# Patient Record
Sex: MALE | Race: WHITE | Employment: OTHER | URBAN - NONMETROPOLITAN AREA
[De-identification: names, ages, dates, MRNs, and addresses within clinical notes are randomized per-mention and may not be internally consistent; named-entity substitution may affect disease eponyms.]

---

## 2017-01-05 ENCOUNTER — MED REC SCAN ONLY (OUTPATIENT)
Dept: FAMILY MEDICINE CLINIC | Facility: CLINIC | Age: 82
End: 2017-01-05

## 2017-01-09 ENCOUNTER — PATIENT OUTREACH (OUTPATIENT)
Dept: FAMILY MEDICINE CLINIC | Facility: CLINIC | Age: 82
End: 2017-01-09

## 2017-01-10 ENCOUNTER — ANESTHESIA EVENT (OUTPATIENT)
Dept: ENDOSCOPY | Facility: HOSPITAL | Age: 82
End: 2017-01-10
Payer: MEDICARE

## 2017-01-10 ENCOUNTER — SURGERY (OUTPATIENT)
Age: 82
End: 2017-01-10

## 2017-01-10 ENCOUNTER — ANESTHESIA (OUTPATIENT)
Dept: ENDOSCOPY | Facility: HOSPITAL | Age: 82
End: 2017-01-10
Payer: MEDICARE

## 2017-01-10 NOTE — ANESTHESIA PREPROCEDURE EVALUATION
PRE-OP EVALUATION    Patient Name: Smooth Locke    Pre-op Diagnosis: ANEMIA    Procedure(s):  ESOPHAGOGASTRODUODENOSCOPY AND COLONOSCOPY      Surgeon(s) and Role:     Adarsh Pulido MD - Primary    Pre-op vitals reviewed.           Current medica Cardiovascular    Negative cardiovascular ROS.     Exercise tolerance: good     MET: >4      (+) hypertension   (+) hyperlipidemia  (+) CAD    (+) CABG/stent                            Endo/Other           (+) hypothyroidism management plan discussed with surgeon and patient. Plan/risks discussed with: patient          Options, risks and benefits of anesthesia as outlined in the anesthesia consent were reviewed with the patient.  The consent was signed without further ques

## 2017-01-10 NOTE — ANESTHESIA POSTPROCEDURE EVALUATION
[id number] [facility] Patient Status:  Hospital Outpatient Surgery   Age/Gender 80year old male MRN [id number]   [facility] Attending Mariana Heredia MD   Hosp Day # 0 PCP Raphael Horton.  Gordon Jose DO       Anesthesia Po

## 2017-02-06 ENCOUNTER — OFFICE VISIT (OUTPATIENT)
Dept: FAMILY MEDICINE CLINIC | Facility: CLINIC | Age: 82
End: 2017-02-06

## 2017-02-06 ENCOUNTER — HOSPITAL ENCOUNTER (OUTPATIENT)
Dept: GENERAL RADIOLOGY | Age: 82
Discharge: HOME | End: 2017-02-06
Attending: FAMILY MEDICINE
Payer: MEDICARE

## 2017-02-06 VITALS
RESPIRATION RATE: 14 BRPM | DIASTOLIC BLOOD PRESSURE: 60 MMHG | HEART RATE: 78 BPM | TEMPERATURE: 98 F | OXYGEN SATURATION: 94 % | BODY MASS INDEX: 27 KG/M2 | WEIGHT: 196 LBS | SYSTOLIC BLOOD PRESSURE: 160 MMHG

## 2017-02-06 DIAGNOSIS — R05.9 COUGH: ICD-10-CM

## 2017-02-06 DIAGNOSIS — R06.02 SHORTNESS OF BREATH: ICD-10-CM

## 2017-02-06 DIAGNOSIS — I50.31 ACUTE DIASTOLIC CONGESTIVE HEART FAILURE (HCC): ICD-10-CM

## 2017-02-06 DIAGNOSIS — J44.1 CHRONIC OBSTRUCTIVE PULMONARY DISEASE WITH ACUTE EXACERBATION (HCC): Primary | ICD-10-CM

## 2017-02-06 PROCEDURE — 99214 OFFICE O/P EST MOD 30 MIN: CPT | Performed by: FAMILY MEDICINE

## 2017-02-06 PROCEDURE — 71020 XR CHEST PA + LAT CHEST (CPT=71020): CPT

## 2017-02-06 RX ORDER — HYDROCHLOROTHIAZIDE 25 MG/1
25 TABLET ORAL DAILY
COMMUNITY
End: 2019-12-18

## 2017-02-06 RX ORDER — LEVOFLOXACIN 500 MG/1
500 TABLET, FILM COATED ORAL DAILY
Qty: 10 TABLET | Refills: 0 | Status: SHIPPED | OUTPATIENT
Start: 2017-02-06 | End: 2017-02-16

## 2017-02-06 RX ORDER — PREDNISONE 20 MG/1
20 TABLET ORAL 2 TIMES DAILY
Qty: 10 TABLET | Refills: 0 | Status: SHIPPED | OUTPATIENT
Start: 2017-02-06 | End: 2017-02-11

## 2017-02-06 NOTE — PROGRESS NOTES
HPI:    Patient ID: Elder Dunne is a 80year old male. Patient presents with:  Cough: CONGESTION, SOB, SINUS DRAIANGE, WHEEZING--X 6 DAYS  here with his wife  HPI c/o cough with tan sputum, runny nose, no fever, SOB intermittently, onset   7-10 days a times daily. Disp:  Rfl:    brimonidine Tartrate (ALPHAGAN) 0.2 % Ophthalmic Solution Place 1 drop into both eyes 3 (three) times daily. Disp:  Rfl:    carvedilol (COREG) 25 MG Oral Tab Take 25 mg by mouth 2 (two) times daily with meals.  Disp:  Rfl: dry.       PROCEDURE:  XR CHEST PA + LAT CHEST (IFH=95316)     INDICATIONS:  R06.02 Shortness of breath R05 Cough     COMPARISON:  [place], XR CHEST PA + LAT CHEST (CPT=71020), 1/12/2016, 14:44.     TECHNIQUE:  PA and lateral chest radiographs were obtain

## 2017-02-06 NOTE — PATIENT INSTRUCTIONS
I have asked the patient to watch his sodium intake and monitor his weight daily  Increase hydrochlorothiazide 25 mg twice daily for the next 5 days  Prednisone 20 mg twice daily ×5 days  Levaquin 500 mg daily ×10 days  May use rescue inhaler up to every 4

## 2017-02-10 ENCOUNTER — TELEPHONE (OUTPATIENT)
Dept: FAMILY MEDICINE CLINIC | Facility: CLINIC | Age: 82
End: 2017-02-10

## 2017-02-10 DIAGNOSIS — T50.2X5A DIURETICS CAUSING ADVERSE EFFECT IN THERAPEUTIC USE, INITIAL ENCOUNTER: ICD-10-CM

## 2017-02-10 DIAGNOSIS — Z79.899 ENCOUNTER FOR LONG-TERM (CURRENT) DRUG USE: Primary | ICD-10-CM

## 2017-02-10 DIAGNOSIS — J44.1 CHRONIC OBSTRUCTIVE PULMONARY DISEASE WITH ACUTE EXACERBATION (HCC): ICD-10-CM

## 2017-02-10 DIAGNOSIS — I50.31 ACUTE DIASTOLIC (CONGESTIVE) HEART FAILURE (HCC): ICD-10-CM

## 2017-02-10 NOTE — TELEPHONE ENCOUNTER
Advised patient it sounds like were on the right direction, I would recommend continued twice daily dosing of hydrochlorothiazide and daily weight monitoring. I would shoot for a weight of around 192 before we go back to once daily dosing.   I would asked

## 2017-02-10 NOTE — TELEPHONE ENCOUNTER
Pt was seen on 2/6 for COPD, cough, SOB and acute diastolic CHF. Pt calls today with an update. States he is feeling ~70% better. Reports his wheezing and SOB have improved significantly, but he does still cough on and off. Gets some phlegm up at times.

## 2017-02-14 ENCOUNTER — APPOINTMENT (OUTPATIENT)
Dept: LAB | Age: 82
End: 2017-02-14
Attending: FAMILY MEDICINE
Payer: MEDICARE

## 2017-02-14 DIAGNOSIS — Z79.899 ENCOUNTER FOR LONG-TERM (CURRENT) DRUG USE: ICD-10-CM

## 2017-02-14 DIAGNOSIS — J44.1 CHRONIC OBSTRUCTIVE PULMONARY DISEASE WITH ACUTE EXACERBATION (HCC): ICD-10-CM

## 2017-02-14 DIAGNOSIS — I50.31 ACUTE DIASTOLIC (CONGESTIVE) HEART FAILURE (HCC): ICD-10-CM

## 2017-02-14 DIAGNOSIS — T50.2X5A DIURETICS CAUSING ADVERSE EFFECT IN THERAPEUTIC USE, INITIAL ENCOUNTER: ICD-10-CM

## 2017-02-14 LAB — POTASSIUM SERPL-SCNC: 4.7 MMOL/L (ref 3.6–5.1)

## 2017-02-14 PROCEDURE — 84132 ASSAY OF SERUM POTASSIUM: CPT

## 2017-02-14 PROCEDURE — 36415 COLL VENOUS BLD VENIPUNCTURE: CPT

## 2017-02-15 ENCOUNTER — TELEPHONE (OUTPATIENT)
Dept: FAMILY MEDICINE CLINIC | Facility: CLINIC | Age: 82
End: 2017-02-15

## 2017-02-15 DIAGNOSIS — J44.1 CHRONIC OBSTRUCTIVE PULMONARY DISEASE WITH ACUTE EXACERBATION (HCC): Primary | ICD-10-CM

## 2017-02-15 DIAGNOSIS — Z79.899 ENCOUNTER FOR LONG-TERM (CURRENT) DRUG USE: ICD-10-CM

## 2017-02-15 NOTE — TELEPHONE ENCOUNTER
----- Message from Nena Buenrostro.  Kym Dukes DO sent at 2/15/2017  7:53 AM CST -----  Advised patient his potassium level is normal, continue same diuretic therapy, recheck lites, BUN, and creatinine in 1 month

## 2017-02-23 ENCOUNTER — APPOINTMENT (OUTPATIENT)
Dept: LAB | Age: 82
End: 2017-02-23
Attending: FAMILY MEDICINE
Payer: MEDICARE

## 2017-02-23 DIAGNOSIS — R93.89 ABNORMAL ULTRASOUND: ICD-10-CM

## 2017-02-23 LAB
ALBUMIN SERPL-MCNC: 3.2 G/DL (ref 3.5–4.8)
ALP LIVER SERPL-CCNC: 112 U/L (ref 45–117)
ALT SERPL-CCNC: 30 U/L (ref 17–63)
AST SERPL-CCNC: 17 U/L (ref 15–41)
BILIRUB SERPL-MCNC: 0.6 MG/DL (ref 0.1–2)
BUN BLD-MCNC: 14 MG/DL (ref 8–20)
CALCIUM BLD-MCNC: 8.4 MG/DL (ref 8.3–10.3)
CHLORIDE: 109 MMOL/L (ref 101–111)
CO2: 28 MMOL/L (ref 22–32)
CREAT BLD-MCNC: 1.03 MG/DL (ref 0.7–1.3)
GLUCOSE BLD-MCNC: 119 MG/DL (ref 70–99)
M PROTEIN MFR SERPL ELPH: 6.2 G/DL (ref 6.1–8.3)
POTASSIUM SERPL-SCNC: 4.8 MMOL/L (ref 3.6–5.1)
SODIUM SERPL-SCNC: 142 MMOL/L (ref 136–144)

## 2017-02-23 PROCEDURE — 80053 COMPREHEN METABOLIC PANEL: CPT

## 2017-02-23 PROCEDURE — 36415 COLL VENOUS BLD VENIPUNCTURE: CPT

## 2017-03-14 ENCOUNTER — MED REC SCAN ONLY (OUTPATIENT)
Dept: FAMILY MEDICINE CLINIC | Facility: CLINIC | Age: 82
End: 2017-03-14

## 2017-04-04 ENCOUNTER — CHARTING TRANS (OUTPATIENT)
Dept: OTHER | Age: 82
End: 2017-04-04

## 2017-05-10 ENCOUNTER — TELEPHONE (OUTPATIENT)
Dept: FAMILY MEDICINE CLINIC | Facility: CLINIC | Age: 82
End: 2017-05-10

## 2017-05-10 ENCOUNTER — OFFICE VISIT (OUTPATIENT)
Dept: FAMILY MEDICINE CLINIC | Facility: CLINIC | Age: 82
End: 2017-05-10

## 2017-05-10 VITALS
TEMPERATURE: 98 F | WEIGHT: 187 LBS | HEIGHT: 71 IN | SYSTOLIC BLOOD PRESSURE: 138 MMHG | BODY MASS INDEX: 26.18 KG/M2 | DIASTOLIC BLOOD PRESSURE: 62 MMHG | HEART RATE: 86 BPM | OXYGEN SATURATION: 99 %

## 2017-05-10 DIAGNOSIS — D64.9 ANEMIA, UNSPECIFIED TYPE: ICD-10-CM

## 2017-05-10 DIAGNOSIS — D50.9 IRON DEFICIENCY ANEMIA, UNSPECIFIED IRON DEFICIENCY ANEMIA TYPE: ICD-10-CM

## 2017-05-10 DIAGNOSIS — I25.10 CORONARY ARTERY DISEASE INVOLVING NATIVE CORONARY ARTERY OF NATIVE HEART WITHOUT ANGINA PECTORIS: ICD-10-CM

## 2017-05-10 DIAGNOSIS — E78.00 HYPERCHOLESTEREMIA: ICD-10-CM

## 2017-05-10 DIAGNOSIS — I10 ESSENTIAL HYPERTENSION: ICD-10-CM

## 2017-05-10 DIAGNOSIS — J44.9 CHRONIC OBSTRUCTIVE PULMONARY DISEASE, UNSPECIFIED COPD TYPE (HCC): ICD-10-CM

## 2017-05-10 DIAGNOSIS — I73.9 PVD (PERIPHERAL VASCULAR DISEASE) (HCC): Primary | ICD-10-CM

## 2017-05-10 DIAGNOSIS — E03.9 HYPOTHYROIDISM (ACQUIRED): ICD-10-CM

## 2017-05-10 DIAGNOSIS — Z00.00 ROUTINE ADULT HEALTH MAINTENANCE: ICD-10-CM

## 2017-05-10 DIAGNOSIS — E03.9 HYPOTHYROIDISM (ACQUIRED): Primary | ICD-10-CM

## 2017-05-10 PROCEDURE — 99214 OFFICE O/P EST MOD 30 MIN: CPT | Performed by: FAMILY MEDICINE

## 2017-05-10 NOTE — PROGRESS NOTES
HPI:    Patient ID: Joel Perla is a 80year old male. Patient presents with:  Medication Follow-Up  HTN: f/u  CAD: f/u  COPD: f/u  Hypothyroidism: f/u  Pre-diabetes: f/u    HPI pt reports labs from Dr Elijah Henao 3 weeks ago , no changes, denies shortness o Disp:  Rfl:    lisinopril (PRINIVIL,ZESTRIL) 20 MG Oral Tab Take 20 mg by mouth 2 (two) times daily. Disp:  Rfl:    finasteride (PROSCAR) 5 MG Oral Tab Take 5 mg by mouth daily.  Disp:  Rfl:    ASPIRIN 81 MG OR TABS 1 TABLET DAILY-pt to check with Dr. Reyes Desouza evaluation. Discussed management options for peripheral vascular disease including continued antiplatelet therapy and daily walking regimen  Reviewed the use of inhaled medications.   Medication list reviewed, no changes pending review of labs        No or

## 2017-05-10 NOTE — TELEPHONE ENCOUNTER
Please advise patient that I just received the labs from Dr. Demetrio Oneil. The only thing that was checked was his cholesterol  I like patient to follow-up for BMP, CBC, ferritin, TSH and free T4.

## 2017-05-10 NOTE — PATIENT INSTRUCTIONS
I reviewed goals for blood pressure, lipids, blood sugar. We will obtain records of most recent labs and cardiology evaluation.   Discussed management options for peripheral vascular disease including continued antiplatelet therapy and daily walking regime

## 2017-05-11 ENCOUNTER — MED REC SCAN ONLY (OUTPATIENT)
Dept: FAMILY MEDICINE CLINIC | Facility: CLINIC | Age: 82
End: 2017-05-11

## 2017-05-11 PROBLEM — Z00.00 ROUTINE ADULT HEALTH MAINTENANCE: Status: ACTIVE | Noted: 2017-05-11

## 2017-05-11 NOTE — TELEPHONE ENCOUNTER
Patient notified and verbalized understanding of the information provided  No additional questions at this time  Labs ordered  Future Appointments  Date Time Provider Wade Lawrence   5/12/2017 2:00 PM REF EMG SW FAM PRAC REF [facility] Ref Lab [facility]

## 2017-05-12 ENCOUNTER — LAB ENCOUNTER (OUTPATIENT)
Dept: LAB | Age: 82
End: 2017-05-12
Attending: FAMILY MEDICINE
Payer: MEDICARE

## 2017-05-12 DIAGNOSIS — D64.9 ANEMIA, UNSPECIFIED TYPE: ICD-10-CM

## 2017-05-12 DIAGNOSIS — E03.9 HYPOTHYROIDISM (ACQUIRED): ICD-10-CM

## 2017-05-12 DIAGNOSIS — I25.10 CORONARY ARTERY DISEASE INVOLVING NATIVE CORONARY ARTERY OF NATIVE HEART WITHOUT ANGINA PECTORIS: ICD-10-CM

## 2017-05-12 PROCEDURE — 82728 ASSAY OF FERRITIN: CPT

## 2017-05-12 PROCEDURE — 85025 COMPLETE CBC W/AUTO DIFF WBC: CPT

## 2017-05-12 PROCEDURE — 84439 ASSAY OF FREE THYROXINE: CPT

## 2017-05-12 PROCEDURE — 36415 COLL VENOUS BLD VENIPUNCTURE: CPT

## 2017-05-12 PROCEDURE — 84443 ASSAY THYROID STIM HORMONE: CPT

## 2017-05-12 PROCEDURE — 80048 BASIC METABOLIC PNL TOTAL CA: CPT

## 2017-05-15 ENCOUNTER — MED REC SCAN ONLY (OUTPATIENT)
Dept: FAMILY MEDICINE CLINIC | Facility: CLINIC | Age: 82
End: 2017-05-15

## 2017-06-02 ENCOUNTER — MED REC SCAN ONLY (OUTPATIENT)
Dept: FAMILY MEDICINE CLINIC | Facility: CLINIC | Age: 82
End: 2017-06-02

## 2017-08-15 ENCOUNTER — TELEPHONE (OUTPATIENT)
Dept: FAMILY MEDICINE CLINIC | Facility: CLINIC | Age: 82
End: 2017-08-15

## 2017-08-15 NOTE — TELEPHONE ENCOUNTER
Sheryl Sam, RN  [facility] Nurse             Patient is due for repeat CBC.  He would like a call to schedule

## 2017-08-17 NOTE — TELEPHONE ENCOUNTER
APPT SCHEDULED    Future Appointments  Date Time Provider Wade Melinda   8/18/2017 9:30 AM REF EM[facility] SW FAM PRAC REF [facility] Ref Lab [place]   8/23/2017 1:00 PM [facility]   8/23/2017 2:30 PM Rodney Funk MD [facility] 9644

## 2017-08-18 ENCOUNTER — LABORATORY ENCOUNTER (OUTPATIENT)
Dept: LAB | Age: 82
End: 2017-08-18
Attending: FAMILY MEDICINE
Payer: MEDICARE

## 2017-08-18 DIAGNOSIS — D50.8 OTHER IRON DEFICIENCY ANEMIA: ICD-10-CM

## 2017-08-18 LAB
BASOPHILS # BLD AUTO: 0.02 X10(3) UL (ref 0–0.1)
BASOPHILS NFR BLD AUTO: 0.2 %
EOSINOPHIL # BLD AUTO: 0 X10(3) UL (ref 0–0.3)
EOSINOPHIL NFR BLD AUTO: 0 %
ERYTHROCYTE [DISTWIDTH] IN BLOOD BY AUTOMATED COUNT: 14.3 % (ref 11.5–16)
HCT VFR BLD AUTO: 61.4 % (ref 37–53)
HGB BLD-MCNC: 20.3 G/DL (ref 13–17)
IMMATURE GRANULOCYTE COUNT: 0.05 X10(3) UL (ref 0–1)
IMMATURE GRANULOCYTE RATIO %: 0.6 %
LYMPHOCYTES # BLD AUTO: 0.82 X10(3) UL (ref 0.9–4)
LYMPHOCYTES NFR BLD AUTO: 10 %
MCH RBC QN AUTO: 32.4 PG (ref 27–33.2)
MCHC RBC AUTO-ENTMCNC: 33.1 G/DL (ref 31–37)
MCV RBC AUTO: 98.1 FL (ref 80–99)
MONOCYTES # BLD AUTO: 0.4 X10(3) UL (ref 0.1–0.6)
MONOCYTES NFR BLD AUTO: 4.9 %
NEUTROPHIL ABS PRELIM: 6.93 X10 (3) UL (ref 1.3–6.7)
NEUTROPHILS # BLD AUTO: 6.93 X10(3) UL (ref 1.3–6.7)
NEUTROPHILS NFR BLD AUTO: 84.3 %
PLATELET # BLD AUTO: 77 10(3)UL (ref 150–450)
RBC # BLD AUTO: 6.26 X10(6)UL (ref 3.8–5.8)
RED CELL DISTRIBUTION WIDTH-SD: 49.9 FL (ref 35.1–46.3)
WBC # BLD AUTO: 8.2 X10(3) UL (ref 4–13)

## 2017-08-18 PROCEDURE — 85025 COMPLETE CBC W/AUTO DIFF WBC: CPT

## 2017-08-18 PROCEDURE — 36415 COLL VENOUS BLD VENIPUNCTURE: CPT

## 2017-08-19 ENCOUNTER — TELEPHONE (OUTPATIENT)
Dept: FAMILY MEDICINE CLINIC | Facility: CLINIC | Age: 82
End: 2017-08-19

## 2017-08-19 NOTE — TELEPHONE ENCOUNTER
Patient calling back states he forgot to mention he is taking a medrol dose annette for lower back pain, and sciatic nerve pain. Wondering if this could have messed with blood tests.    Patient would like for Dr. Carson Ca to know this, to see if this changes

## 2017-08-25 ENCOUNTER — TELEPHONE (OUTPATIENT)
Dept: FAMILY MEDICINE CLINIC | Facility: CLINIC | Age: 82
End: 2017-08-25

## 2017-08-25 NOTE — TELEPHONE ENCOUNTER
Please advise patient that his hemoglobin is back into a more normal range. I think the previous result was a lab error.

## 2017-09-28 ENCOUNTER — CHARTING TRANS (OUTPATIENT)
Dept: OTHER | Age: 82
End: 2017-09-28

## 2017-11-13 ENCOUNTER — CHARTING TRANS (OUTPATIENT)
Dept: OTHER | Age: 82
End: 2017-11-13

## 2017-11-16 ENCOUNTER — CHARTING TRANS (OUTPATIENT)
Dept: OTHER | Age: 82
End: 2017-11-16

## 2017-11-27 ENCOUNTER — CHARTING TRANS (OUTPATIENT)
Dept: OTHER | Age: 82
End: 2017-11-27

## 2017-11-30 ENCOUNTER — CHARTING TRANS (OUTPATIENT)
Dept: OTHER | Age: 82
End: 2017-11-30

## 2017-12-12 ENCOUNTER — CHARTING TRANS (OUTPATIENT)
Dept: OTHER | Age: 82
End: 2017-12-12

## 2018-01-16 ENCOUNTER — OFFICE VISIT (OUTPATIENT)
Dept: FAMILY MEDICINE CLINIC | Facility: CLINIC | Age: 83
End: 2018-01-16

## 2018-01-16 ENCOUNTER — LABORATORY ENCOUNTER (OUTPATIENT)
Dept: LAB | Age: 83
End: 2018-01-16
Attending: FAMILY MEDICINE
Payer: MEDICARE

## 2018-01-16 VITALS
HEIGHT: 71.5 IN | HEART RATE: 56 BPM | WEIGHT: 195.5 LBS | DIASTOLIC BLOOD PRESSURE: 70 MMHG | OXYGEN SATURATION: 99 % | TEMPERATURE: 98 F | SYSTOLIC BLOOD PRESSURE: 126 MMHG | BODY MASS INDEX: 26.77 KG/M2

## 2018-01-16 DIAGNOSIS — D64.9 ANEMIA, UNSPECIFIED TYPE: ICD-10-CM

## 2018-01-16 DIAGNOSIS — E78.00 HYPERCHOLESTEREMIA: ICD-10-CM

## 2018-01-16 DIAGNOSIS — Z13.31 DEPRESSION SCREENING: ICD-10-CM

## 2018-01-16 DIAGNOSIS — I10 ESSENTIAL HYPERTENSION: ICD-10-CM

## 2018-01-16 DIAGNOSIS — I25.10 CORONARY ARTERY DISEASE INVOLVING NATIVE CORONARY ARTERY OF NATIVE HEART WITHOUT ANGINA PECTORIS: ICD-10-CM

## 2018-01-16 DIAGNOSIS — Z00.00 ENCOUNTER FOR ANNUAL HEALTH EXAMINATION: Primary | ICD-10-CM

## 2018-01-16 DIAGNOSIS — R73.03 PRE-DIABETES: ICD-10-CM

## 2018-01-16 DIAGNOSIS — J44.9 CHRONIC OBSTRUCTIVE PULMONARY DISEASE, UNSPECIFIED COPD TYPE (HCC): ICD-10-CM

## 2018-01-16 DIAGNOSIS — I73.9 PVD (PERIPHERAL VASCULAR DISEASE) (HCC): ICD-10-CM

## 2018-01-16 DIAGNOSIS — E03.9 HYPOTHYROIDISM (ACQUIRED): ICD-10-CM

## 2018-01-16 DIAGNOSIS — N40.0 BENIGN NON-NODULAR PROSTATIC HYPERPLASIA WITHOUT LOWER URINARY TRACT SYMPTOMS: ICD-10-CM

## 2018-01-16 DIAGNOSIS — D50.8 OTHER IRON DEFICIENCY ANEMIA: ICD-10-CM

## 2018-01-16 DIAGNOSIS — H40.9 GLAUCOMA OF BOTH EYES, UNSPECIFIED GLAUCOMA TYPE: ICD-10-CM

## 2018-01-16 LAB
ALBUMIN SERPL-MCNC: 3.7 G/DL (ref 3.5–4.8)
ALP LIVER SERPL-CCNC: 58 U/L (ref 45–117)
ALT SERPL-CCNC: 32 U/L (ref 17–63)
AST SERPL-CCNC: 23 U/L (ref 15–41)
BASOPHILS # BLD AUTO: 0.03 X10(3) UL (ref 0–0.1)
BASOPHILS NFR BLD AUTO: 0.5 %
BILIRUB SERPL-MCNC: 0.8 MG/DL (ref 0.1–2)
BUN BLD-MCNC: 26 MG/DL (ref 8–20)
CALCIUM BLD-MCNC: 8.5 MG/DL (ref 8.3–10.3)
CHLORIDE: 109 MMOL/L (ref 101–111)
CHOLEST SMN-MCNC: 141 MG/DL (ref ?–200)
CO2: 27 MMOL/L (ref 22–32)
CREAT BLD-MCNC: 0.99 MG/DL (ref 0.7–1.3)
DEPRECATED HBV CORE AB SER IA-ACNC: 361.9 NG/ML (ref 22–322)
EOSINOPHIL # BLD AUTO: 0.14 X10(3) UL (ref 0–0.3)
EOSINOPHIL NFR BLD AUTO: 2.4 %
ERYTHROCYTE [DISTWIDTH] IN BLOOD BY AUTOMATED COUNT: 12.2 % (ref 11.5–16)
EST. AVERAGE GLUCOSE BLD GHB EST-MCNC: 108 MG/DL (ref 68–126)
FREE T4: 1.5 NG/DL (ref 0.9–1.8)
GLUCOSE BLD-MCNC: 91 MG/DL (ref 70–99)
HBA1C MFR BLD HPLC: 5.4 % (ref ?–5.7)
HCT VFR BLD AUTO: 35.1 % (ref 37–53)
HDLC SERPL-MCNC: 55 MG/DL (ref 45–?)
HDLC SERPL: 2.56 {RATIO} (ref ?–4.97)
HEMOGLOBIN: 12.7 G/DL (ref 10.3–19.8)
HGB BLD-MCNC: 11.9 G/DL (ref 13–17)
IMMATURE GRANULOCYTE COUNT: 0.02 X10(3) UL (ref 0–1)
IMMATURE GRANULOCYTE RATIO %: 0.3 %
LDLC SERPL CALC-MCNC: 70 MG/DL (ref ?–130)
LYMPHOCYTES # BLD AUTO: 1.07 X10(3) UL (ref 0.9–4)
LYMPHOCYTES NFR BLD AUTO: 18.2 %
M PROTEIN MFR SERPL ELPH: 6.4 G/DL (ref 6.1–8.3)
MCH RBC QN AUTO: 32.8 PG (ref 27–33.2)
MCHC RBC AUTO-ENTMCNC: 33.9 G/DL (ref 31–37)
MCV RBC AUTO: 96.7 FL (ref 80–99)
MONOCYTES # BLD AUTO: 0.51 X10(3) UL (ref 0.1–0.6)
MONOCYTES NFR BLD AUTO: 8.7 %
NEUTROPHIL ABS PRELIM: 4.11 X10 (3) UL (ref 1.3–6.7)
NEUTROPHILS # BLD AUTO: 4.11 X10(3) UL (ref 1.3–6.7)
NEUTROPHILS NFR BLD AUTO: 69.9 %
NONHDLC SERPL-MCNC: 86 MG/DL (ref ?–130)
PLATELET # BLD AUTO: 168 10(3)UL (ref 150–450)
PLT IC: 197 10^3/UL (ref 150–450)
POTASSIUM SERPL-SCNC: 4.4 MMOL/L (ref 3.6–5.1)
RBC # BLD AUTO: 3.63 X10(6)UL (ref 3.8–5.8)
RED CELL DISTRIBUTION WIDTH-SD: 43.3 FL (ref 35.1–46.3)
SODIUM SERPL-SCNC: 142 MMOL/L (ref 136–144)
TRIGL SERPL-MCNC: 78 MG/DL (ref ?–150)
TSI SER-ACNC: 0.43 MIU/ML (ref 0.35–5.5)
VLDLC SERPL CALC-MCNC: 16 MG/DL (ref 5–40)
WBC # BLD AUTO: 5.9 X10(3) UL (ref 4–13)

## 2018-01-16 PROCEDURE — 84443 ASSAY THYROID STIM HORMONE: CPT

## 2018-01-16 PROCEDURE — 84439 ASSAY OF FREE THYROXINE: CPT

## 2018-01-16 PROCEDURE — 83036 HEMOGLOBIN GLYCOSYLATED A1C: CPT

## 2018-01-16 PROCEDURE — 36415 COLL VENOUS BLD VENIPUNCTURE: CPT

## 2018-01-16 PROCEDURE — G0444 DEPRESSION SCREEN ANNUAL: HCPCS | Performed by: FAMILY MEDICINE

## 2018-01-16 PROCEDURE — G0439 PPPS, SUBSEQ VISIT: HCPCS | Performed by: FAMILY MEDICINE

## 2018-01-16 PROCEDURE — 80061 LIPID PANEL: CPT

## 2018-01-16 PROCEDURE — 36415 COLL VENOUS BLD VENIPUNCTURE: CPT | Performed by: FAMILY MEDICINE

## 2018-01-16 PROCEDURE — 85025 COMPLETE CBC W/AUTO DIFF WBC: CPT

## 2018-01-16 PROCEDURE — 80053 COMPREHEN METABOLIC PANEL: CPT

## 2018-01-16 PROCEDURE — 82728 ASSAY OF FERRITIN: CPT

## 2018-01-16 RX ORDER — VITAMIN E 268 MG
1 CAPSULE ORAL DAILY
COMMUNITY

## 2018-01-16 NOTE — PATIENT INSTRUCTIONS
Harrison Gonzalez's SCREENING SCHEDULE   Tests on this list are recommended by your physician but may not be covered, or covered at this frequency, by your insurer. Please check with your insurance carrier before scheduling to verify coverage.     PREVENT in their lifetime   • Anyone with a family history    Colorectal Cancer Screening Covered up to Age 76     Colonoscopy Screen   Covered every 10 years- more often if abnormal There are no preventive care reminders to display for this patient.  Update Health Tetanus Toxoid- Only covered with a cut with metal- TD and TDaP Not covered by Medicare Part B) No orders found for this or any previous visit.  This may be covered with your prescription benefits, but Medicare does not cover unless Medically needed    Zost weight reduction. - VENIPUNCTURE  - COMP METABOLIC PANEL (14); Future  - HEMOGLOBIN A1C; Future    6. Hypothyroidism (acquired)  Continue daily thyroid replacement pending results of today's labs  - VENIPUNCTURE  - TSH+FREE T4; Future    7.  Anemia, unspec

## 2018-01-16 NOTE — H&P
Nancy Lama is a 80year old male Patient presents with:  Physical: Medicare AWV    HPI:   Pt voices no concerns   Wt Readings from Last 6 Encounters:  01/16/18 : 195 lb 8 oz  08/23/17 : 191 lb  05/10/17 : 187 lb  02/06/17 : 196 lb  01/10/17 : 193 lb Place 1 drop into both eyes 3 (three) times daily. Disp:  Rfl:    carvedilol (COREG) 25 MG Oral Tab Take 25 mg by mouth 2 (two) times daily with meals.  Disp:  Rfl:    Dorzolamide HCl (TRUSOPT) 2 % Ophthalmic Solution Place 1 drop into both eyes 3 (three) Comment: right shoulder surgery   Family History   Problem Relation Age of Onset   • Heart Disorder Brother       Specialists:Dr Watters- GI, Dr Zuñiga-cardiology, Dr Pete Cooks surgery, Dr Enos Meigs- urology, Dr Patricio Jenkins- dermatology ( [facility])  Social History sleep difficulty,                   Have you often been bothered by feeling down, depressed of hopeless?  no                  Have you often been bothered by little interest or pleasure in doing things? no  HEMATOLOGIC: denies unexplained bruising or bleedi Encounter for annual health examination  (primary encounter diagnosis)  Depression screening  Coronary artery disease involving native coronary artery of native heart without angina pectoris  Hypercholesteremia  Pre-diabetes  Hypothyroidism (acquired)  A every 3 years,           Glucose (mg/dL)   Date Value   05/12/2017 136 (H)   07/03/2014 108 (H)   ---------- Medicare covers annually or at 6-month intervals for prediabetic patients        Cardiovascular Disease Screening     Cholesterol, covered every 5 annually to 75 There are no preventive care reminders to display for this patient. No results found for this or any previous visit.    Annually (age 48 or over), QASIM not paid separately when covered E/M service is provided on same date    Immunizations printer. (the forms are also available in [facility])  [email or web address]  This link also has information from the [facility] regarding Advance Directives.   1. Encounter for annual health examination  I reviewed age-appropriate preventi visit  - VENIPUNCTURE    10. Chronic obstructive pulmonary disease, unspecified COPD type ([facility]  Continue inhaled bronchodilators as needed  - VENIPUNCTURE    11.  Benign non-nodular prostatic hyperplasia without lower urinary tract symptoms  Follow-up with

## 2018-01-17 DIAGNOSIS — R73.03 PRE-DIABETES: ICD-10-CM

## 2018-01-17 DIAGNOSIS — E78.00 ELEVATED CHOLESTEROL: ICD-10-CM

## 2018-01-17 DIAGNOSIS — D64.9 ANEMIA, UNSPECIFIED TYPE: ICD-10-CM

## 2018-01-17 DIAGNOSIS — Z79.899 ENCOUNTER FOR LONG-TERM (CURRENT) DRUG USE: ICD-10-CM

## 2018-01-17 DIAGNOSIS — E03.9 HYPOTHYROIDISM (ACQUIRED): Primary | ICD-10-CM

## 2018-01-22 ENCOUNTER — APPOINTMENT (OUTPATIENT)
Dept: LAB | Facility: HOSPITAL | Age: 83
End: 2018-01-22
Attending: FAMILY MEDICINE
Payer: MEDICARE

## 2018-01-22 DIAGNOSIS — D64.9 ANEMIA, UNSPECIFIED TYPE: ICD-10-CM

## 2018-01-22 PROCEDURE — 82272 OCCULT BLD FECES 1-3 TESTS: CPT

## 2018-05-30 ENCOUNTER — CHARTING TRANS (OUTPATIENT)
Dept: OTHER | Age: 83
End: 2018-05-30

## 2018-06-13 ENCOUNTER — CHARTING TRANS (OUTPATIENT)
Dept: OTHER | Age: 83
End: 2018-06-13

## 2018-07-11 ENCOUNTER — MED REC SCAN ONLY (OUTPATIENT)
Dept: FAMILY MEDICINE CLINIC | Facility: CLINIC | Age: 83
End: 2018-07-11

## 2018-07-23 ENCOUNTER — OFFICE VISIT (OUTPATIENT)
Dept: FAMILY MEDICINE CLINIC | Facility: CLINIC | Age: 83
End: 2018-07-23
Payer: MEDICARE

## 2018-07-23 ENCOUNTER — LABORATORY ENCOUNTER (OUTPATIENT)
Dept: LAB | Age: 83
End: 2018-07-23
Attending: FAMILY MEDICINE
Payer: MEDICARE

## 2018-07-23 VITALS
SYSTOLIC BLOOD PRESSURE: 138 MMHG | WEIGHT: 196 LBS | TEMPERATURE: 98 F | OXYGEN SATURATION: 99 % | HEART RATE: 52 BPM | DIASTOLIC BLOOD PRESSURE: 70 MMHG | BODY MASS INDEX: 27 KG/M2

## 2018-07-23 DIAGNOSIS — E03.9 HYPOTHYROIDISM (ACQUIRED): ICD-10-CM

## 2018-07-23 DIAGNOSIS — E78.00 HYPERCHOLESTEREMIA: ICD-10-CM

## 2018-07-23 DIAGNOSIS — I10 ESSENTIAL HYPERTENSION: ICD-10-CM

## 2018-07-23 DIAGNOSIS — D50.9 IRON DEFICIENCY ANEMIA, UNSPECIFIED IRON DEFICIENCY ANEMIA TYPE: ICD-10-CM

## 2018-07-23 DIAGNOSIS — J44.9 CHRONIC OBSTRUCTIVE PULMONARY DISEASE, UNSPECIFIED COPD TYPE (HCC): ICD-10-CM

## 2018-07-23 DIAGNOSIS — E78.00 ELEVATED CHOLESTEROL: ICD-10-CM

## 2018-07-23 DIAGNOSIS — I73.9 PVD (PERIPHERAL VASCULAR DISEASE) (HCC): ICD-10-CM

## 2018-07-23 DIAGNOSIS — R10.11 RIGHT UPPER QUADRANT ABDOMINAL PAIN: Primary | ICD-10-CM

## 2018-07-23 DIAGNOSIS — I25.10 CORONARY ARTERY DISEASE INVOLVING NATIVE CORONARY ARTERY OF NATIVE HEART WITHOUT ANGINA PECTORIS: ICD-10-CM

## 2018-07-23 LAB
ALBUMIN SERPL-MCNC: 3.8 G/DL (ref 3.5–4.8)
ALBUMIN/GLOB SERPL: 1.3 {RATIO} (ref 1–2)
ALP LIVER SERPL-CCNC: 70 U/L (ref 45–117)
ALT SERPL-CCNC: 29 U/L (ref 17–63)
ANION GAP SERPL CALC-SCNC: 4 MMOL/L (ref 0–18)
AST SERPL-CCNC: 21 U/L (ref 15–41)
BASOPHILS # BLD AUTO: 0.03 X10(3) UL (ref 0–0.1)
BASOPHILS NFR BLD AUTO: 0.5 %
BILIRUB SERPL-MCNC: 0.6 MG/DL (ref 0.1–2)
BUN BLD-MCNC: 18 MG/DL (ref 8–20)
BUN/CREAT SERPL: 17.5 (ref 10–20)
CALCIUM BLD-MCNC: 8.4 MG/DL (ref 8.3–10.3)
CHLORIDE SERPL-SCNC: 113 MMOL/L (ref 101–111)
CHOLEST SMN-MCNC: 131 MG/DL (ref ?–200)
CO2 SERPL-SCNC: 26 MMOL/L (ref 22–32)
CREAT BLD-MCNC: 1.03 MG/DL (ref 0.7–1.3)
EOSINOPHIL # BLD AUTO: 0.41 X10(3) UL (ref 0–0.3)
EOSINOPHIL NFR BLD AUTO: 6.5 %
ERYTHROCYTE [DISTWIDTH] IN BLOOD BY AUTOMATED COUNT: 13 % (ref 11.5–16)
GLOBULIN PLAS-MCNC: 2.9 G/DL (ref 2.5–3.7)
GLUCOSE BLD-MCNC: 110 MG/DL (ref 70–99)
HCT VFR BLD AUTO: 35.5 % (ref 37–53)
HDLC SERPL-MCNC: 45 MG/DL (ref 40–59)
HGB BLD-MCNC: 12 G/DL (ref 13–17)
IMMATURE GRANULOCYTE COUNT: 0.03 X10(3) UL (ref 0–1)
IMMATURE GRANULOCYTE RATIO %: 0.5 %
LDLC SERPL CALC-MCNC: 65 MG/DL (ref ?–100)
LYMPHOCYTES # BLD AUTO: 1.13 X10(3) UL (ref 0.9–4)
LYMPHOCYTES NFR BLD AUTO: 17.8 %
M PROTEIN MFR SERPL ELPH: 6.7 G/DL (ref 6.1–8.3)
MCH RBC QN AUTO: 32.6 PG (ref 27–33.2)
MCHC RBC AUTO-ENTMCNC: 33.8 G/DL (ref 31–37)
MCV RBC AUTO: 96.5 FL (ref 80–99)
MONOCYTES # BLD AUTO: 0.59 X10(3) UL (ref 0.1–1)
MONOCYTES NFR BLD AUTO: 9.3 %
NEUTROPHIL ABS PRELIM: 4.15 X10 (3) UL (ref 1.3–6.7)
NEUTROPHILS # BLD AUTO: 4.15 X10(3) UL (ref 1.3–6.7)
NEUTROPHILS NFR BLD AUTO: 65.4 %
NONHDLC SERPL-MCNC: 86 MG/DL (ref ?–130)
OSMOLALITY SERPL CALC.SUM OF ELEC: 299 MOSM/KG (ref 275–295)
PLATELET # BLD AUTO: 170 10(3)UL (ref 150–450)
POTASSIUM SERPL-SCNC: 5.2 MMOL/L (ref 3.6–5.1)
RBC # BLD AUTO: 3.68 X10(6)UL (ref 3.8–5.8)
RED CELL DISTRIBUTION WIDTH-SD: 45.1 FL (ref 35.1–46.3)
SODIUM SERPL-SCNC: 143 MMOL/L (ref 136–144)
T4 FREE SERPL-MCNC: 1.3 NG/DL (ref 0.9–1.8)
TRIGL SERPL-MCNC: 106 MG/DL (ref 30–149)
TSI SER-ACNC: 0.31 MIU/ML (ref 0.35–5.5)
VLDLC SERPL CALC-MCNC: 21 MG/DL (ref 0–30)
WBC # BLD AUTO: 6.3 X10(3) UL (ref 4–13)

## 2018-07-23 PROCEDURE — 84439 ASSAY OF FREE THYROXINE: CPT

## 2018-07-23 PROCEDURE — 36415 COLL VENOUS BLD VENIPUNCTURE: CPT

## 2018-07-23 PROCEDURE — 36415 COLL VENOUS BLD VENIPUNCTURE: CPT | Performed by: FAMILY MEDICINE

## 2018-07-23 PROCEDURE — 99214 OFFICE O/P EST MOD 30 MIN: CPT | Performed by: FAMILY MEDICINE

## 2018-07-23 PROCEDURE — 80053 COMPREHEN METABOLIC PANEL: CPT

## 2018-07-23 PROCEDURE — 80061 LIPID PANEL: CPT

## 2018-07-23 PROCEDURE — 84443 ASSAY THYROID STIM HORMONE: CPT

## 2018-07-23 PROCEDURE — 85025 COMPLETE CBC W/AUTO DIFF WBC: CPT

## 2018-07-23 RX ORDER — OMEPRAZOLE 20 MG/1
20 CAPSULE, DELAYED RELEASE ORAL EVERY MORNING
Qty: 30 CAPSULE | Refills: 0 | Status: SHIPPED | OUTPATIENT
Start: 2018-07-23 | End: 2018-10-19

## 2018-07-23 NOTE — PROGRESS NOTES
HPI:    Patient ID: Lluvia Haas is a 80year old male.   Patient presents with:  HTN: f/u  Lipids: f/u  Hypothyroidism: f/u  CAD: f/u  COPD: f/u  pt saw Dr Dennis Gage 7/9/18, Eliquis stopped , s/p ablation for a-flutter  HPI c/o RUQ pain off and on x 1 m 125 mcg by mouth before breakfast. Disp:  Rfl:    Cranberry 125 MG Oral Tab Take 1 tablet by mouth daily. Disp:  Rfl:    Ipratropium-Albuterol  MCG/ACT Inhalation Aero Soln Inhale into the lungs 4 (four) times daily.    Disp:  Rfl:    brimonidine Tart person, place, and time. Skin: Skin is warm and dry. Psychiatric: His behavior is normal. Judgment and thought content normal. His affect is blunt. His speech is delayed.     Blood pressure 138/70, pulse 52, temperature 98.2 °F (36.8 °C), temperature so

## 2018-07-23 NOTE — PATIENT INSTRUCTIONS
I reviewed possible sources for patient's abdominal pain. Based on response to antacids and location, would recommend treatment for gastritis. Patient advised to avoid NSAIDs such as ibuprofen or Aleve, may use Tylenol.   Would avoid taking medication wit

## 2018-08-10 ENCOUNTER — APPOINTMENT (OUTPATIENT)
Dept: LAB | Age: 83
End: 2018-08-10
Attending: FAMILY MEDICINE
Payer: MEDICARE

## 2018-08-10 DIAGNOSIS — E87.5 HYPERKALEMIA: ICD-10-CM

## 2018-08-10 LAB — POTASSIUM SERPL-SCNC: 4.5 MMOL/L (ref 3.6–5.1)

## 2018-08-10 PROCEDURE — 36415 COLL VENOUS BLD VENIPUNCTURE: CPT

## 2018-08-10 PROCEDURE — 84132 ASSAY OF SERUM POTASSIUM: CPT

## 2018-08-11 DIAGNOSIS — E87.5 SERUM POTASSIUM ELEVATED: Primary | ICD-10-CM

## 2018-10-19 RX ORDER — LORATADINE 10 MG/1
10 TABLET ORAL DAILY
COMMUNITY

## 2018-10-19 RX ORDER — MELATONIN
325
COMMUNITY
End: 2019-01-21

## 2018-10-23 ENCOUNTER — HOSPITAL ENCOUNTER (OUTPATIENT)
Dept: INTERVENTIONAL RADIOLOGY/VASCULAR | Facility: HOSPITAL | Age: 83
Discharge: HOME | End: 2018-10-23
Attending: SURGERY | Admitting: SURGERY
Payer: MEDICARE

## 2018-10-23 VITALS
RESPIRATION RATE: 21 BRPM | DIASTOLIC BLOOD PRESSURE: 59 MMHG | SYSTOLIC BLOOD PRESSURE: 144 MMHG | HEIGHT: 71 IN | OXYGEN SATURATION: 100 % | HEART RATE: 64 BPM | WEIGHT: 190 LBS | BODY MASS INDEX: 26.6 KG/M2 | TEMPERATURE: 97 F

## 2018-10-23 DIAGNOSIS — I73.9 PVD (PERIPHERAL VASCULAR DISEASE) (HCC): ICD-10-CM

## 2018-10-23 PROCEDURE — 36246 INS CATH ABD/L-EXT ART 2ND: CPT

## 2018-10-23 PROCEDURE — 99152 MOD SED SAME PHYS/QHP 5/>YRS: CPT

## 2018-10-23 PROCEDURE — B410YZZ FLUOROSCOPY OF ABDOMINAL AORTA USING OTHER CONTRAST: ICD-10-PCS | Performed by: SURGERY

## 2018-10-23 PROCEDURE — 76937 US GUIDE VASCULAR ACCESS: CPT

## 2018-10-23 PROCEDURE — 75630 X-RAY AORTA LEG ARTERIES: CPT

## 2018-10-23 PROCEDURE — B41GYZZ FLUOROSCOPY OF LEFT LOWER EXTREMITY ARTERIES USING OTHER CONTRAST: ICD-10-PCS | Performed by: SURGERY

## 2018-10-23 RX ORDER — MIDAZOLAM HYDROCHLORIDE 1 MG/ML
INJECTION INTRAMUSCULAR; INTRAVENOUS
Status: COMPLETED
Start: 2018-10-23 | End: 2018-10-23

## 2018-10-23 RX ORDER — HYDRALAZINE HYDROCHLORIDE 20 MG/ML
INJECTION INTRAMUSCULAR; INTRAVENOUS
Status: COMPLETED
Start: 2018-10-23 | End: 2018-10-23

## 2018-10-23 RX ORDER — HEPARIN SODIUM 5000 [USP'U]/ML
INJECTION, SOLUTION INTRAVENOUS; SUBCUTANEOUS
Status: COMPLETED
Start: 2018-10-23 | End: 2018-10-23

## 2018-10-23 RX ORDER — SODIUM CHLORIDE 9 MG/ML
INJECTION, SOLUTION INTRAVENOUS CONTINUOUS
Status: DISCONTINUED | OUTPATIENT
Start: 2018-10-23 | End: 2018-10-23

## 2018-10-23 RX ORDER — LIDOCAINE HYDROCHLORIDE 10 MG/ML
INJECTION, SOLUTION EPIDURAL; INFILTRATION; INTRACAUDAL; PERINEURAL
Status: COMPLETED
Start: 2018-10-23 | End: 2018-10-23

## 2018-10-23 RX ADMIN — HYDRALAZINE HYDROCHLORIDE 10 MG: 20 INJECTION INTRAMUSCULAR; INTRAVENOUS at 12:20:00

## 2018-10-23 RX ADMIN — SODIUM CHLORIDE: 9 INJECTION, SOLUTION INTRAVENOUS at 09:15:00

## 2018-10-23 NOTE — PROGRESS NOTES
Discharge instruction explained to the patient and family,questions were answered, vitals are stable, iv removed, walked in the hallway right femoral access area is clean and dry, patient was taken in a  Wheelchair to the car and went home in stable condit

## 2018-10-23 NOTE — H&P
[facility]  Vascular Surgery Consultation    Jillian Perez Patient Status:  Outpatient in a Bed    1935 MRN [id number]   Location [address] [facility] Attending Lucinda Hayward MD   Hosp Day # 0 PCP Eunice Smith.  Kt Pang atrial flutter   • SHOULDER ARTHROSCOPY ROTATOR CUFF REPAIR Left 4/26/2016    Performed by Danay Mcclain MD at [facility]   • SHOULDER ARTHROSCOPY ROTATOR CUFF REPAIR Right 11/10/2015    Performed by Danay Mcclain MD at [facility] MAIN OR     Family History   Prob normal mood and affect, no sensory or motor deficit    ARTERIAL VASCULAR EXAM    LOWER EXTREMITY     FEMORAL POPLITEAL POST TIB ANT TIB PERONEAL   RIGHT 3 doppler doppler doppler         LEFT 3 doppler doppler doppler             Laboratory Data:       Imp

## 2018-10-23 NOTE — BRIEF OP NOTE
Pre-Operative Diagnosis: Atherosclerosis with claudication left leg     Post-Operative Diagnosis: same     Procedure Performed:   1. US perc access right common femoral artery  2. Aortogram with bilateral runoff  3.  Selection of left common femoral artery

## 2018-11-01 NOTE — PROCEDURES
[facility]    PATIENT'S NAME: Jarek Jimenez   ATTENDING PHYSICIAN: gY Alexander M.D. OPERATING PHYSICIAN: Yg Alexander M.D.    PATIENT ACCOUNT#:   [de-identified]    LOCATION:  [place] [address] ED  MEDICAL RECORD #:   [id number] then advanced an Omni Flush catheter to the level of the renals and performed an aortogram.  Aortogram demonstrated patent aorta, renals, iliacs without significant stenosis.   I then used a Crossover catheter and a Glidewire and advanced the Crossover cath

## 2019-01-18 ENCOUNTER — OFFICE VISIT (OUTPATIENT)
Dept: FAMILY MEDICINE CLINIC | Facility: CLINIC | Age: 84
End: 2019-01-18
Payer: MEDICARE

## 2019-01-18 ENCOUNTER — APPOINTMENT (OUTPATIENT)
Dept: LAB | Age: 84
End: 2019-01-18
Attending: FAMILY MEDICINE
Payer: MEDICARE

## 2019-01-18 VITALS
DIASTOLIC BLOOD PRESSURE: 60 MMHG | SYSTOLIC BLOOD PRESSURE: 120 MMHG | HEART RATE: 70 BPM | HEIGHT: 71 IN | TEMPERATURE: 98 F | BODY MASS INDEX: 28 KG/M2 | OXYGEN SATURATION: 98 % | WEIGHT: 200 LBS

## 2019-01-18 DIAGNOSIS — M79.675 PAIN IN TOES OF BOTH FEET: ICD-10-CM

## 2019-01-18 DIAGNOSIS — I73.9 PVD (PERIPHERAL VASCULAR DISEASE) (HCC): ICD-10-CM

## 2019-01-18 DIAGNOSIS — Z00.00 ENCOUNTER FOR ANNUAL HEALTH EXAMINATION: Primary | ICD-10-CM

## 2019-01-18 DIAGNOSIS — J43.2 CENTRILOBULAR EMPHYSEMA (HCC): ICD-10-CM

## 2019-01-18 DIAGNOSIS — E78.00 HYPERCHOLESTEREMIA: ICD-10-CM

## 2019-01-18 DIAGNOSIS — R73.03 PRE-DIABETES: ICD-10-CM

## 2019-01-18 DIAGNOSIS — N40.0 BENIGN NON-NODULAR PROSTATIC HYPERPLASIA WITHOUT LOWER URINARY TRACT SYMPTOMS: ICD-10-CM

## 2019-01-18 DIAGNOSIS — M79.674 PAIN IN TOES OF BOTH FEET: ICD-10-CM

## 2019-01-18 DIAGNOSIS — I10 ESSENTIAL HYPERTENSION: ICD-10-CM

## 2019-01-18 DIAGNOSIS — I25.10 CORONARY ARTERY DISEASE INVOLVING NATIVE CORONARY ARTERY OF NATIVE HEART WITHOUT ANGINA PECTORIS: ICD-10-CM

## 2019-01-18 DIAGNOSIS — E03.9 HYPOTHYROIDISM (ACQUIRED): ICD-10-CM

## 2019-01-18 DIAGNOSIS — D64.9 ANEMIA, UNSPECIFIED TYPE: ICD-10-CM

## 2019-01-18 DIAGNOSIS — H40.9 GLAUCOMA OF BOTH EYES, UNSPECIFIED GLAUCOMA TYPE: ICD-10-CM

## 2019-01-18 DIAGNOSIS — N18.32 CHRONIC KIDNEY DISEASE (CKD) STAGE G3B/A1, MODERATELY DECREASED GLOMERULAR FILTRATION RATE (GFR) BETWEEN 30-44 ML/MIN/1.73 SQUARE METER AND ALBUMINURIA CREATININE RATIO LESS THAN 30 MG/G (HCC): ICD-10-CM

## 2019-01-18 LAB
ALBUMIN SERPL-MCNC: 3.8 G/DL (ref 3.1–4.5)
ALBUMIN/GLOB SERPL: 1.4 {RATIO} (ref 1–2)
ALP LIVER SERPL-CCNC: 84 U/L (ref 45–117)
ALT SERPL-CCNC: 23 U/L (ref 17–63)
ANION GAP SERPL CALC-SCNC: 4 MMOL/L (ref 0–18)
AST SERPL-CCNC: 21 U/L (ref 15–41)
BASOPHILS # BLD AUTO: 0.05 X10(3) UL (ref 0–0.1)
BASOPHILS NFR BLD AUTO: 0.7 %
BILIRUB SERPL-MCNC: 0.6 MG/DL (ref 0.1–2)
BUN BLD-MCNC: 23 MG/DL (ref 8–20)
BUN/CREAT SERPL: 19.8 (ref 10–20)
CALCIUM BLD-MCNC: 8.3 MG/DL (ref 8.3–10.3)
CHLORIDE SERPL-SCNC: 110 MMOL/L (ref 101–111)
CHOLEST SMN-MCNC: 141 MG/DL (ref ?–200)
CO2 SERPL-SCNC: 29 MMOL/L (ref 22–32)
CREAT BLD-MCNC: 1.16 MG/DL (ref 0.7–1.3)
EOSINOPHIL # BLD AUTO: 0.33 X10(3) UL (ref 0–0.3)
EOSINOPHIL NFR BLD AUTO: 4.5 %
ERYTHROCYTE [DISTWIDTH] IN BLOOD BY AUTOMATED COUNT: 12.6 % (ref 11.5–16)
EST. AVERAGE GLUCOSE BLD GHB EST-MCNC: 105 MG/DL (ref 68–126)
GLOBULIN PLAS-MCNC: 2.7 G/DL (ref 2.8–4.4)
GLUCOSE BLD-MCNC: 113 MG/DL (ref 70–99)
HBA1C MFR BLD HPLC: 5.3 % (ref ?–5.7)
HCT VFR BLD AUTO: 34.9 % (ref 37–53)
HDLC SERPL-MCNC: 48 MG/DL (ref 40–59)
HGB BLD-MCNC: 12.1 G/DL (ref 13–17)
IMMATURE GRANULOCYTE COUNT: 0.02 X10(3) UL (ref 0–1)
IMMATURE GRANULOCYTE RATIO %: 0.3 %
LDLC SERPL CALC-MCNC: 75 MG/DL (ref ?–100)
LYMPHOCYTES # BLD AUTO: 1.34 X10(3) UL (ref 0.9–4)
LYMPHOCYTES NFR BLD AUTO: 18.2 %
M PROTEIN MFR SERPL ELPH: 6.5 G/DL (ref 6.4–8.2)
MCH RBC QN AUTO: 32.3 PG (ref 27–33.2)
MCHC RBC AUTO-ENTMCNC: 34.7 G/DL (ref 31–37)
MCV RBC AUTO: 93.1 FL (ref 80–99)
MONOCYTES # BLD AUTO: 0.57 X10(3) UL (ref 0.1–1)
MONOCYTES NFR BLD AUTO: 7.7 %
NEUTROPHIL ABS PRELIM: 5.05 X10 (3) UL (ref 1.3–6.7)
NEUTROPHILS # BLD AUTO: 5.05 X10(3) UL (ref 1.3–6.7)
NEUTROPHILS NFR BLD AUTO: 68.6 %
NONHDLC SERPL-MCNC: 93 MG/DL (ref ?–130)
OSMOLALITY SERPL CALC.SUM OF ELEC: 300 MOSM/KG (ref 275–295)
PLATELET # BLD AUTO: 191 10(3)UL (ref 150–450)
POTASSIUM SERPL-SCNC: 4.9 MMOL/L (ref 3.6–5.1)
RBC # BLD AUTO: 3.75 X10(6)UL (ref 3.8–5.8)
RED CELL DISTRIBUTION WIDTH-SD: 42.7 FL (ref 35.1–46.3)
SODIUM SERPL-SCNC: 143 MMOL/L (ref 136–144)
T4 FREE SERPL-MCNC: 1.3 NG/DL (ref 0.9–1.8)
TRIGL SERPL-MCNC: 91 MG/DL (ref 30–149)
TSI SER-ACNC: 1.16 MIU/ML (ref 0.35–5.5)
URATE SERPL-MCNC: 7.6 MG/DL (ref 2.4–8.7)
VLDLC SERPL CALC-MCNC: 18 MG/DL (ref 0–30)
WBC # BLD AUTO: 7.4 X10(3) UL (ref 4–13)

## 2019-01-18 PROCEDURE — 36415 COLL VENOUS BLD VENIPUNCTURE: CPT | Performed by: FAMILY MEDICINE

## 2019-01-18 PROCEDURE — 84550 ASSAY OF BLOOD/URIC ACID: CPT

## 2019-01-18 PROCEDURE — G0439 PPPS, SUBSEQ VISIT: HCPCS | Performed by: FAMILY MEDICINE

## 2019-01-18 PROCEDURE — 83036 HEMOGLOBIN GLYCOSYLATED A1C: CPT

## 2019-01-18 PROCEDURE — G0444 DEPRESSION SCREEN ANNUAL: HCPCS | Performed by: FAMILY MEDICINE

## 2019-01-18 PROCEDURE — 36415 COLL VENOUS BLD VENIPUNCTURE: CPT

## 2019-01-18 NOTE — H&P
Dalia Spangler is a 80year old male Patient presents with:  Physical: Medicare AWV    HPI:   Pt voices concerns of pain in right big toe and left 4th toe, ? \"gout\".     Wt Readings from Last 6 Encounters:  01/18/19 : 200 lb  11/28/18 : 192 lb  10/19/1 Calcium (LIPITOR) 20 MG Oral Tab Take 20 mg by mouth nightly. Disp:  Rfl:    Doxazosin Mesylate (CARDURA) 8 MG Oral Tab Take 8 mg by mouth nightly.  Disp:  Rfl:    Levothyroxine Sodium (SYNTHROID, LEVOTHROID) 125 MCG Oral Tab Take 125 mcg by mouth before br • ECHO 2D, CARDIO ([facility])  05/31/2017   • ESOPHAGOGASTRODUODENOSCOPY (EGD) N/A 1/10/2017    Performed by Jesus Estevez MD at [facility] ENDOSCOPY   • OTHER      cyst removed from posterior neck   • OTHER SURGICAL HISTORY      left leg angio   • OTHER SURGICAL once daily in am, rarely uses rescue inhaler  CARDIOVASCULAR: denies chest pain on exertion, palpations, anginal equivalent symptoms, no peripheral edema, pt saw Dr Genna Duvall 11/27/18, Dr Alicia Tubbs 7/9/18 , Dr Misty Dudley 12/10/18, pt has implanted loop recorder check disk,conjunctiva are clear  NECK: supple,no adenopathy,no bruits, no thyromegaly  LUNGS: clear to auscultation, no w/r/r  CARDIO: RRR without murmur, no S3, S4, + decrease left femoral pulse, faint pedal pulses bilaterally,no pitting lower extremity edema Please check with your insurance carrier before scheduling to verify coverage.     PREVENTATIVE SERVICES  INDICATIONS AND SCHEDULE Internal Lab or Procedure External Lab or Procedure   Diabetes Screening      HbgA1C     At Least  Annually for Diabetics A1C lifetime   • Anyone with a family history    Colorectal Cancer Screening Covered up to Age 76     Colonoscopy Screen   Covered every 10 years- more often if abnormal There are no preventive care reminders to display for this patient.  Update [facility] Persons who live in the same house as a HepB virus carrier   Homosexual men   Illicit injectable drug abusers     Tetanus Toxoid- Only covered with a cut with metal- TD and TDaP Not covered by Medicare Part B) No orders found for this or any previous vis ([address]  Continue antiplatelet therapy as well as anticoagulation. Discussed importance of daily walking regimen, follow-up with specialist as scheduled  - VENIPUNCTURE    5.  Coronary artery disease involving native coronary artery of native heart without a

## 2019-01-18 NOTE — PATIENT INSTRUCTIONS
Harrison Gonzalez's SCREENING SCHEDULE   Tests on this list are recommended by your physician but may not be covered, or covered at this frequency, by your insurer. Please check with your insurance carrier before scheduling to verify coverage.     PREVENT this or any previous visit.  Limited to patients who meet one of the following criteria:   • Men who are 73-68 years old and have smoked more than 100 cigarettes in their lifetime   • Anyone with a family history    Colorectal Cancer Screening Covered up to visit. Medium/high risk factors:   End-stage renal disease   Hemophiliacs who received Factor VIII or IX concentrates   Clients of institutions for the mentally retarded   Persons who live in the same house as a HepB virus carrier   Homosexual men   [place]ici VENIPUNCTURE    3. Hypercholesteremia  Reviewed goals for lipids,, continue statin therapy  - LIPID PANEL  - VENIPUNCTURE    4. PVD (peripheral vascular disease) ([address]  Continue antiplatelet therapy as well as anticoagulation.   Discussed importance of prashant

## 2019-01-21 DIAGNOSIS — E78.00 ELEVATED CHOLESTEROL: ICD-10-CM

## 2019-01-21 DIAGNOSIS — Z79.899 ENCOUNTER FOR LONG-TERM (CURRENT) DRUG USE: ICD-10-CM

## 2019-01-21 DIAGNOSIS — E03.9 HYPOTHYROIDISM (ACQUIRED): Primary | ICD-10-CM

## 2019-01-21 DIAGNOSIS — E79.0 ELEVATED URIC ACID IN BLOOD: ICD-10-CM

## 2019-01-21 RX ORDER — ALLOPURINOL 100 MG/1
100 TABLET ORAL DAILY
Qty: 90 TABLET | Refills: 0 | Status: SHIPPED | OUTPATIENT
Start: 2019-01-21 | End: 2019-05-20 | Stop reason: DRUGHIGH

## 2019-01-21 RX ORDER — ASPIRIN 325 MG
1 TABLET ORAL DAILY
Qty: 1 TABLET | Refills: 0 | COMMUNITY
Start: 2019-01-21 | End: 2019-05-20 | Stop reason: ALTCHOICE

## 2019-01-29 RX ORDER — DOXAZOSIN 8 MG/1
TABLET ORAL
COMMUNITY

## 2019-01-29 RX ORDER — LATANOPROST 50 UG/ML
SOLUTION/ DROPS OPHTHALMIC
COMMUNITY

## 2019-01-29 RX ORDER — DORZOLAMIDE HCL 20 MG/ML
SOLUTION/ DROPS OPHTHALMIC
COMMUNITY

## 2019-01-29 RX ORDER — ASPIRIN 81 MG/1
TABLET ORAL
COMMUNITY

## 2019-01-29 RX ORDER — MULTIVIT-MIN/FOLIC/VIT K/LYCOP 400-300MCG
TABLET ORAL
COMMUNITY

## 2019-01-29 RX ORDER — ATORVASTATIN CALCIUM 20 MG/1
TABLET, FILM COATED ORAL
COMMUNITY

## 2019-01-30 ENCOUNTER — OFFICE VISIT (OUTPATIENT)
Dept: DERMATOLOGY | Age: 84
End: 2019-01-30

## 2019-01-30 DIAGNOSIS — L30.9 DERMATITIS: Primary | ICD-10-CM

## 2019-01-30 PROCEDURE — 96372 THER/PROPH/DIAG INJ SC/IM: CPT | Performed by: DERMATOLOGY

## 2019-01-30 PROCEDURE — 99214 OFFICE O/P EST MOD 30 MIN: CPT | Performed by: DERMATOLOGY

## 2019-01-30 RX ORDER — TRIAMCINOLONE ACETONIDE 40 MG/ML
60 INJECTION, SUSPENSION INTRA-ARTICULAR; INTRAMUSCULAR ONCE
Status: COMPLETED | OUTPATIENT
Start: 2019-01-30 | End: 2019-01-30

## 2019-01-30 RX ADMIN — TRIAMCINOLONE ACETONIDE 60 MG: 40 INJECTION, SUSPENSION INTRA-ARTICULAR; INTRAMUSCULAR at 13:54

## 2019-03-04 ENCOUNTER — EXTERNAL RECORD (OUTPATIENT)
Dept: OTHER | Age: 84
End: 2019-03-04

## 2019-03-07 ENCOUNTER — OFFICE VISIT (OUTPATIENT)
Dept: FAMILY MEDICINE CLINIC | Facility: CLINIC | Age: 84
End: 2019-03-07
Payer: MEDICARE

## 2019-03-07 VITALS
OXYGEN SATURATION: 98 % | SYSTOLIC BLOOD PRESSURE: 130 MMHG | BODY MASS INDEX: 28 KG/M2 | HEART RATE: 62 BPM | WEIGHT: 198 LBS | DIASTOLIC BLOOD PRESSURE: 40 MMHG | TEMPERATURE: 98 F

## 2019-03-07 DIAGNOSIS — I10 ESSENTIAL HYPERTENSION: Primary | ICD-10-CM

## 2019-03-07 DIAGNOSIS — R00.2 PALPITATIONS: ICD-10-CM

## 2019-03-07 DIAGNOSIS — I25.10 CORONARY ARTERY DISEASE INVOLVING NATIVE CORONARY ARTERY OF NATIVE HEART WITHOUT ANGINA PECTORIS: ICD-10-CM

## 2019-03-07 PROCEDURE — 1111F DSCHRG MED/CURRENT MED MERGE: CPT | Performed by: FAMILY MEDICINE

## 2019-03-07 PROCEDURE — 99213 OFFICE O/P EST LOW 20 MIN: CPT | Performed by: FAMILY MEDICINE

## 2019-03-07 NOTE — PATIENT INSTRUCTIONS
I reviewed available hospital records including labs, imaging, stress test and echo.   I have asked the patient to begin monitoring his blood pressure on a daily basis at various times of the day  Continue current medication and daily aerobic activity as to

## 2019-03-07 NOTE — PROGRESS NOTES
HPI:    Patient ID: Elder Dunne is a 80year old male. Patient presents with:  Hospital F/U: palpitations and elevated bp      Patient presented to [facility] emergency room on 3/4/19 with complaints of elevated blood pressure and palpitations.   Felt Doxazosin Mesylate (CARDURA) 8 MG Oral Tab Take 8 mg by mouth nightly.  Disp:  Rfl:    Levothyroxine Sodium (SYNTHROID, LEVOTHROID) 125 MCG Oral Tab Take 125 mcg by mouth before breakfast. Disp:  Rfl:    Cranberry 125 MG Oral Tab Take 1 tablet by mouth da and dry. Psychiatric: His speech is normal. Judgment and thought content normal. His affect is blunt. He is slowed.  Cognition and memory are normal.    Blood pressure 130/40, pulse 62, temperature 98 °F (36.7 °C), temperature source Temporal, weight 198

## 2019-03-15 ENCOUNTER — TELEPHONE (OUTPATIENT)
Dept: FAMILY MEDICINE CLINIC | Facility: CLINIC | Age: 84
End: 2019-03-15

## 2019-03-15 RX ORDER — AMLODIPINE BESYLATE 5 MG/1
10 TABLET ORAL DAILY
COMMUNITY
End: 2019-03-18 | Stop reason: DRUGHIGH

## 2019-03-15 NOTE — TELEPHONE ENCOUNTER
Contacted patient, notified of Dr. Fely Kapadia response. Verbalizes understanding. Will continue with office visit on Monday.  Instructed to go the Emergency room if he develops chest pain, shortness of breath or symptoms of elevated blood pressure, verbali

## 2019-03-15 NOTE — TELEPHONE ENCOUNTER
PT SAYS HE IS HAVING A PROBLEM WITH HIS BLOOD PRESSURE. HE WOULD LIKE TO SPEAK WITH A NURSE. HE SAYS IT IS \"URGENT. \" Vicky Cousin

## 2019-03-15 NOTE — TELEPHONE ENCOUNTER
Contacted patient. States he was seen by Dr. Hawa Joyce as a follow up from his ER visit (3/4) for elevated BP. In the office his blood pressure was 130/40, HR 62.  He was started on amlodipine 5mg every morning in the ER and took all blood pressure medicatio

## 2019-03-18 ENCOUNTER — OFFICE VISIT (OUTPATIENT)
Dept: FAMILY MEDICINE CLINIC | Facility: CLINIC | Age: 84
End: 2019-03-18
Payer: MEDICARE

## 2019-03-18 VITALS
TEMPERATURE: 98 F | OXYGEN SATURATION: 98 % | HEART RATE: 51 BPM | DIASTOLIC BLOOD PRESSURE: 58 MMHG | SYSTOLIC BLOOD PRESSURE: 134 MMHG | BODY MASS INDEX: 28 KG/M2 | WEIGHT: 198 LBS

## 2019-03-18 DIAGNOSIS — I10 ESSENTIAL HYPERTENSION: Primary | ICD-10-CM

## 2019-03-18 PROCEDURE — 99213 OFFICE O/P EST LOW 20 MIN: CPT | Performed by: FAMILY MEDICINE

## 2019-03-18 RX ORDER — CARVEDILOL 25 MG/1
25 TABLET ORAL 2 TIMES DAILY WITH MEALS
Refills: 0 | COMMUNITY
Start: 2019-03-18 | End: 2020-01-27

## 2019-03-18 RX ORDER — AMLODIPINE BESYLATE 5 MG/1
5 TABLET ORAL 2 TIMES DAILY
Qty: 180 TABLET | Refills: 0 | Status: SHIPPED | OUTPATIENT
Start: 2019-03-18 | End: 2019-05-07

## 2019-03-18 NOTE — PATIENT INSTRUCTIONS
I reviewed goals for blood pressure as well as conservative management of hypertension including sodium restriction, daily aerobic activity, alcohol moderation, smoking cessation, and maintaining ideal body weight.   Patient medication reviewed and medicati

## 2019-03-18 NOTE — PROGRESS NOTES
HPI:    Patient ID: Zayda Gonzalez is a 80year old male. Patient presents with:  Blood Pressure: no symptoms. getting higher in evening.    Here with his wife  Pt reports bp higher later in the day last week, up to 185/70  Pt was told to increase aml Rfl:    brimonidine Tartrate (ALPHAGAN) 0.2 % Ophthalmic Solution Place 1 drop into both eyes 3 (three) times daily. Disp:  Rfl:    Dorzolamide HCl (TRUSOPT) 2 % Ophthalmic Solution Place 1 drop into both eyes 3 (three) times daily.    Disp:  Rfl:    jasmine management of hypertension including sodium restriction, daily aerobic activity, alcohol moderation, smoking cessation, and maintaining ideal body weight.   Patient medication reviewed and medication updated  Would recommend splitting amlodipine dose to 5 m

## 2019-05-07 ENCOUNTER — OFFICE VISIT (OUTPATIENT)
Dept: FAMILY MEDICINE CLINIC | Facility: CLINIC | Age: 84
End: 2019-05-07
Payer: MEDICARE

## 2019-05-07 ENCOUNTER — HOSPITAL ENCOUNTER (OUTPATIENT)
Dept: GENERAL RADIOLOGY | Age: 84
Discharge: HOME | End: 2019-05-07
Attending: FAMILY MEDICINE
Payer: MEDICARE

## 2019-05-07 VITALS
HEART RATE: 66 BPM | DIASTOLIC BLOOD PRESSURE: 62 MMHG | SYSTOLIC BLOOD PRESSURE: 162 MMHG | WEIGHT: 202 LBS | TEMPERATURE: 98 F | OXYGEN SATURATION: 95 % | BODY MASS INDEX: 28 KG/M2

## 2019-05-07 DIAGNOSIS — I10 ESSENTIAL HYPERTENSION: ICD-10-CM

## 2019-05-07 DIAGNOSIS — R60.9 DEPENDENT EDEMA: ICD-10-CM

## 2019-05-07 DIAGNOSIS — J43.2 CENTRILOBULAR EMPHYSEMA (HCC): ICD-10-CM

## 2019-05-07 DIAGNOSIS — J81.0 ACUTE PULMONARY EDEMA (HCC): ICD-10-CM

## 2019-05-07 DIAGNOSIS — R06.02 SOB (SHORTNESS OF BREATH): Primary | ICD-10-CM

## 2019-05-07 DIAGNOSIS — R06.02 SOB (SHORTNESS OF BREATH): ICD-10-CM

## 2019-05-07 PROCEDURE — 71046 X-RAY EXAM CHEST 2 VIEWS: CPT | Performed by: FAMILY MEDICINE

## 2019-05-07 PROCEDURE — 99214 OFFICE O/P EST MOD 30 MIN: CPT | Performed by: FAMILY MEDICINE

## 2019-05-07 RX ORDER — AMLODIPINE BESYLATE 5 MG/1
5 TABLET ORAL DAILY
Qty: 180 TABLET | Refills: 0 | COMMUNITY
Start: 2019-05-07 | End: 2020-02-10

## 2019-05-07 RX ORDER — FUROSEMIDE 40 MG/1
40 TABLET ORAL EVERY MORNING
Qty: 10 TABLET | Refills: 0 | Status: SHIPPED | OUTPATIENT
Start: 2019-05-07 | End: 2019-05-13

## 2019-05-07 NOTE — PATIENT INSTRUCTIONS
I reviewed x-ray findings as well as potential medications that may be contributing to dependent edema. We will decrease amlodipine by 50% to 5 mg daily  Continue all other same medications.   We will add furosemide 40 mg every morning for the next 5 days

## 2019-05-07 NOTE — PROGRESS NOTES
HPI:    Patient ID: Tha Malloy is a 80year old male.     Patient presents with:  Shortness Of Breath: for a couple days    Pt with known COPD  Slight cough x 7-10 days, non-productive, SOB x a few days  Using spiriva daily used  Used proair once, no ENDOSCOPY   • LEXISCAN NUC STRESS TST, CARDIO ([facility])  03/04/2019    neg perfusion scan, normal   • OTHER      cyst removed from posterior neck   • OTHER SURGICAL HISTORY      left leg angio   • OTHER SURGICAL HISTORY      partial thyroidectomy   • OTHER FRANCISCO mouth nightly. Disp:  Rfl:    Levothyroxine Sodium (SYNTHROID, LEVOTHROID) 125 MCG Oral Tab Take 125 mcg by mouth before breakfast. Disp:  Rfl:    Cranberry 125 MG Oral Tab Take 1 tablet by mouth daily.  Disp:  Rfl:    Ipratropium-Albuterol  MCG/ACT I Psychiatric: His speech is normal. His affect is blunt. Blood pressure (!) 162/62, pulse 66, temperature 97.9 °F (36.6 °C), temperature source Temporal, weight 202 lb, SpO2 95 %.   CXR: copd findings, increased venous pulmonary markings, no infiltrate

## 2019-05-13 ENCOUNTER — TELEPHONE (OUTPATIENT)
Dept: FAMILY MEDICINE CLINIC | Facility: CLINIC | Age: 84
End: 2019-05-13

## 2019-05-13 RX ORDER — FUROSEMIDE 40 MG/1
40 TABLET ORAL EVERY MORNING
Qty: 10 TABLET | Refills: 0 | Status: SHIPPED | OUTPATIENT
Start: 2019-05-13 | End: 2019-05-20

## 2019-05-13 NOTE — TELEPHONE ENCOUNTER
Pt calls with an update after his 5/7/OV. States his SOB and breathing have improved, but still gets SOB w/ exertion. Pt decreased his amlodipine to 5mg qd and added furosemide 40mg qd x5days.   Reports RLE edema is better in the morning, but by the end

## 2019-05-13 NOTE — TELEPHONE ENCOUNTER
Continue furosemide 40 mg every morning as well as amlodipine 5 mg  Follow-up 1 week for reevaluation and labs to check potassium.

## 2019-05-20 ENCOUNTER — OFFICE VISIT (OUTPATIENT)
Dept: FAMILY MEDICINE CLINIC | Facility: CLINIC | Age: 84
End: 2019-05-20
Payer: MEDICARE

## 2019-05-20 ENCOUNTER — APPOINTMENT (OUTPATIENT)
Dept: LAB | Age: 84
End: 2019-05-20
Attending: FAMILY MEDICINE
Payer: MEDICARE

## 2019-05-20 VITALS
WEIGHT: 195 LBS | HEIGHT: 71.75 IN | SYSTOLIC BLOOD PRESSURE: 130 MMHG | TEMPERATURE: 98 F | HEART RATE: 61 BPM | OXYGEN SATURATION: 94 % | BODY MASS INDEX: 26.7 KG/M2 | DIASTOLIC BLOOD PRESSURE: 50 MMHG | RESPIRATION RATE: 12 BRPM

## 2019-05-20 DIAGNOSIS — R60.9 DEPENDENT EDEMA: ICD-10-CM

## 2019-05-20 DIAGNOSIS — Z51.81 MEDICATION MONITORING ENCOUNTER: Primary | ICD-10-CM

## 2019-05-20 DIAGNOSIS — R06.02 SOB (SHORTNESS OF BREATH): ICD-10-CM

## 2019-05-20 DIAGNOSIS — J43.2 CENTRILOBULAR EMPHYSEMA (HCC): ICD-10-CM

## 2019-05-20 DIAGNOSIS — E79.0 ELEVATED URIC ACID IN BLOOD: ICD-10-CM

## 2019-05-20 DIAGNOSIS — Z51.81 MEDICATION MONITORING ENCOUNTER: ICD-10-CM

## 2019-05-20 DIAGNOSIS — I10 ESSENTIAL HYPERTENSION: ICD-10-CM

## 2019-05-20 PROCEDURE — 82565 ASSAY OF CREATININE: CPT

## 2019-05-20 PROCEDURE — 83735 ASSAY OF MAGNESIUM: CPT

## 2019-05-20 PROCEDURE — 36415 COLL VENOUS BLD VENIPUNCTURE: CPT

## 2019-05-20 PROCEDURE — 99213 OFFICE O/P EST LOW 20 MIN: CPT | Performed by: FAMILY MEDICINE

## 2019-05-20 PROCEDURE — 84550 ASSAY OF BLOOD/URIC ACID: CPT

## 2019-05-20 PROCEDURE — 80051 ELECTROLYTE PANEL: CPT

## 2019-05-20 PROCEDURE — 84520 ASSAY OF UREA NITROGEN: CPT

## 2019-05-20 RX ORDER — FUROSEMIDE 40 MG/1
40 TABLET ORAL EVERY MORNING
Qty: 5 TABLET | Refills: 0 | COMMUNITY
Start: 2019-05-20 | End: 2020-01-27

## 2019-05-20 RX ORDER — RIBOFLAVIN (VITAMIN B2) 100 MG
100 TABLET ORAL DAILY
COMMUNITY

## 2019-05-20 NOTE — PROGRESS NOTES
HPI:    Patient ID: Ele Penaloza is a 80year old male. Patient was seen by his cardiologist, Dr. Jonah Almendarez, on 5/2/2019 without any documented complaints.   Patient states that he did note edema and some shortness of breath however this was not felt t loratadine 10 MG Oral Tab Take 10 mg by mouth daily. Disp:  Rfl:    Omega-3 Fatty Acids (FISH OIL OR) Take by mouth. Disp:  Rfl:    vitamin E 400 UNITS Oral Cap Take 1 capsule by mouth daily.    Disp:  Rfl:    hydrochlorothiazide 25 MG Oral Tab Take 25 m distress. He has decreased breath sounds ( Diffuse distant breath sounds). He has no wheezes. He has no rhonchi. He has no rales.    Musculoskeletal:        Right lower leg: Normal.        Right foot: Normal.   Neurological: He is alert and oriented to pers

## 2019-06-17 ENCOUNTER — TELEPHONE (OUTPATIENT)
Dept: FAMILY MEDICINE CLINIC | Facility: CLINIC | Age: 84
End: 2019-06-17

## 2019-06-17 ENCOUNTER — OFFICE VISIT (OUTPATIENT)
Dept: FAMILY MEDICINE CLINIC | Facility: CLINIC | Age: 84
End: 2019-06-17
Payer: MEDICARE

## 2019-06-17 VITALS
RESPIRATION RATE: 16 BRPM | WEIGHT: 197 LBS | HEART RATE: 56 BPM | BODY MASS INDEX: 26.98 KG/M2 | TEMPERATURE: 98 F | DIASTOLIC BLOOD PRESSURE: 68 MMHG | HEIGHT: 71.75 IN | SYSTOLIC BLOOD PRESSURE: 128 MMHG

## 2019-06-17 DIAGNOSIS — J43.2 CENTRILOBULAR EMPHYSEMA (HCC): ICD-10-CM

## 2019-06-17 DIAGNOSIS — J44.1 COPD EXACERBATION (HCC): Primary | ICD-10-CM

## 2019-06-17 PROCEDURE — 99213 OFFICE O/P EST LOW 20 MIN: CPT | Performed by: FAMILY MEDICINE

## 2019-06-17 RX ORDER — PREDNISONE 20 MG/1
TABLET ORAL
Qty: 11 TABLET | Refills: 0 | Status: SHIPPED | OUTPATIENT
Start: 2019-06-17 | End: 2019-06-24

## 2019-06-17 NOTE — PROGRESS NOTES
HPI:    Patient ID: Alejandra Lloyd is a 80year old male.     Patient presents with:  Cough    Hx of COPD  X 1 week,  Felt \"horrible\", + stuffy nose, slight cough, no fever, seems to be getting better but cough persists, no cough at night  Using proair mouth nightly. Disp:  Rfl:    Levothyroxine Sodium (SYNTHROID, LEVOTHROID) 125 MCG Oral Tab Take 125 mcg by mouth before breakfast. Disp:  Rfl:    Cranberry 125 MG Oral Tab Take 1 tablet by mouth daily.  Disp:  Rfl:    Ipratropium-Albuterol  MCG/ACT I Pulmonary/Chest: Effort normal. No accessory muscle usage. No respiratory distress. He has no decreased breath sounds. He has wheezes ( coarse diffuse exp wheezes with cough). He has no rhonchi. He has no rales.    Neurological: He is alert and oriented t

## 2019-06-17 NOTE — TELEPHONE ENCOUNTER
TERESSA HAS A BAD COLD, HE IS ASKING TO COME IN TODAY, HIS APPT TIME IS SCHED FOR A HALF HOUR,  DOESN'T HAVE ENOUGH TIME TODAY.

## 2019-06-17 NOTE — PATIENT INSTRUCTIONS
Push fluids, nasal saline ad gloria., Mucinex or equivalent expectorant twice daily  May use albuterol 2 puffs up to every 4 hours as needed  Prednisone 20 mg twice daily x4 days followed by 20 mg daily x3 days  Reviewed signs and symptoms of bacterial infect

## 2019-07-02 ENCOUNTER — OFFICE VISIT (OUTPATIENT)
Dept: FAMILY MEDICINE CLINIC | Facility: CLINIC | Age: 84
End: 2019-07-02
Payer: MEDICARE

## 2019-07-02 VITALS
OXYGEN SATURATION: 99 % | TEMPERATURE: 99 F | HEART RATE: 73 BPM | SYSTOLIC BLOOD PRESSURE: 130 MMHG | BODY MASS INDEX: 26 KG/M2 | WEIGHT: 188 LBS | DIASTOLIC BLOOD PRESSURE: 60 MMHG

## 2019-07-02 DIAGNOSIS — R39.11 BENIGN PROSTATIC HYPERPLASIA WITH URINARY HESITANCY: Primary | ICD-10-CM

## 2019-07-02 DIAGNOSIS — N40.1 BENIGN PROSTATIC HYPERPLASIA WITH URINARY HESITANCY: Primary | ICD-10-CM

## 2019-07-02 LAB
APPEARANCE: CLEAR
BILIRUBIN: NEGATIVE
GLUCOSE (URINE DIPSTICK): NEGATIVE MG/DL
KETONES (URINE DIPSTICK): NEGATIVE MG/DL
LEUKOCYTES: NEGATIVE
MULTISTIX LOT#: NORMAL NUMERIC
NITRITE, URINE: NEGATIVE
OCCULT BLOOD: NEGATIVE
PH, URINE: 5.5 (ref 4.5–8)
PROTEIN (URINE DIPSTICK): NEGATIVE MG/DL
SPECIFIC GRAVITY: 1.01 (ref 1–1.03)
UROBILINOGEN,SEMI-QN: 1 MG/DL (ref 0–1.9)

## 2019-07-02 PROCEDURE — 87086 URINE CULTURE/COLONY COUNT: CPT | Performed by: FAMILY MEDICINE

## 2019-07-02 PROCEDURE — 81003 URINALYSIS AUTO W/O SCOPE: CPT | Performed by: FAMILY MEDICINE

## 2019-07-02 PROCEDURE — 99213 OFFICE O/P EST LOW 20 MIN: CPT | Performed by: FAMILY MEDICINE

## 2019-07-02 RX ORDER — DOXAZOSIN 8 MG/1
8 TABLET ORAL NIGHTLY
Qty: 7 TABLET | Refills: 0 | Status: SHIPPED | OUTPATIENT
Start: 2019-07-02

## 2019-07-02 NOTE — PATIENT INSTRUCTIONS
We will send urine for culture  Short-term prescription for doxazosin 8 mg nightly sent to local pharmacy while patient is waiting for his mail order supply  Patient advised that if he gets increasingly uncomfortable and is unable to empty his bladder, the

## 2019-07-02 NOTE — PROGRESS NOTES
HPI:    Patient ID: Lelo Snell is a 80year old male.     Patient presents with:  Urinary Urgency: x 2 days    X 2 days urinary hesitancy, dribbling, voiding small amounts  No pain or burning with urination, patient saw his urologist in April  Lois Levothyroxine Sodium (SYNTHROID, LEVOTHROID) 125 MCG Oral Tab Take 125 mcg by mouth before breakfast. Disp:  Rfl:    Cranberry 125 MG Oral Tab Take 1 tablet by mouth daily.  Disp:  Rfl:    Ipratropium-Albuterol  MCG/ACT Inhalation Aero Soln Inhale i Negative mg/dL    Spec Gravity 1.015 1.005 - 1.030    Blood Urine Negative Negative    PH Urine 5.5 4.5 - 8.0    Protein Urine Negative Negative/Trace mg/dL    Urobilinogen Urine 1.0 0.0 - 1.9 mg/dL    Nitrite Urine Negative Negative    Leukocyte Esterase

## 2019-08-13 RX ORDER — AMLODIPINE BESYLATE 5 MG/1
TABLET ORAL
Qty: 180 TABLET | Refills: 0 | Status: SHIPPED | OUTPATIENT
Start: 2019-08-13 | End: 2019-12-18

## 2019-08-13 RX ORDER — ALLOPURINOL 300 MG/1
TABLET ORAL
Qty: 90 TABLET | Refills: 0 | Status: SHIPPED | OUTPATIENT
Start: 2019-08-13 | End: 2019-11-16

## 2019-08-13 NOTE — TELEPHONE ENCOUNTER
Last OV: 7/2/2019  Last labs: 5/20/2019  Amlodipine: 5/7/2019 #180 no RF  Allopurinol: 5/20/2019 #90 no RF

## 2019-09-30 ENCOUNTER — APPOINTMENT (OUTPATIENT)
Dept: LAB | Age: 84
End: 2019-09-30
Attending: FAMILY MEDICINE
Payer: MEDICARE

## 2019-09-30 DIAGNOSIS — E78.00 ELEVATED CHOLESTEROL: ICD-10-CM

## 2019-09-30 DIAGNOSIS — E79.0 ELEVATED URIC ACID IN BLOOD: ICD-10-CM

## 2019-09-30 DIAGNOSIS — Z79.899 ENCOUNTER FOR LONG-TERM (CURRENT) DRUG USE: ICD-10-CM

## 2019-09-30 DIAGNOSIS — E03.9 HYPOTHYROIDISM (ACQUIRED): ICD-10-CM

## 2019-09-30 PROCEDURE — 80061 LIPID PANEL: CPT

## 2019-09-30 PROCEDURE — 80053 COMPREHEN METABOLIC PANEL: CPT

## 2019-09-30 PROCEDURE — 36415 COLL VENOUS BLD VENIPUNCTURE: CPT

## 2019-09-30 PROCEDURE — 84443 ASSAY THYROID STIM HORMONE: CPT

## 2019-09-30 PROCEDURE — 84439 ASSAY OF FREE THYROXINE: CPT

## 2019-09-30 PROCEDURE — 84550 ASSAY OF BLOOD/URIC ACID: CPT

## 2019-10-03 DIAGNOSIS — Z79.899 ENCOUNTER FOR LONG-TERM (CURRENT) DRUG USE: ICD-10-CM

## 2019-10-03 DIAGNOSIS — E79.0 ELEVATED URIC ACID IN BLOOD: ICD-10-CM

## 2019-10-03 DIAGNOSIS — E78.00 HYPERCHOLESTEREMIA: Primary | ICD-10-CM

## 2019-10-03 DIAGNOSIS — E03.9 HYPOTHYROIDISM (ACQUIRED): ICD-10-CM

## 2019-11-18 RX ORDER — ALLOPURINOL 300 MG/1
TABLET ORAL
Qty: 90 TABLET | Refills: 0 | Status: SHIPPED | OUTPATIENT
Start: 2019-11-18 | End: 2020-02-12

## 2019-11-18 NOTE — TELEPHONE ENCOUNTER
Last office visit: 7/2/19  Last refill: 8/13/19  Labs Due: 4/3/2020  Future Appointments   Date Time Provider Wade Lawrnece   1/27/2020  9:30 AM Kaden Daly, DO EM[facility]

## 2019-11-21 ENCOUNTER — OFFICE VISIT (OUTPATIENT)
Dept: DERMATOLOGY | Age: 84
End: 2019-11-21

## 2019-11-21 DIAGNOSIS — L30.9 DERMATITIS: Primary | ICD-10-CM

## 2019-11-21 PROCEDURE — 96372 THER/PROPH/DIAG INJ SC/IM: CPT | Performed by: DERMATOLOGY

## 2019-11-21 PROCEDURE — 99213 OFFICE O/P EST LOW 20 MIN: CPT | Performed by: DERMATOLOGY

## 2019-11-21 RX ORDER — TRIAMCINOLONE ACETONIDE 40 MG/ML
60 INJECTION, SUSPENSION INTRA-ARTICULAR; INTRAMUSCULAR ONCE
Status: COMPLETED | OUTPATIENT
Start: 2019-11-21 | End: 2019-11-21

## 2019-11-21 RX ADMIN — TRIAMCINOLONE ACETONIDE 60 MG: 40 INJECTION, SUSPENSION INTRA-ARTICULAR; INTRAMUSCULAR at 09:31

## 2019-11-22 ENCOUNTER — OFFICE VISIT (OUTPATIENT)
Dept: FAMILY MEDICINE CLINIC | Facility: CLINIC | Age: 84
End: 2019-11-22
Payer: MEDICARE

## 2019-11-22 VITALS
HEART RATE: 60 BPM | DIASTOLIC BLOOD PRESSURE: 56 MMHG | TEMPERATURE: 98 F | SYSTOLIC BLOOD PRESSURE: 134 MMHG | OXYGEN SATURATION: 99 % | WEIGHT: 191 LBS | BODY MASS INDEX: 26 KG/M2

## 2019-11-22 DIAGNOSIS — K42.9 UMBILICAL HERNIA WITHOUT OBSTRUCTION AND WITHOUT GANGRENE: Primary | ICD-10-CM

## 2019-11-22 PROCEDURE — 99213 OFFICE O/P EST LOW 20 MIN: CPT | Performed by: FAMILY MEDICINE

## 2019-11-22 NOTE — PROGRESS NOTES
HPI:    Patient ID: Lluvia Haas is a 80year old male. Patient presents with:  Umbilical Hernia: happening some time now, in then out, some pain now.     Here w/ his wife  Patient reports that he has had a lump at his bellybutton off-and-on for price Performed by Fanta Price MD at [facility] MAIN OR   • SHOULDER ARTHROSCOPY ROTATOR CUFF REPAIR Right 11/10/2015    Performed by Fanta Price MD at [address] [place] [address] (CPT=[id number])  07/10/2018    Less than 50% narrowing in bilateral inte • Ipratropium-Albuterol  MCG/ACT Inhalation Aero Soln Inhale into the lungs 4 (four) times daily. • brimonidine Tartrate (ALPHAGAN) 0.2 % Ophthalmic Solution Place 1 drop into both eyes 3 (three) times daily.        • Dorzolamide HCl (TRUSOPT) 2

## 2019-11-22 NOTE — PATIENT INSTRUCTIONS
I discussed the presence of the hernia as well as contributing factors. I reviewed signs and symptoms of incarceration and need for urgent repair  Patient referred to Dr. Chucho Campos for surgical opinion and eventual repair.   I discussed importance of avoiding

## 2019-12-03 ENCOUNTER — OFFICE VISIT (OUTPATIENT)
Dept: SURGERY | Facility: CLINIC | Age: 84
End: 2019-12-03
Payer: MEDICARE

## 2019-12-03 VITALS — HEIGHT: 72 IN | TEMPERATURE: 98 F | BODY MASS INDEX: 25.47 KG/M2 | HEART RATE: 60 BPM | WEIGHT: 188 LBS

## 2019-12-03 DIAGNOSIS — K42.9 UMBILICAL HERNIA WITHOUT OBSTRUCTION AND WITHOUT GANGRENE: Primary | ICD-10-CM

## 2019-12-03 PROCEDURE — 99203 OFFICE O/P NEW LOW 30 MIN: CPT | Performed by: SURGERY

## 2019-12-03 NOTE — H&P
Smooth Locke is a 80year old male  Patient presents with:  Hernia: New patient referred Dr. Roger Ortega for umbilical hernia consult. c/o tenderness       REFERRED BY    Patient presents with umbilical hernia.   Patient states he has noticed it for the l cardiac ablation w/ Dr Sofia Chatman for atrial flutter   • SHOULDER ARTHROSCOPY ROTATOR CUFF REPAIR Left 4/26/2016    Performed by Lisha Vallejo MD at [address] [place] [address]   • SHOULDER ARTHROSCOPY ROTATOR CUFF REPAIR Right 11/10/2015    Performed by Lisha Vallejo MD at  , Rfl:   brimonidine Tartrate (ALPHAGAN) 0.2 % Ophthalmic Solution, Place 1 drop into both eyes 3 (three) times daily. , Disp: , Rfl:   Dorzolamide HCl (TRUSOPT) 2 % Ophthalmic Solution, Place 1 drop into both eyes 3 (three) times daily.   , Disp: , Rfl: (85.3 kg). GENERAL: well developed, well nourished male, in no apparent distress.     MENTAL STATUS :Alert, oriented x 3  PSYCH: normal mood and affect  SKIN: anicteric, no rashes, no bruising  EYES: PERRLA, EOMI, sclera anicteric,  conjunctiva without pal risk factor. An HDL cholesterol >60 mg/dL is a negative risk factor for coronary heart disease.        • Triglycerides 09/30/2019 65  30 - 149 mg/dL Final      Reference interval for fasting triglycerides  Desirable: <150 mg/dL  Borderline: 150-199 mg/dL  H of the hernia. We will have Dr. Nelida Philippe manages his Xarelto in the perioperative.       Meds & Refills for this Visit:  Requested Prescriptions      No prescriptions requested or ordered in this encounter       Imaging & Consults:  None

## 2019-12-13 ENCOUNTER — TELEPHONE (OUTPATIENT)
Dept: FAMILY MEDICINE CLINIC | Facility: CLINIC | Age: 84
End: 2019-12-13

## 2019-12-13 NOTE — TELEPHONE ENCOUNTER
Surgery is Jan10, 2020    Per letter from Dr Ohara Rear office    Pre-op is needed. No recent EKG      Thierry Whitman D.O.     Surgical Clearance Needed    Date: 12/3/2019                                                                       From: Meseret Pedro     At

## 2019-12-13 NOTE — TELEPHONE ENCOUNTER
James Garcias was referred to Dr Dulce Maria Rose and he is going to have to have sx he was told that he needs to be cleared by Dr Charlotte Brown was wondering if he needs to set up an appt or would Dr Олег Perea be able to clear him with out coming in

## 2019-12-17 NOTE — PROGRESS NOTES
Please contact [facility] cardiology, Dr. Clement Linares, for recommendations on discontinuing anticoagulant prior to elective umbilical herniorrhaphy

## 2019-12-20 ENCOUNTER — OFFICE VISIT (OUTPATIENT)
Dept: FAMILY MEDICINE CLINIC | Facility: CLINIC | Age: 84
End: 2019-12-20
Payer: MEDICARE

## 2019-12-20 ENCOUNTER — APPOINTMENT (OUTPATIENT)
Dept: LAB | Age: 84
End: 2019-12-20
Attending: FAMILY MEDICINE
Payer: MEDICARE

## 2019-12-20 VITALS
DIASTOLIC BLOOD PRESSURE: 50 MMHG | OXYGEN SATURATION: 99 % | BODY MASS INDEX: 26.48 KG/M2 | HEART RATE: 53 BPM | SYSTOLIC BLOOD PRESSURE: 136 MMHG | WEIGHT: 185 LBS | TEMPERATURE: 98 F | HEIGHT: 70.25 IN

## 2019-12-20 DIAGNOSIS — E03.9 HYPOTHYROIDISM (ACQUIRED): ICD-10-CM

## 2019-12-20 DIAGNOSIS — I35.0 AORTIC STENOSIS, MILD: ICD-10-CM

## 2019-12-20 DIAGNOSIS — I25.10 CORONARY ARTERY DISEASE INVOLVING NATIVE CORONARY ARTERY OF NATIVE HEART WITHOUT ANGINA PECTORIS: ICD-10-CM

## 2019-12-20 DIAGNOSIS — E78.00 HYPERCHOLESTEREMIA: ICD-10-CM

## 2019-12-20 DIAGNOSIS — R73.03 PRE-DIABETES: ICD-10-CM

## 2019-12-20 DIAGNOSIS — N40.1 BENIGN PROSTATIC HYPERPLASIA WITH URINARY HESITANCY: ICD-10-CM

## 2019-12-20 DIAGNOSIS — I10 ESSENTIAL HYPERTENSION: ICD-10-CM

## 2019-12-20 DIAGNOSIS — K42.9 UMBILICAL HERNIA WITHOUT OBSTRUCTION AND WITHOUT GANGRENE: ICD-10-CM

## 2019-12-20 DIAGNOSIS — Z01.818 PREOP EXAMINATION: Primary | ICD-10-CM

## 2019-12-20 DIAGNOSIS — N18.32 CHRONIC KIDNEY DISEASE (CKD) STAGE G3B/A1, MODERATELY DECREASED GLOMERULAR FILTRATION RATE (GFR) BETWEEN 30-44 ML/MIN/1.73 SQUARE METER AND ALBUMINURIA CREATININE RATIO LESS THAN 30 MG/G (HCC): ICD-10-CM

## 2019-12-20 DIAGNOSIS — I73.9 PVD (PERIPHERAL VASCULAR DISEASE) (HCC): ICD-10-CM

## 2019-12-20 DIAGNOSIS — Z01.818 PREOP EXAMINATION: ICD-10-CM

## 2019-12-20 DIAGNOSIS — J43.2 CENTRILOBULAR EMPHYSEMA (HCC): ICD-10-CM

## 2019-12-20 DIAGNOSIS — R39.11 BENIGN PROSTATIC HYPERPLASIA WITH URINARY HESITANCY: ICD-10-CM

## 2019-12-20 PROCEDURE — 99214 OFFICE O/P EST MOD 30 MIN: CPT | Performed by: FAMILY MEDICINE

## 2019-12-20 PROCEDURE — 93000 ELECTROCARDIOGRAM COMPLETE: CPT | Performed by: FAMILY MEDICINE

## 2019-12-20 PROCEDURE — 36415 COLL VENOUS BLD VENIPUNCTURE: CPT

## 2019-12-20 PROCEDURE — 80048 BASIC METABOLIC PNL TOTAL CA: CPT

## 2020-01-08 ENCOUNTER — TELEPHONE (OUTPATIENT)
Dept: SURGERY | Facility: CLINIC | Age: 85
End: 2020-01-08

## 2020-01-08 DIAGNOSIS — K42.9 UMBILICAL HERNIA WITHOUT OBSTRUCTION OR GANGRENE: Primary | ICD-10-CM

## 2020-01-15 ENCOUNTER — TELEPHONE (OUTPATIENT)
Dept: FAMILY MEDICINE CLINIC | Facility: CLINIC | Age: 85
End: 2020-01-15

## 2020-01-15 NOTE — TELEPHONE ENCOUNTER
Given his COPD and other medical conditions I would like him to come in for a 15-minute update just to make sure nothing else has changed in the interim.   He should not need any additional blood work or other testing, just a brief exam

## 2020-01-15 NOTE — TELEPHONE ENCOUNTER
NEEDS A NEW MEDICAL CLEARANCE HIS SURGERY DATE WITH DR Rupal Doe ON 1/10/2020 WAS CHANGED TO 1- AND HIS MEDICAL CLEARANCE IS ONLY GOOD TILL THE 20TH. HE WANTS TO KNOW IF HE  NEEDS TO COME IN AGAIN OR IF WE CAN JUST SEND ANOTHER CLEARANCE.

## 2020-01-15 NOTE — TELEPHONE ENCOUNTER
Pt advised.     FYI----pt is already scheduled for a Medicare Wellness Exam on 1/27, so I assume we can just use that

## 2020-01-27 ENCOUNTER — LAB ENCOUNTER (OUTPATIENT)
Dept: LAB | Age: 85
End: 2020-01-27
Attending: FAMILY MEDICINE
Payer: MEDICARE

## 2020-01-27 ENCOUNTER — OFFICE VISIT (OUTPATIENT)
Dept: FAMILY MEDICINE CLINIC | Facility: CLINIC | Age: 85
End: 2020-01-27
Payer: MEDICARE

## 2020-01-27 VITALS
RESPIRATION RATE: 12 BRPM | HEART RATE: 56 BPM | OXYGEN SATURATION: 97 % | SYSTOLIC BLOOD PRESSURE: 100 MMHG | BODY MASS INDEX: 28 KG/M2 | TEMPERATURE: 97 F | DIASTOLIC BLOOD PRESSURE: 60 MMHG | WEIGHT: 195 LBS

## 2020-01-27 DIAGNOSIS — E78.00 HYPERCHOLESTEREMIA: ICD-10-CM

## 2020-01-27 DIAGNOSIS — R60.9 DEPENDENT EDEMA: ICD-10-CM

## 2020-01-27 DIAGNOSIS — N18.31 CHRONIC KIDNEY DISEASE (CKD) STAGE G3A/A1, MODERATELY DECREASED GLOMERULAR FILTRATION RATE (GFR) BETWEEN 45-59 ML/MIN/1.73 SQUARE METER AND ALBUMINURIA CREATININE RATIO LESS THAN 30 MG/G (HCC): ICD-10-CM

## 2020-01-27 DIAGNOSIS — I25.10 CORONARY ARTERY DISEASE INVOLVING NATIVE CORONARY ARTERY OF NATIVE HEART WITHOUT ANGINA PECTORIS: ICD-10-CM

## 2020-01-27 DIAGNOSIS — I10 ESSENTIAL HYPERTENSION: ICD-10-CM

## 2020-01-27 DIAGNOSIS — Z00.00 ENCOUNTER FOR ANNUAL HEALTH EXAMINATION: Primary | ICD-10-CM

## 2020-01-27 DIAGNOSIS — N40.0 BENIGN NON-NODULAR PROSTATIC HYPERPLASIA WITHOUT LOWER URINARY TRACT SYMPTOMS: ICD-10-CM

## 2020-01-27 DIAGNOSIS — K42.9 UMBILICAL HERNIA WITHOUT OBSTRUCTION AND WITHOUT GANGRENE: ICD-10-CM

## 2020-01-27 DIAGNOSIS — D50.9 IRON DEFICIENCY ANEMIA, UNSPECIFIED IRON DEFICIENCY ANEMIA TYPE: ICD-10-CM

## 2020-01-27 DIAGNOSIS — J43.2 CENTRILOBULAR EMPHYSEMA (HCC): ICD-10-CM

## 2020-01-27 DIAGNOSIS — E03.9 HYPOTHYROIDISM (ACQUIRED): ICD-10-CM

## 2020-01-27 DIAGNOSIS — I35.0 AORTIC STENOSIS, MILD: ICD-10-CM

## 2020-01-27 DIAGNOSIS — E79.0 ELEVATED URIC ACID IN BLOOD: ICD-10-CM

## 2020-01-27 DIAGNOSIS — Z01.818 PREOP EXAMINATION: ICD-10-CM

## 2020-01-27 DIAGNOSIS — I73.9 PVD (PERIPHERAL VASCULAR DISEASE) (HCC): ICD-10-CM

## 2020-01-27 PROBLEM — N18.32 CHRONIC KIDNEY DISEASE (CKD) STAGE G3B/A1, MODERATELY DECREASED GLOMERULAR FILTRATION RATE (GFR) BETWEEN 30-44 ML/MIN/1.73 SQUARE METER AND ALBUMINURIA CREATININE RATIO LESS THAN 30 MG/G (HCC): Status: ACTIVE | Noted: 2020-01-27

## 2020-01-27 LAB
ANION GAP SERPL CALC-SCNC: 2 MMOL/L (ref 0–18)
BASOPHILS # BLD AUTO: 0.04 X10(3) UL (ref 0–0.2)
BASOPHILS NFR BLD AUTO: 0.7 %
BUN BLD-MCNC: 29 MG/DL (ref 7–18)
BUN/CREAT SERPL: 26.6 (ref 10–20)
CALCIUM BLD-MCNC: 8.3 MG/DL (ref 8.5–10.1)
CHLORIDE SERPL-SCNC: 113 MMOL/L (ref 98–112)
CO2 SERPL-SCNC: 27 MMOL/L (ref 21–32)
CREAT BLD-MCNC: 1.09 MG/DL (ref 0.7–1.3)
DEPRECATED HBV CORE AB SER IA-ACNC: 317.1 NG/ML (ref 30–530)
DEPRECATED RDW RBC AUTO: 51 FL (ref 35.1–46.3)
EOSINOPHIL # BLD AUTO: 0.21 X10(3) UL (ref 0–0.7)
EOSINOPHIL NFR BLD AUTO: 3.5 %
ERYTHROCYTE [DISTWIDTH] IN BLOOD BY AUTOMATED COUNT: 14 % (ref 11–15)
GLUCOSE BLD-MCNC: 90 MG/DL (ref 70–99)
HCT VFR BLD AUTO: 28.3 % (ref 39–53)
HGB BLD-MCNC: 9.4 G/DL (ref 13–17.5)
IMM GRANULOCYTES # BLD AUTO: 0.02 X10(3) UL (ref 0–1)
IMM GRANULOCYTES NFR BLD: 0.3 %
IRON SATURATION: 37 % (ref 20–50)
IRON SERPL-MCNC: 87 UG/DL (ref 65–175)
LYMPHOCYTES # BLD AUTO: 0.97 X10(3) UL (ref 1–4)
LYMPHOCYTES NFR BLD AUTO: 16.1 %
MCH RBC QN AUTO: 33.5 PG (ref 26–34)
MCHC RBC AUTO-ENTMCNC: 33.2 G/DL (ref 31–37)
MCV RBC AUTO: 100.7 FL (ref 80–100)
MONOCYTES # BLD AUTO: 0.6 X10(3) UL (ref 0.1–1)
MONOCYTES NFR BLD AUTO: 10 %
NEUTROPHILS # BLD AUTO: 4.18 X10 (3) UL (ref 1.5–7.7)
NEUTROPHILS # BLD AUTO: 4.18 X10(3) UL (ref 1.5–7.7)
NEUTROPHILS NFR BLD AUTO: 69.4 %
OSMOLALITY SERPL CALC.SUM OF ELEC: 299 MOSM/KG (ref 275–295)
PATIENT FASTING Y/N/NP: NO
PLATELET # BLD AUTO: 179 10(3)UL (ref 150–450)
POTASSIUM SERPL-SCNC: 4.4 MMOL/L (ref 3.5–5.1)
RBC # BLD AUTO: 2.81 X10(6)UL (ref 3.8–5.8)
SODIUM SERPL-SCNC: 142 MMOL/L (ref 136–145)
TOTAL IRON BINDING CAPACITY: 234 UG/DL (ref 240–450)
TRANSFERRIN SERPL-MCNC: 157 MG/DL (ref 200–360)
URATE SERPL-MCNC: 4.3 MG/DL (ref 3.5–7.2)
WBC # BLD AUTO: 6 X10(3) UL (ref 4–11)

## 2020-01-27 PROCEDURE — G0439 PPPS, SUBSEQ VISIT: HCPCS | Performed by: FAMILY MEDICINE

## 2020-01-27 PROCEDURE — 84550 ASSAY OF BLOOD/URIC ACID: CPT

## 2020-01-27 PROCEDURE — 85025 COMPLETE CBC W/AUTO DIFF WBC: CPT

## 2020-01-27 PROCEDURE — 80048 BASIC METABOLIC PNL TOTAL CA: CPT

## 2020-01-27 PROCEDURE — 83540 ASSAY OF IRON: CPT

## 2020-01-27 PROCEDURE — 82728 ASSAY OF FERRITIN: CPT

## 2020-01-27 PROCEDURE — G0444 DEPRESSION SCREEN ANNUAL: HCPCS | Performed by: FAMILY MEDICINE

## 2020-01-27 PROCEDURE — 83550 IRON BINDING TEST: CPT

## 2020-01-27 PROCEDURE — 99213 OFFICE O/P EST LOW 20 MIN: CPT | Performed by: FAMILY MEDICINE

## 2020-01-27 PROCEDURE — 36415 COLL VENOUS BLD VENIPUNCTURE: CPT

## 2020-01-27 RX ORDER — CARVEDILOL 25 MG/1
TABLET ORAL
Refills: 0 | COMMUNITY
Start: 2020-01-27

## 2020-01-27 RX ORDER — FUROSEMIDE 40 MG/1
40 TABLET ORAL
Qty: 5 TABLET | Refills: 0 | COMMUNITY
Start: 2020-01-27 | End: 2020-09-11 | Stop reason: DRUGHIGH

## 2020-01-27 NOTE — PATIENT INSTRUCTIONS
Harrison Gonzalez's SCREENING SCHEDULE   Tests on this list are recommended by your physician but may not be covered, or covered at this frequency, by your insurer. Please check with your insurance carrier before scheduling to verify coverage.     PREVENT following criteria:   • Men who are 73-68 years old and have smoked more than 100 cigarettes in their lifetime   • Anyone with a family history    Colorectal Cancer Screening Covered up to Age 76     Colonoscopy Screen   Covered every 10 years- more often Hemophiliacs who received Factor VIII or IX concentrates   Clients of institutions for the mentally retarded   Persons who live in the same house as a HepB virus carrier   Homosexual men   Illicit injectable drug abusers     Tetanus Toxoid- Only covered 3 days and monitor his edema as well as daily weights. I have asked him to hold his diuretics the morning of the procedure but to continue all other medications    3. Umbilical hernia without obstruction and without gangrene  Surgery as planned    4.  Doriane weight and edema    Johanny Mckeon.  Evaristo Vazquez, FAAFP

## 2020-01-27 NOTE — H&P
Reina Hartman is a 80year old male Patient presents with:  Physical: Medicare AWV  Pre-Op Exam    HPI:   Pt is scheduled for umbilical herniorrhaphy with possible mesh placement on 1/31/2020 at [facility] with Dr. Ebony Bautista.   He is also here 0   • Ascorbic Acid (VITAMIN C) 100 MG Oral Tab Take 100 mg by mouth daily. • Garlic 499 MG Oral Tab Take by mouth. • amLODIPine Besylate 5 MG Oral Tab Take 1 tablet (5 mg total) by mouth daily.  180 tablet 0   • rivaroxaban (XARELTO) 2.5 MG Oral Ta High cholesterol    • Hypothyroidism (acquired)    • Neuropathy     right toe-    • Osteoarthritis    • Postoperative retention of urine    • PVD (peripheral vascular disease) ([facility] [address]       Past Surgical History:   Procedure Laterality Date   • ANGIOPLASTY ( 11/2/1995  Smokeless tobacco: Never Used                      Alcohol use: VS             no advanced directives  Occ: . : +.  Children: 6  Exercise: minimal.  Diet: watches minimally     REVIEW OF SYSTEMS:   GENERAL: generally feels well, well developed, well nourished,in no apparent distress  SKIN: no rashes,no suspicious lesions, scattered seborrheic keratoses throughout the trunk  ENT: EAC:  Clear, TMs: intact, Nose: turbinates normal, septum: midline, discharge: none, Pharynx: +upper de • GFR, Non- 12/20/2019 54* >=60 Final   • GFR, -American 12/20/2019 62  >=60 Final   • FASTING 12/20/2019 Yes   Final     ASSESSMENT AND PLAN:   Zayda Gonzalez is a 80year old male   Encounter for annual health examination  (pr Patient must be diagnosed with one of the following:   • Hypertension   • Dyslipidemia   • Obesity (BMI ³30 kg/m2)   • Previous elevated impaired FBS or GTT   … or any two of the following:   • Overweight (BMI ³25 but <30)   • Family history of diabetes (no units)   Date Value   01/22/2018 Negative for Occult Blood   01/22/2018 Negative for Occult Blood   01/22/2018 Negative for Occult Blood    No flowsheet data found.      Barium Enema-   uncomfortable but covered  Covered but uncomfortable   Glaucoma Scr may be covered with your pharmacy  prescription benefits     Recommended Websites for Advanced Directives    [email or web address] [email or web address] pdf  An information packet, including necessary form from the [facility] 2 current medication and monitor clinically    6. PVD (peripheral vascular disease) ([facility] [address]  Continue current medication, monitor clinically, encourage daily walking program    7.  Chronic kidney disease (CKD) stage G3a/A1, moderately decreased glomerular filtr

## 2020-01-28 DIAGNOSIS — D50.9 IRON DEFICIENCY ANEMIA, UNSPECIFIED IRON DEFICIENCY ANEMIA TYPE: Primary | ICD-10-CM

## 2020-01-30 ENCOUNTER — ANESTHESIA EVENT (OUTPATIENT)
Dept: SURGERY | Facility: HOSPITAL | Age: 85
End: 2020-01-30
Payer: MEDICARE

## 2020-01-31 ENCOUNTER — HOSPITAL ENCOUNTER (OUTPATIENT)
Facility: HOSPITAL | Age: 85
Setting detail: HOSPITAL OUTPATIENT SURGERY
Discharge: HOME | End: 2020-01-31
Attending: SURGERY | Admitting: SURGERY
Payer: MEDICARE

## 2020-01-31 ENCOUNTER — ANESTHESIA (OUTPATIENT)
Dept: SURGERY | Facility: HOSPITAL | Age: 85
End: 2020-01-31
Payer: MEDICARE

## 2020-01-31 VITALS
BODY MASS INDEX: 26.51 KG/M2 | TEMPERATURE: 97 F | RESPIRATION RATE: 18 BRPM | HEART RATE: 59 BPM | SYSTOLIC BLOOD PRESSURE: 161 MMHG | OXYGEN SATURATION: 99 % | HEIGHT: 71 IN | DIASTOLIC BLOOD PRESSURE: 53 MMHG | WEIGHT: 189.38 LBS

## 2020-01-31 DIAGNOSIS — K42.9 UMBILICAL HERNIA WITHOUT OBSTRUCTION AND WITHOUT GANGRENE: ICD-10-CM

## 2020-01-31 PROCEDURE — 0WQF0ZZ REPAIR ABDOMINAL WALL, OPEN APPROACH: ICD-10-PCS | Performed by: SURGERY

## 2020-01-31 RX ORDER — ACETAMINOPHEN 500 MG
1000 TABLET ORAL ONCE
COMMUNITY

## 2020-01-31 RX ORDER — ACETAMINOPHEN 500 MG
1000 TABLET ORAL ONCE
Status: COMPLETED | OUTPATIENT
Start: 2020-01-31 | End: 2020-01-31

## 2020-01-31 RX ORDER — BUPIVACAINE HYDROCHLORIDE AND EPINEPHRINE 5; 5 MG/ML; UG/ML
INJECTION, SOLUTION EPIDURAL; INTRACAUDAL; PERINEURAL AS NEEDED
Status: DISCONTINUED | OUTPATIENT
Start: 2020-01-31 | End: 2020-01-31 | Stop reason: HOSPADM

## 2020-01-31 RX ORDER — METOCLOPRAMIDE HYDROCHLORIDE 5 MG/ML
10 INJECTION INTRAMUSCULAR; INTRAVENOUS AS NEEDED
Status: DISCONTINUED | OUTPATIENT
Start: 2020-01-31 | End: 2020-01-31

## 2020-01-31 RX ORDER — HEPARIN SODIUM 5000 [USP'U]/ML
5000 INJECTION, SOLUTION INTRAVENOUS; SUBCUTANEOUS ONCE
Status: COMPLETED | OUTPATIENT
Start: 2020-01-31 | End: 2020-01-31

## 2020-01-31 RX ORDER — DIPHENHYDRAMINE HYDROCHLORIDE 50 MG/ML
12.5 INJECTION INTRAMUSCULAR; INTRAVENOUS AS NEEDED
Status: DISCONTINUED | OUTPATIENT
Start: 2020-01-31 | End: 2020-01-31

## 2020-01-31 RX ORDER — SODIUM CHLORIDE, SODIUM LACTATE, POTASSIUM CHLORIDE, CALCIUM CHLORIDE 600; 310; 30; 20 MG/100ML; MG/100ML; MG/100ML; MG/100ML
INJECTION, SOLUTION INTRAVENOUS CONTINUOUS
Status: DISCONTINUED | OUTPATIENT
Start: 2020-01-31 | End: 2020-01-31

## 2020-01-31 RX ORDER — HYDROMORPHONE HYDROCHLORIDE 1 MG/ML
0.4 INJECTION, SOLUTION INTRAMUSCULAR; INTRAVENOUS; SUBCUTANEOUS EVERY 5 MIN PRN
Status: DISCONTINUED | OUTPATIENT
Start: 2020-01-31 | End: 2020-01-31

## 2020-01-31 RX ORDER — HYDROCODONE BITARTRATE AND ACETAMINOPHEN 5; 325 MG/1; MG/1
1 TABLET ORAL AS NEEDED
Status: DISCONTINUED | OUTPATIENT
Start: 2020-01-31 | End: 2020-01-31

## 2020-01-31 RX ORDER — HEPARIN SODIUM 5000 [USP'U]/ML
INJECTION, SOLUTION INTRAVENOUS; SUBCUTANEOUS
Status: DISCONTINUED
Start: 2020-01-31 | End: 2020-01-31

## 2020-01-31 RX ORDER — DEXAMETHASONE SODIUM PHOSPHATE 4 MG/ML
VIAL (ML) INJECTION AS NEEDED
Status: DISCONTINUED | OUTPATIENT
Start: 2020-01-31 | End: 2020-01-31 | Stop reason: SURG

## 2020-01-31 RX ORDER — ROCURONIUM BROMIDE 10 MG/ML
INJECTION, SOLUTION INTRAVENOUS AS NEEDED
Status: DISCONTINUED | OUTPATIENT
Start: 2020-01-31 | End: 2020-01-31 | Stop reason: SURG

## 2020-01-31 RX ORDER — CEFAZOLIN SODIUM/WATER 2 G/20 ML
SYRINGE (ML) INTRAVENOUS
Status: DISCONTINUED
Start: 2020-01-31 | End: 2020-01-31

## 2020-01-31 RX ORDER — ONDANSETRON 2 MG/ML
INJECTION INTRAMUSCULAR; INTRAVENOUS AS NEEDED
Status: DISCONTINUED | OUTPATIENT
Start: 2020-01-31 | End: 2020-01-31 | Stop reason: SURG

## 2020-01-31 RX ORDER — ACETAMINOPHEN 500 MG
1000 TABLET ORAL ONCE
Status: DISCONTINUED | OUTPATIENT
Start: 2020-01-31 | End: 2020-01-31

## 2020-01-31 RX ORDER — MEPERIDINE HYDROCHLORIDE 25 MG/ML
12.5 INJECTION INTRAMUSCULAR; INTRAVENOUS; SUBCUTANEOUS AS NEEDED
Status: DISCONTINUED | OUTPATIENT
Start: 2020-01-31 | End: 2020-01-31

## 2020-01-31 RX ORDER — NALOXONE HYDROCHLORIDE 0.4 MG/ML
80 INJECTION, SOLUTION INTRAMUSCULAR; INTRAVENOUS; SUBCUTANEOUS AS NEEDED
Status: DISCONTINUED | OUTPATIENT
Start: 2020-01-31 | End: 2020-01-31

## 2020-01-31 RX ORDER — HYDROCODONE BITARTRATE AND ACETAMINOPHEN 5; 325 MG/1; MG/1
2 TABLET ORAL AS NEEDED
Status: DISCONTINUED | OUTPATIENT
Start: 2020-01-31 | End: 2020-01-31

## 2020-01-31 RX ORDER — LIDOCAINE HYDROCHLORIDE 10 MG/ML
INJECTION, SOLUTION EPIDURAL; INFILTRATION; INTRACAUDAL; PERINEURAL AS NEEDED
Status: DISCONTINUED | OUTPATIENT
Start: 2020-01-31 | End: 2020-01-31 | Stop reason: SURG

## 2020-01-31 RX ORDER — CEFAZOLIN SODIUM/WATER 2 G/20 ML
2 SYRINGE (ML) INTRAVENOUS ONCE
Status: COMPLETED | OUTPATIENT
Start: 2020-01-31 | End: 2020-01-31

## 2020-01-31 RX ORDER — ONDANSETRON 2 MG/ML
4 INJECTION INTRAMUSCULAR; INTRAVENOUS AS NEEDED
Status: DISCONTINUED | OUTPATIENT
Start: 2020-01-31 | End: 2020-01-31

## 2020-01-31 RX ADMIN — ROCURONIUM BROMIDE 50 MG: 10 INJECTION, SOLUTION INTRAVENOUS at 07:39:00

## 2020-01-31 RX ADMIN — CEFAZOLIN SODIUM/WATER 2 G: 2 G/20 ML SYRINGE (ML) INTRAVENOUS at 07:44:00

## 2020-01-31 RX ADMIN — LIDOCAINE HYDROCHLORIDE 50 MG: 10 INJECTION, SOLUTION EPIDURAL; INFILTRATION; INTRACAUDAL; PERINEURAL at 07:39:00

## 2020-01-31 RX ADMIN — ONDANSETRON 4 MG: 2 INJECTION INTRAMUSCULAR; INTRAVENOUS at 07:58:00

## 2020-01-31 RX ADMIN — DEXAMETHASONE SODIUM PHOSPHATE 4 MG: 4 MG/ML VIAL (ML) INJECTION at 07:58:00

## 2020-01-31 RX ADMIN — SODIUM CHLORIDE, SODIUM LACTATE, POTASSIUM CHLORIDE, CALCIUM CHLORIDE: 600; 310; 30; 20 INJECTION, SOLUTION INTRAVENOUS at 08:15:00

## 2020-01-31 NOTE — ANESTHESIA POSTPROCEDURE EVALUATION
[id number] [facility] Patient Status:  Hospital Outpatient Surgery   Age/Gender 80year old male MRN [id number]   [facility] SURGERY Attending Latoya Palacios, [address] Day # 0 PCP Coy Arceo.  Lucas Lizarraga DO       Anesthesia Post-op

## 2020-01-31 NOTE — ANESTHESIA PROCEDURE NOTES
Airway  Date/Time: 1/31/2020 7:41 AM  Urgency: elective    Airway not difficult    General Information and Staff    Patient location during procedure: OR  Anesthesiologist: Miguel Ortega MD  Performed: anesthesiologist     Indications and Patient Co

## 2020-01-31 NOTE — ANESTHESIA PREPROCEDURE EVALUATION
PRE-OP EVALUATION    Patient Name: Dalia Spangler    Pre-op Diagnosis: Umbilical hernia without obstruction and without gangrene [K42.9]    Procedure(s):   UMBILICAL HERNIORRHAPHY POSSIBLE MESH    Surgeon(s) and Role:     Meenu Perry, DO - Primary Oral Tab, Take 2.5 mg by mouth 2 (two) times daily with meals. , Disp: , Rfl:   loratadine 10 MG Oral Tab, Take 10 mg by mouth daily. , Disp: , Rfl:   Omega-3 Fatty Acids (FISH OIL OR), Take by mouth., Disp: , Rfl:   vitamin E 400 UNITS Oral Cap, Take 1 caps History:   Procedure Laterality Date   • ANGIOPLASTY (CORONARY)     • CABG  Aug 13, 2014    At [facility]/ x4 vessels   • CATARACT Bilateral     At [place]   • COLONOSCOPY  2007    [facility]   • COLONOSCOPY N/A 1/10/2017    Performed by Kasia Baez MD at [facility] E Component Value Date     01/27/2020    K 4.4 01/27/2020     (H) 01/27/2020    CO2 27.0 01/27/2020    BUN 29 (H) 01/27/2020    CREATSERUM 1.09 01/27/2020    GLU 90 01/27/2020    CA 8.3 (L) 01/27/2020            Airway      Mallampati: III  Donna

## 2020-01-31 NOTE — OPERATIVE REPORT
[facility]    PATIENT'S NAME: Nelda Acosta   ATTENDING PHYSICIAN: Benita Grimaldo D.O.   OPERATING PHYSICIAN: Benita Grimaldo D.O.   PATIENT ACCOUNT#:   [de-identified]    LOCATION:  [address] [place] [address] [address] #:   [id number] interrupted fashion. The posterior umbilical skin was sutured to the fascia using interrupted 3-0 Vicryl suture and skin edges approximated using 4-0 Monocryl in a running subdermal fashion.   Then, 20 mL of 0.5% Marcaine plain was injected into the incisi

## 2020-01-31 NOTE — H&P
Patient presents with umbilical hernia. Patient states he has noticed it for the last couple of months. He states that he pushes the tissue in and out there is some discomfort when he pushes the tissue in. He denies that he does any heavy lifting.   He d Performed by Dulce Maria Wang MD at [facility] MAIN OR   • SHOULDER ARTHROSCOPY ROTATOR CUFF REPAIR Right 11/10/2015     Performed by Dulce Maria Wang MD at [address] [facility] (DMR=[id number])   07/10/2018     Less than 50% narrowing in bilateral in times daily. , Disp: , Rfl:   Dorzolamide HCl (TRUSOPT) 2 % Ophthalmic Solution, Place 1 drop into both eyes 3 (three) times daily. , Disp: , Rfl:   latanoprost (XALATAN) 0.005 % Ophthalmic Solution, Place 1 drop into both eyes 3 (three) times daily.   , STATUS :Alert, oriented x 3  PSYCH: normal mood and affect  SKIN: anicteric, no rashes, no bruising  EYES: PERRLA, EOMI, sclera anicteric,  conjunctiva without pallor  HEENT: normocephalic, atraumatic, TMs clear, nares patent, mouth moist, pharynx w/o eryt coronary heart disease.         • Triglycerides 09/30/2019 65  30 - 149 mg/dL Final        Reference interval for fasting triglycerides  Desirable: <150 mg/dL  Borderline: 150-199 mg/dL  High: 200-499 mg/dL  Very High: >=500 mg/dL            • LDL Choleste Xarelto in the perioperative.

## 2020-02-07 ENCOUNTER — MED REC SCAN ONLY (OUTPATIENT)
Dept: SURGERY | Facility: CLINIC | Age: 85
End: 2020-02-07

## 2020-02-07 ENCOUNTER — OFFICE VISIT (OUTPATIENT)
Dept: SURGERY | Facility: CLINIC | Age: 85
End: 2020-02-07

## 2020-02-07 VITALS
DIASTOLIC BLOOD PRESSURE: 52 MMHG | SYSTOLIC BLOOD PRESSURE: 132 MMHG | WEIGHT: 190 LBS | BODY MASS INDEX: 27 KG/M2 | TEMPERATURE: 97 F | HEART RATE: 63 BPM

## 2020-02-07 DIAGNOSIS — Z98.890 HISTORY OF UMBILICAL HERNIA REPAIR: Primary | ICD-10-CM

## 2020-02-07 DIAGNOSIS — Z87.19 HISTORY OF UMBILICAL HERNIA REPAIR: Primary | ICD-10-CM

## 2020-02-07 PROCEDURE — 99024 POSTOP FOLLOW-UP VISIT: CPT | Performed by: PHYSICIAN ASSISTANT

## 2020-02-07 NOTE — PROGRESS NOTES
Post Operative Visit Note       Active Problems  1.  History of umbilical hernia repair         Chief Complaint   Patient presents with:  Post-Op: PO 6/76 RWM - UMBILICAL HERNIORRHAPHY          History of Present Illness   The patient presents today for pos STRESS TST, CARDIO ([facility])  03/04/2019    neg perfusion scan, normal   • OTHER      cyst removed from posterior neck   • OTHER      x2 surgeries- ight and left rotator cuff repair   • OTHER SURGICAL HISTORY      left leg angio   • OTHER SURGICAL HISTORY Tab, Take 0.5 tablets (12.5 mg total) by mouth every morning for 90 days, THEN 1 tablet (25 mg total) nightly., Disp: , Rfl: 0  •  HYDROCHLOROTHIAZIDE OR, 25 mg daily. , Disp: , Rfl:   •  ALLOPURINOL 300 MG Oral Tab, TAKE 1 TABLET BY MOUTH ONCE DAILY, Disp: Disp: , Rfl:   •  ASPIRIN 81 MG OR TABS, 1 TABLET DAILY-pt to check with Dr. Fitch Servant regarding taking/holding prior to procedure, Disp: , Rfl:       Review of Systems  The Review of Systems has been reviewed by me during today.   Review of Systems   Constituti mood and affect. His behavior is normal.   Nursing note and vitals reviewed. Assessment   History of umbilical hernia repair  (primary encounter diagnosis)      Plan   · The patient is doing well following umbilical hernia repair.   · He is to cont Statement Selected

## 2020-02-10 RX ORDER — AMLODIPINE BESYLATE 5 MG/1
TABLET ORAL
Qty: 180 TABLET | Refills: 0 | Status: SHIPPED | OUTPATIENT
Start: 2020-02-10 | End: 2020-08-07

## 2020-02-10 NOTE — TELEPHONE ENCOUNTER
Last refill #90 on 8/13/19  Last office visit on 1/27/2020  No future appointments. BP Readings from Last 3 Encounters:  02/07/20 : 132/52  01/31/20 : (!) 161/53  01/27/20 : 100/60    Labs current.   Refilled per protocol

## 2020-02-12 RX ORDER — ALLOPURINOL 300 MG/1
TABLET ORAL
Qty: 90 TABLET | Refills: 3 | Status: SHIPPED | OUTPATIENT
Start: 2020-02-12 | End: 2021-02-10

## 2020-02-12 NOTE — TELEPHONE ENCOUNTER
LOV  1/27/2020    LAST LAB  1/27/2020    LAST RX  11/18/19  (90 tabs, 0 refill)    Next OV  No future appointments.     PROTOCOL  none

## 2020-06-11 ENCOUNTER — TELEPHONE (OUTPATIENT)
Dept: FAMILY MEDICINE CLINIC | Facility: CLINIC | Age: 85
End: 2020-06-11

## 2020-06-11 NOTE — TELEPHONE ENCOUNTER
Spoke with pt. C/o SOB x1wk, mostly with exertion---walking 100ft and back. Happens daily---has burning in chest with it. Occasional cough, but feels it's his normal cough. No fever, no cold sx.   Reports he was in [facility] last week for a fun

## 2020-06-11 NOTE — TELEPHONE ENCOUNTER
SOB WHEN HE EXERTS HIMSELF, HAS BEEN GOING ON FOR A FEW DAYS, CONCERNED ABOUT COVID, HAS NOT BEEN AROUND ANYONE THAT HAD IT & NO OTHER SYMPTOMS

## 2020-06-12 ENCOUNTER — TELEMEDICINE (OUTPATIENT)
Dept: FAMILY MEDICINE CLINIC | Facility: CLINIC | Age: 85
End: 2020-06-12
Payer: MEDICARE

## 2020-06-12 ENCOUNTER — TELEPHONE (OUTPATIENT)
Dept: FAMILY MEDICINE CLINIC | Facility: CLINIC | Age: 85
End: 2020-06-12

## 2020-06-12 VITALS
SYSTOLIC BLOOD PRESSURE: 118 MMHG | DIASTOLIC BLOOD PRESSURE: 45 MMHG | WEIGHT: 191 LBS | HEART RATE: 60 BPM | BODY MASS INDEX: 27 KG/M2

## 2020-06-12 DIAGNOSIS — I25.10 CORONARY ARTERY DISEASE INVOLVING NATIVE CORONARY ARTERY OF NATIVE HEART WITHOUT ANGINA PECTORIS: ICD-10-CM

## 2020-06-12 DIAGNOSIS — I95.2 HYPOTENSION DUE TO DRUGS: ICD-10-CM

## 2020-06-12 DIAGNOSIS — I20.8 STABLE ANGINA (HCC): Primary | ICD-10-CM

## 2020-06-12 DIAGNOSIS — J43.2 CENTRILOBULAR EMPHYSEMA (HCC): ICD-10-CM

## 2020-06-12 DIAGNOSIS — I10 ESSENTIAL HYPERTENSION: ICD-10-CM

## 2020-06-12 PROCEDURE — 99214 OFFICE O/P EST MOD 30 MIN: CPT | Performed by: FAMILY MEDICINE

## 2020-06-12 RX ORDER — ALBUTEROL SULFATE 90 UG/1
2 AEROSOL, METERED RESPIRATORY (INHALATION) EVERY 4 HOURS PRN
Qty: 1 INHALER | Refills: 0 | Status: SHIPPED | OUTPATIENT
Start: 2020-06-12

## 2020-06-12 RX ORDER — NITROGLYCERIN 0.4 MG/1
0.4 TABLET SUBLINGUAL EVERY 5 MIN PRN
Qty: 50 TABLET | Refills: 0 | Status: SHIPPED | OUTPATIENT
Start: 2020-06-12

## 2020-06-12 NOTE — TELEPHONE ENCOUNTER
Phone call returned from [facility] cardiology. I talked with Dr. Manoj Hebert triage nurse, Rhonda. They have already been in contact with the patient and Dr. Lennie Lamar is being paged.

## 2020-06-12 NOTE — PROGRESS NOTES
HPI:    Patient ID: Smooth Locke is a 80year old male.     Patient presents with:  Chest Pain: SOB      [facility]  Telehealth Verbal Consent   I conducted a telehealth visit with Smooth Locke today, 06/12/20, which was complet consistent with exertion. He denies any fever, chills, myalgias, change in sense of taste or smell, cough etc.  Patient states he has had 2 dizzy spells while driving that required him to pull over and allow his wife to drive.   He states at that time his • HERNIA SURGERY  25/90/4464     UMBILICAL HERNIORRHAPHY    • HERNIA UMBILICAL REPAIR ADULT N/A 1/31/2020    Performed by Jn OfficerDO at [facility] NUC STRESS TST, CARDIO ([facility])  03/04/2019    neg perfusion scan, normal   • OTHER tablet (40 mg total) by mouth daily as needed (for wt gain). 5 tablet 0   • carvedilol 25 MG Oral Tab Take 0.5 tablets (12.5 mg total) by mouth every morning for 90 days, THEN 1 tablet (25 mg total) nightly. 0   • HYDROCHLOROTHIAZIDE OR 25 mg daily.      • distress. Neurological: He is alert and oriented to person, place, and time. Psychiatric: He has a normal mood and affect. Blood pressure 118/45, pulse 60, weight 191 lb (86.6 kg).          ASSESSMENT/PLAN:   Stable angina (hcc)  (primary encounter d

## 2020-06-12 NOTE — PATIENT INSTRUCTIONS
I advised patient that I am concerned that his symptoms are related to angina. I reviewed signs and symptoms of COVID-19 infection and feel that the symptoms are not related to coronavirus infection rather coronary artery disease.   I asked patient to cont

## 2020-06-12 NOTE — TELEPHONE ENCOUNTER
Called Dr. Phu Ni office, she is not in today.      Spoke with nurse--transferred call to Dr. Wilber Shah

## 2020-06-25 ENCOUNTER — TELEPHONE (OUTPATIENT)
Dept: FAMILY MEDICINE CLINIC | Facility: CLINIC | Age: 85
End: 2020-06-25

## 2020-06-29 ENCOUNTER — TELEPHONE (OUTPATIENT)
Dept: FAMILY MEDICINE CLINIC | Facility: CLINIC | Age: 85
End: 2020-06-29

## 2020-06-29 NOTE — TELEPHONE ENCOUNTER
WAS THE COLONOSCOPY REPORT FAXED TO DR Marija Parker @  [phone]   HE WANTS A CALL TO LET HIM KNOW

## 2020-07-20 ENCOUNTER — TELEPHONE (OUTPATIENT)
Dept: FAMILY MEDICINE CLINIC | Facility: CLINIC | Age: 85
End: 2020-07-20

## 2020-07-20 DIAGNOSIS — E79.0 ELEVATED URIC ACID IN BLOOD: ICD-10-CM

## 2020-07-20 DIAGNOSIS — E03.9 HYPOTHYROIDISM (ACQUIRED): ICD-10-CM

## 2020-07-20 DIAGNOSIS — I10 ESSENTIAL HYPERTENSION: Primary | ICD-10-CM

## 2020-07-20 DIAGNOSIS — E78.00 HYPERCHOLESTEREMIA: ICD-10-CM

## 2020-07-20 DIAGNOSIS — D50.9 IRON DEFICIENCY ANEMIA, UNSPECIFIED IRON DEFICIENCY ANEMIA TYPE: ICD-10-CM

## 2020-07-20 DIAGNOSIS — Z79.899 ENCOUNTER FOR LONG-TERM (CURRENT) DRUG USE: ICD-10-CM

## 2020-07-20 NOTE — TELEPHONE ENCOUNTER
See below. Pt is due for CMP and lipids. 1. He has a CBC, ferritin, Fe and TIBC done on 1/27/20---was told to add a multivitamin w/ iron and recheck Hgb 6-8 wks later. He never had that done. Do you want a full CBC now? Of just the Hgb? 2.  Had a

## 2020-07-20 NOTE — TELEPHONE ENCOUNTER
NEEDS LAB ORDERS FOR DR Rosamaria Lui TO BE SENT TO [facility].        HAS APPT ON Wednesday AT [place] [address] PATIENT LAB       AS HE NEEDS TO HAVE LABS  AND RESULTS IN TO [facility]  BY Friday

## 2020-08-07 RX ORDER — AMLODIPINE BESYLATE 5 MG/1
5 TABLET ORAL 2 TIMES DAILY
Qty: 60 TABLET | Refills: 0 | Status: SHIPPED | OUTPATIENT
Start: 2020-08-07 | End: 2020-09-05

## 2020-08-07 NOTE — TELEPHONE ENCOUNTER
Last refill #180 on 2/10/2020  Last office visit on 6/12/2020 -  Virtual visit  No future appointments. Patient is due for blood work. Reminder letter sent.  #60 REFILLED

## 2020-09-03 ENCOUNTER — TELEPHONE (OUTPATIENT)
Dept: FAMILY MEDICINE CLINIC | Facility: CLINIC | Age: 85
End: 2020-09-03

## 2020-09-04 NOTE — TELEPHONE ENCOUNTER
[facility] [place] did not draw our labs. Will let pt know so he can make an appointment.     Appt made       Future Appointments   Date Time Provider Wade Lawrence   9/9/2020  9:15 AM REF [facility] SW [facility]AC REF EMGSFP Ref Lab [facility]

## 2020-09-04 NOTE — TELEPHONE ENCOUNTER
Pt was wondering why does he have to get his labs done at [facility]. I explained to pt that there were lab orders faxed to [facility] per his request on 7/20. Pt wasn't sure if they had drawn the labs we ordered along with other orders.  Advised I will call [facility]

## 2020-09-05 RX ORDER — AMLODIPINE BESYLATE 5 MG/1
5 TABLET ORAL 2 TIMES DAILY
Qty: 180 TABLET | Refills: 0 | Status: SHIPPED | OUTPATIENT
Start: 2020-09-05 | End: 2020-12-07

## 2020-09-05 NOTE — TELEPHONE ENCOUNTER
Last OV: 6/12/20 (telemed)  Last labs: 1/27/20    Future Appointments   Date Time Provider Wade Lawrence   9/9/2020  9:15 AM REF [facility] SW FAM PRAC REF EMGSFP Ref Lab [place]     Made lab appointment yesterday

## 2020-09-09 ENCOUNTER — LABORATORY ENCOUNTER (OUTPATIENT)
Dept: LAB | Age: 85
End: 2020-09-09
Attending: FAMILY MEDICINE
Payer: MEDICARE

## 2020-09-09 DIAGNOSIS — E78.00 HYPERCHOLESTEREMIA: ICD-10-CM

## 2020-09-09 DIAGNOSIS — E03.9 HYPOTHYROIDISM (ACQUIRED): ICD-10-CM

## 2020-09-09 DIAGNOSIS — D64.9 LOW HEMOGLOBIN: ICD-10-CM

## 2020-09-09 DIAGNOSIS — Z79.899 ENCOUNTER FOR LONG-TERM (CURRENT) DRUG USE: ICD-10-CM

## 2020-09-09 DIAGNOSIS — I10 ESSENTIAL HYPERTENSION: ICD-10-CM

## 2020-09-09 DIAGNOSIS — D50.9 IRON DEFICIENCY ANEMIA, UNSPECIFIED IRON DEFICIENCY ANEMIA TYPE: ICD-10-CM

## 2020-09-09 LAB
ALBUMIN SERPL-MCNC: 3.7 G/DL (ref 3.4–5)
ALBUMIN/GLOB SERPL: 1.4 {RATIO} (ref 1–2)
ALP LIVER SERPL-CCNC: 77 U/L (ref 45–117)
ALT SERPL-CCNC: 25 U/L (ref 16–61)
ANION GAP SERPL CALC-SCNC: 5 MMOL/L (ref 0–18)
AST SERPL-CCNC: 20 U/L (ref 15–37)
BASOPHILS # BLD AUTO: 0.03 X10(3) UL (ref 0–0.2)
BASOPHILS NFR BLD AUTO: 0.6 %
BILIRUB SERPL-MCNC: 0.6 MG/DL (ref 0.1–2)
BUN BLD-MCNC: 38 MG/DL (ref 7–18)
BUN/CREAT SERPL: 23.2 (ref 10–20)
CALCIUM BLD-MCNC: 8.4 MG/DL (ref 8.5–10.1)
CHLORIDE SERPL-SCNC: 113 MMOL/L (ref 98–112)
CHOLEST SMN-MCNC: 132 MG/DL (ref ?–200)
CO2 SERPL-SCNC: 22 MMOL/L (ref 21–32)
CREAT BLD-MCNC: 1.64 MG/DL (ref 0.7–1.3)
DEPRECATED RDW RBC AUTO: 59.8 FL (ref 35.1–46.3)
EOSINOPHIL # BLD AUTO: 0.24 X10(3) UL (ref 0–0.7)
EOSINOPHIL NFR BLD AUTO: 5 %
ERYTHROCYTE [DISTWIDTH] IN BLOOD BY AUTOMATED COUNT: 15.8 % (ref 11–15)
GLOBULIN PLAS-MCNC: 2.6 G/DL (ref 2.8–4.4)
GLUCOSE BLD-MCNC: 96 MG/DL (ref 70–99)
HCT VFR BLD AUTO: 28.2 % (ref 39–53)
HDLC SERPL-MCNC: 52 MG/DL (ref 40–59)
HGB BLD-MCNC: 8.6 G/DL (ref 13–17.5)
IMM GRANULOCYTES # BLD AUTO: 0.01 X10(3) UL (ref 0–1)
IMM GRANULOCYTES NFR BLD: 0.2 %
LDLC SERPL CALC-MCNC: 67 MG/DL (ref ?–100)
LYMPHOCYTES # BLD AUTO: 0.83 X10(3) UL (ref 1–4)
LYMPHOCYTES NFR BLD AUTO: 17.4 %
M PROTEIN MFR SERPL ELPH: 6.3 G/DL (ref 6.4–8.2)
MCH RBC QN AUTO: 31.3 PG (ref 26–34)
MCHC RBC AUTO-ENTMCNC: 30.5 G/DL (ref 31–37)
MCV RBC AUTO: 102.5 FL (ref 80–100)
MONOCYTES # BLD AUTO: 0.34 X10(3) UL (ref 0.1–1)
MONOCYTES NFR BLD AUTO: 7.1 %
NEUTROPHILS # BLD AUTO: 3.33 X10 (3) UL (ref 1.5–7.7)
NEUTROPHILS # BLD AUTO: 3.33 X10(3) UL (ref 1.5–7.7)
NEUTROPHILS NFR BLD AUTO: 69.7 %
NONHDLC SERPL-MCNC: 80 MG/DL (ref ?–130)
OSMOLALITY SERPL CALC.SUM OF ELEC: 299 MOSM/KG (ref 275–295)
PATIENT FASTING Y/N/NP: YES
PATIENT FASTING Y/N/NP: YES
PLATELET # BLD AUTO: 164 10(3)UL (ref 150–450)
POTASSIUM SERPL-SCNC: 5 MMOL/L (ref 3.5–5.1)
RBC # BLD AUTO: 2.75 X10(6)UL (ref 3.8–5.8)
SODIUM SERPL-SCNC: 140 MMOL/L (ref 136–145)
T4 FREE SERPL-MCNC: 1.3 NG/DL (ref 0.8–1.7)
TRIGL SERPL-MCNC: 63 MG/DL (ref 30–149)
TSI SER-ACNC: 3.93 MIU/ML (ref 0.36–3.74)
VLDLC SERPL CALC-MCNC: 13 MG/DL (ref 0–30)
WBC # BLD AUTO: 4.8 X10(3) UL (ref 4–11)

## 2020-09-09 PROCEDURE — 82607 VITAMIN B-12: CPT

## 2020-09-09 PROCEDURE — 84439 ASSAY OF FREE THYROXINE: CPT

## 2020-09-09 PROCEDURE — 84443 ASSAY THYROID STIM HORMONE: CPT

## 2020-09-09 PROCEDURE — 36415 COLL VENOUS BLD VENIPUNCTURE: CPT

## 2020-09-09 PROCEDURE — 82728 ASSAY OF FERRITIN: CPT

## 2020-09-09 PROCEDURE — 80053 COMPREHEN METABOLIC PANEL: CPT

## 2020-09-09 PROCEDURE — 85025 COMPLETE CBC W/AUTO DIFF WBC: CPT

## 2020-09-09 PROCEDURE — 80061 LIPID PANEL: CPT

## 2020-09-10 DIAGNOSIS — D64.9 LOW HEMOGLOBIN: Primary | ICD-10-CM

## 2020-09-10 LAB
DEPRECATED HBV CORE AB SER IA-ACNC: 174.8 NG/ML (ref 30–530)
VIT B12 SERPL-MCNC: 545 PG/ML (ref 193–986)

## 2020-09-11 DIAGNOSIS — E78.00 HYPERCHOLESTEREMIA: ICD-10-CM

## 2020-09-11 DIAGNOSIS — N28.9 FUNCTION KIDNEY DECREASED: Primary | ICD-10-CM

## 2020-09-11 RX ORDER — FUROSEMIDE 20 MG/1
20 TABLET ORAL DAILY
Qty: 30 TABLET | Refills: 1 | Status: SHIPPED | OUTPATIENT
Start: 2020-09-11 | End: 2021-02-16

## 2020-09-15 ENCOUNTER — TELEPHONE (OUTPATIENT)
Dept: FAMILY MEDICINE CLINIC | Facility: CLINIC | Age: 85
End: 2020-09-15

## 2020-09-15 ENCOUNTER — OFFICE VISIT (OUTPATIENT)
Dept: FAMILY MEDICINE CLINIC | Facility: CLINIC | Age: 85
End: 2020-09-15
Payer: MEDICARE

## 2020-09-15 ENCOUNTER — HOSPITAL ENCOUNTER (OUTPATIENT)
Dept: GENERAL RADIOLOGY | Age: 85
Discharge: HOME | End: 2020-09-15
Attending: FAMILY MEDICINE
Payer: MEDICARE

## 2020-09-15 VITALS
TEMPERATURE: 98 F | SYSTOLIC BLOOD PRESSURE: 116 MMHG | OXYGEN SATURATION: 95 % | HEIGHT: 71 IN | WEIGHT: 203 LBS | DIASTOLIC BLOOD PRESSURE: 50 MMHG | BODY MASS INDEX: 28.42 KG/M2 | HEART RATE: 64 BPM

## 2020-09-15 DIAGNOSIS — D64.9 ANEMIA, UNSPECIFIED TYPE: ICD-10-CM

## 2020-09-15 DIAGNOSIS — I35.0 AORTIC STENOSIS, MILD: ICD-10-CM

## 2020-09-15 DIAGNOSIS — R60.9 DEPENDENT EDEMA: ICD-10-CM

## 2020-09-15 DIAGNOSIS — R06.02 SHORT OF BREATH ON EXERTION: Primary | ICD-10-CM

## 2020-09-15 DIAGNOSIS — R06.02 SHORT OF BREATH ON EXERTION: ICD-10-CM

## 2020-09-15 DIAGNOSIS — I25.10 CORONARY ARTERY DISEASE INVOLVING NATIVE CORONARY ARTERY OF NATIVE HEART WITHOUT ANGINA PECTORIS: ICD-10-CM

## 2020-09-15 DIAGNOSIS — J43.2 CENTRILOBULAR EMPHYSEMA (HCC): ICD-10-CM

## 2020-09-15 PROCEDURE — 99214 OFFICE O/P EST MOD 30 MIN: CPT | Performed by: FAMILY MEDICINE

## 2020-09-15 PROCEDURE — 71046 X-RAY EXAM CHEST 2 VIEWS: CPT | Performed by: FAMILY MEDICINE

## 2020-09-15 RX ORDER — BUDESONIDE AND FORMOTEROL FUMARATE DIHYDRATE 160; 4.5 UG/1; UG/1
1 AEROSOL RESPIRATORY (INHALATION) 2 TIMES DAILY
Qty: 1 INHALER | Refills: 0 | COMMUNITY
Start: 2020-09-15 | End: 2021-10-21 | Stop reason: ALTCHOICE

## 2020-09-15 RX ORDER — HYDROCHLOROTHIAZIDE 25 MG/1
25 TABLET ORAL DAILY
Qty: 30 TABLET | Refills: 1 | Status: SHIPPED | OUTPATIENT
Start: 2020-09-15 | End: 2020-11-20

## 2020-09-15 NOTE — PATIENT INSTRUCTIONS
I reviewed the results of most recent labs with patient as well as bleeding scan and labs from [facility]. I reviewed x-ray results  Advise patient to resume hydrochlorothiazide 25 mg daily along with furosemide 20 mg daily.   Patient given a sample of Sy

## 2020-09-15 NOTE — TELEPHONE ENCOUNTER
I Scheduled pt. For OV in the office today at 3 pm for retaining fluid in legs but when asking the TS ? He did say he does get SOB with exertion also mentioned some congestion.  Not sure if DG would like to keep in office or switch to virtual. I did tell pt

## 2020-09-15 NOTE — PROGRESS NOTES
HPI:    Patient ID: Kate Beltran is a 80year old male. Patient presents with:  Edema: both legs.  x 2-3 weeks, right one worse    Is up 13 pounds since last visit, gradually increase  Legs swollen R>L x several weeks, better in am, worse by end of Hafsa Lynn DO at [facility] MAIN OR   • [address] TST, CARDIO ([facility])  03/04/2019    neg perfusion scan, normal   • OTHER      cyst removed from posterior neck   • OTHER      x2 surgeries- ight and left rotator cuff repair   • OTHER SURGICAL HISTORY needed for Wheezing or Shortness of Breath. 1 Inhaler 0   • nitroGLYCERIN 0.4 MG Sublingual SL Tab Place 1 tablet (0.4 mg total) under the tongue every 5 (five) minutes as needed for Chest pain.  50 tablet 0   • ALLOPURINOL 300 MG Oral Tab TAKE 1 TABLET BY procedure       Allergies:No Known Allergies   PHYSICAL EXAM:   Physical Exam   Constitutional: He is oriented to person, place, and time. Eyes: Conjunctivae are normal.   Neck: Normal range of motion. Neck supple. JVD present.    Cardiovascular: Normal r patients who may be on biotin supplementation to stop biotin consumption at least 72 hours prior to collection of a new sample.      • TSH 09/09/2020 3.930* 0.358 - 3.740 mIU/mL Final    This test may exhibit interference when a sample is collected from a p • Chloride 09/09/2020 113* 98 - 112 mmol/L Final   • CO2 09/09/2020 22.0  21.0 - 32.0 mmol/L Final   • Anion Gap 09/09/2020 5  0 - 18 mmol/L Final   • BUN 09/09/2020 38* 7 - 18 mg/dL Final   • Creatinine 09/09/2020 1.64* 0.70 - 1.30 mg/dL Final   • BUN/C Final   • Neutrophil % 09/09/2020 69.7  % Final   • Lymphocyte % 09/09/2020 17.4  % Final   • Monocyte % 09/09/2020 7.1  % Final   • Eosinophil % 09/09/2020 5.0  % Final   • Basophil % 09/09/2020 0.6  % Final   • Immature Granulocyte % 09/09/2020 0.2  % Fi emphysema (hcc)  Coronary artery disease involving native coronary artery of native heart without angina pectoris  Aortic stenosis, mild, w/ calcified aortic valve  Patient Instructions   I reviewed the results of most recent labs with patient as well as b

## 2020-09-22 ENCOUNTER — TELEPHONE (OUTPATIENT)
Dept: FAMILY MEDICINE CLINIC | Facility: CLINIC | Age: 85
End: 2020-09-22

## 2020-09-22 NOTE — TELEPHONE ENCOUNTER
Continue hydrochlorothiazide 25 mg daily and bump the furosemide up to 40 mg daily for the next 5 to 7 days to see if this improves the shortness of breath and leg edema.   Please send me an update at the end of that time

## 2020-09-22 NOTE — TELEPHONE ENCOUNTER
Pt calls with an update on his BLE edema after his 9/15 OV. Has been taking HCTZ 25mg and furosemide 20mg daily---reports only minimal improvement (<20%). States his weight goes down 1-2#, then up 1-2#----overall, it has remained the same.    States he s

## 2020-11-18 NOTE — TELEPHONE ENCOUNTER
Opal Jane is calling to see when Joaquin's last colonoscopy was done, please call with the date thank you
Per Epic 1/10/2017    Advised Pt of dates.   He states that he has an Upper GI done last week through [facility] and they wanted his last colonoscopy report    Pt will call or send My Chart message with Fax # at new GI's office
See next phone note
No adenopathy or splenomegaly. No cervical or inguinal lymphadenopathy.

## 2020-11-20 RX ORDER — HYDROCHLOROTHIAZIDE 25 MG/1
TABLET ORAL
Qty: 90 TABLET | Refills: 0 | Status: SHIPPED | OUTPATIENT
Start: 2020-11-20 | End: 2021-02-16

## 2020-11-20 NOTE — TELEPHONE ENCOUNTER
Last refill: 9/15/20 #30 w/ 1 refill    Last OV: 9/15/20  Last labs: 9/9/20    No future appointments.         Hypertension Medications Protocol Passed

## 2020-12-07 RX ORDER — AMLODIPINE BESYLATE 5 MG/1
TABLET ORAL
Qty: 180 TABLET | Refills: 0 | Status: SHIPPED | OUTPATIENT
Start: 2020-12-07 | End: 2021-06-03

## 2020-12-07 NOTE — TELEPHONE ENCOUNTER
Last refill #180 on 9/5/2020  Last office visit on 9/15/73980  No future appointments.   BP Readings from Last 3 Encounters:  09/15/20 : 116/50  06/12/20 : 118/45  02/07/20 : 132/52    Labs current until 3/2021  Refilled protocol

## 2020-12-10 ENCOUNTER — PATIENT OUTREACH (OUTPATIENT)
Dept: FAMILY MEDICINE CLINIC | Facility: CLINIC | Age: 85
End: 2020-12-10

## 2020-12-22 ENCOUNTER — OFFICE VISIT (OUTPATIENT)
Dept: DERMATOLOGY | Age: 85
End: 2020-12-22

## 2020-12-22 DIAGNOSIS — L30.9 DERMATITIS: Primary | ICD-10-CM

## 2020-12-22 PROCEDURE — 96372 THER/PROPH/DIAG INJ SC/IM: CPT | Performed by: DERMATOLOGY

## 2020-12-22 PROCEDURE — 99213 OFFICE O/P EST LOW 20 MIN: CPT | Performed by: DERMATOLOGY

## 2020-12-22 RX ORDER — TRIAMCINOLONE ACETONIDE 40 MG/ML
60 INJECTION, SUSPENSION INTRA-ARTICULAR; INTRAMUSCULAR
Status: SHIPPED | OUTPATIENT
Start: 2020-12-22 | End: 2022-12-12

## 2020-12-22 RX ADMIN — TRIAMCINOLONE ACETONIDE 60 MG: 40 INJECTION, SUSPENSION INTRA-ARTICULAR; INTRAMUSCULAR at 16:09

## 2020-12-22 NOTE — PATIENT INSTRUCTIONS
Decrease furosemide 20 mg every morning, continue amlodipine 5 mg daily  Elevate legs at rest, monitor sodium in diet, patient may benefit from knee-high compression hose  We will check lites, BUN, creatinine, magnesium today  I have asked patient to conta Skip synthroid on sundays  Start cytomel 5 mg on 6 days of the week, with your synthroid.      · Take the thyroid medication daily on an empty stomach at least one hour before or four hours after a meal.   · Additionally, be sure that vitamins, calcium tablets, antacids such as TUMS, Zantac (ranitidine), Prilosec (omeprazole), iron tablets and stool softeners are at least 4 hours away.  · Do labs in a month  · Recommend 1,000-2,000 units daily of Vitamin D3  · You could consider taking biotin (for hair/skin/nail) but you have to skip it for 3-5 days before you do any blood tests. It makes your thyroid tests look falsely abnormal  · Consider gluten free diet, rich in fruits and vegetables    Mediterranean diet        Mediterranean diet - A Mediterranean diet has been shown to improve glycemic control when compared with control diets (low fat, nonrestricted calorie, low carbohydrate, high carbohydrate, usual diet) [41].  As an example, in a four-year trial of a low-carbohydrate Mediterranean-style diet (?50 percent complex carbohydrates, ?30 percent mono- and polyunsaturated fat) versus a low-fat (<30 percent) diet in 215 overweight patients with newly-diagnosed type 2 diabetes, patients randomly assigned to the Mediterranean diet were less likely to require antihyperglycemic drugs (44 versus 70 percent) [42]. After one year, weight loss was greater in patients assigned to the Mediterranean diet (absolute difference 2 kg). However, there was no difference in weight loss between the two groups at years 3 and 4. Throughout the four years of the trial, the increase in HDL and decrease in triglycerides were significantly greater in the Mediterranean diet group. Following completion of the trial, all participants were invited to enter a previously unplanned monitoring program, which consisted of twice-yearly visits as participants continued to follow the assigned diet. At the end of eight years, the Mediterranean diet  delayed the time to initiation of glucose-lowering medication (median 4.8 versus 2.8 years with the low-fat diet) [43].  The Mediterranean diet has also been shown to reduce the incidence of major cardiovascular events in patients at high risk for CVD, including those with diabetes    According to the world health organization, people living with chronic conditions (hypertension, type 2 diabetes etc)    • should do at least 150-300 minutes of moderate-intensity aerobic physical activity;   • or at least  minutes of vigorous-intensity aerobic physical activity; or an equivalent combination of moderate- and vigorous-intensity activity throughout the week  • should also do muscle-strengthening activities at moderate or greater intensity that involve all major muscle groups on 2 or more days a week, as these provide additional health benefits.  • As part of their weekly physical activity, older adults should do varied multicomponent physical activity that emphasizes functional balance and strength training at moderate or greater intensity, on 3 or more days a week, to enhance functional capacity and to prevent falls.   • may increase moderate-intensity aerobic physical activity to more than 300 minutes; or do more than 150 minutes of vigorous-intensity aerobic physical activity; or an equivalent combination of moderate- and vigorous-intensity activity throughout the week for additional health benefits.  • should limit the amount of time spent being sedentary. Replacing sedentary time with physical activity of any intensity (including light intensity) provides health benefits, and  • to help reduce the detrimental effects of high levels of sedentary behaviour on health, all adults and older adults should aim to do more than the recommended levels of moderate- to vigorous-intensity physical activity.    Key facts  Physical activity has significant health benefits for hearts, bodies and minds  Physical activity  contributes to preventing and managing noncommunicable diseases such as cardiovascular diseases, cancer and diabetes  Physical activity reduces symptoms of depression and anxiety  Physical activity enhances thinking, learning, and judgment skills  Physical activity ensures healthy growth and development in young people  Physical activity improves overall well-being  Globally, 1 in 4 adults do not meet the global recommended levels of physical activity  Up to 5 million deaths a year could be averted if the global population was more active  People who are insufficiently active have a 20% to 30% increased risk of death compared to people who are sufficiently active  More than 80% of the world's adolescent population is insufficiently physically active

## 2020-12-23 ENCOUNTER — APPOINTMENT (OUTPATIENT)
Dept: DERMATOLOGY | Age: 85
End: 2020-12-23

## 2021-01-11 ENCOUNTER — OFFICE VISIT (OUTPATIENT)
Dept: FAMILY MEDICINE CLINIC | Facility: CLINIC | Age: 86
End: 2021-01-11
Payer: MEDICARE

## 2021-01-11 VITALS
SYSTOLIC BLOOD PRESSURE: 126 MMHG | TEMPERATURE: 98 F | BODY MASS INDEX: 26 KG/M2 | OXYGEN SATURATION: 99 % | HEART RATE: 59 BPM | DIASTOLIC BLOOD PRESSURE: 62 MMHG | WEIGHT: 189 LBS

## 2021-01-11 DIAGNOSIS — H40.9 GLAUCOMA OF BOTH EYES, UNSPECIFIED GLAUCOMA TYPE: ICD-10-CM

## 2021-01-11 DIAGNOSIS — I10 ESSENTIAL HYPERTENSION: ICD-10-CM

## 2021-01-11 DIAGNOSIS — D63.1 ANEMIA DUE TO STAGE 3B CHRONIC KIDNEY DISEASE (HCC): ICD-10-CM

## 2021-01-11 DIAGNOSIS — E03.9 HYPOTHYROIDISM (ACQUIRED): ICD-10-CM

## 2021-01-11 DIAGNOSIS — E79.0 HYPERURICEMIA: ICD-10-CM

## 2021-01-11 DIAGNOSIS — I73.9 PVD (PERIPHERAL VASCULAR DISEASE) (HCC): ICD-10-CM

## 2021-01-11 DIAGNOSIS — N40.0 BENIGN NON-NODULAR PROSTATIC HYPERPLASIA WITHOUT LOWER URINARY TRACT SYMPTOMS: ICD-10-CM

## 2021-01-11 DIAGNOSIS — E78.00 HYPERCHOLESTEREMIA: ICD-10-CM

## 2021-01-11 DIAGNOSIS — I25.10 CORONARY ARTERY DISEASE INVOLVING NATIVE CORONARY ARTERY OF NATIVE HEART WITHOUT ANGINA PECTORIS: ICD-10-CM

## 2021-01-11 DIAGNOSIS — Z00.00 ENCOUNTER FOR ANNUAL HEALTH EXAMINATION: Primary | ICD-10-CM

## 2021-01-11 DIAGNOSIS — N18.32 CHRONIC KIDNEY DISEASE (CKD) STAGE G3B/A1, MODERATELY DECREASED GLOMERULAR FILTRATION RATE (GFR) BETWEEN 30-44 ML/MIN/1.73 SQUARE METER AND ALBUMINURIA CREATININE RATIO LESS THAN 30 MG/G (HCC): ICD-10-CM

## 2021-01-11 DIAGNOSIS — N18.32 ANEMIA DUE TO STAGE 3B CHRONIC KIDNEY DISEASE (HCC): ICD-10-CM

## 2021-01-11 DIAGNOSIS — I87.2 VENOUS INSUFFICIENCY OF BOTH LOWER EXTREMITIES: ICD-10-CM

## 2021-01-11 DIAGNOSIS — I35.0 AORTIC STENOSIS, MILD: ICD-10-CM

## 2021-01-11 DIAGNOSIS — J43.2 CENTRILOBULAR EMPHYSEMA (HCC): ICD-10-CM

## 2021-01-11 PROCEDURE — G0439 PPPS, SUBSEQ VISIT: HCPCS | Performed by: FAMILY MEDICINE

## 2021-01-11 PROCEDURE — G0444 DEPRESSION SCREEN ANNUAL: HCPCS | Performed by: FAMILY MEDICINE

## 2021-01-11 NOTE — H&P
Linda Linares is a 80year old male Patient presents with:  Physical: Medicare AWV    HPI:   Pt voices no concerns . Patient reports he had labs drawn at the [place] about 2 weeks ago. He gets labs every 4 weeks to monitor his blood count.   Patient states 2 (two) times daily. 1 Inhaler 0   • furosemide 20 MG Oral Tab Take 1 tablet (20 mg total) by mouth daily.  30 tablet 1   • Albuterol Sulfate  (90 Base) MCG/ACT Inhalation Aero Soln Inhale 2 puffs into the lungs every 4 (four) hours as needed for [facility] (PRINIVIL,ZESTRIL) 20 MG Oral Tab Take 20 mg by mouth 2 (two) times daily. • finasteride (PROSCAR) 5 MG Oral Tab Take 5 mg by mouth daily.      • ASPIRIN 81 MG OR TABS 1 TABLET DAILY-pt to check with Dr. Yeimy Lindquist regarding taking/holding prior to procedur • SHOULDER ARTHROSCOPY ROTATOR CUFF REPAIR Left 4/26/2016    Performed by Queta Leblanc MD at [address]   • SHOULDER ARTHROSCOPY ROTATOR CUFF REPAIR Right 11/10/2015    Performed by Queta Leblanc MD at [address] [facility] (CPT=9 stream, incontinence, erectile dysfunction, decreased libido , pt saw Dr Jerri Mcghee 1/7/21, PVR 91 ml- cont meds  MUSCULOSKELETAL: denies back or joint pain  NEURO: denies headaches, tremors, dizziness, numbness and weakness  PSYCHE: denies depression or anxie and sensory are grossly intact, DTRs +2/4 UE/LE bilaterally  PSYCH: affect: flat, speech: clear and coherent, slightly delayed, Insight: appropriate  ASSESSMENT AND PLAN:   Elder Dunne is a 80year old male   Encounter for annual health examination Previous elevated impaired FBS or GTT   … or any two of the following:   • Overweight (BMI ³25 but <30)   • Family history of diabetes   • Age 72 years or older   • History of gestational diabetes or birth of baby weighing more than 9 pounds     Covered at Negative for Occult Blood    No flowsheet data found.      Barium Enema-   uncomfortable but covered  Covered but uncomfortable   Glaucoma Screening      Ophthalmology Visit   Covered annually for Diabetics, people with Glaucoma family history,   Nancy Directives    [email or web address] org/publications/[email or web address]/personal_[email or web address]. pdf  An information packet, including necessary form from the [employer] website. http://www. [place]ph[place]state. [place]/public/books/advin.htm  A link to the [place] disease) [facility])  Discussed importance of daily walking regimen. Continue current meds, monitor clinically    9.  Benign non-nodular prostatic hyperplasia without lower urinary tract symptoms  Continue current medication, monitor clinically, follow-up with u

## 2021-01-11 NOTE — PATIENT INSTRUCTIONS
Harrison Gonzalez's SCREENING SCHEDULE   Tests on this list are recommended by your physician but may not be covered, or covered at this frequency, by your insurer. Please check with your insurance carrier before scheduling to verify coverage.     PREVENT following criteria:   • Men who are 73-68 years old and have smoked more than 100 cigarettes in their lifetime   • Anyone with a family history    Colorectal Cancer Screening Covered up to Age 76     Colonoscopy Screen   Covered every 10 years- more often Hemophiliacs who received Factor VIII or IX concentrates   Clients of institutions for the mentally retarded   Persons who live in the same house as a HepB virus carrier   Homosexual men   Illicit injectable drug abusers     Tetanus Toxoid- Only covered current inhaled medication regimen, monitor clinically    4. Hypercholesteremia  Reviewed goals for lipids. Continue statin therapy    5.  Essential hypertension  I reviewed goals for blood pressure as well as conservative management of hypertension includ

## 2021-01-26 DIAGNOSIS — Z23 NEED FOR VACCINATION: ICD-10-CM

## 2021-02-10 RX ORDER — ALLOPURINOL 300 MG/1
TABLET ORAL
Qty: 90 TABLET | Refills: 0 | Status: SHIPPED | OUTPATIENT
Start: 2021-02-10 | End: 2021-05-14

## 2021-02-15 ENCOUNTER — HOSPITAL ENCOUNTER (OUTPATIENT)
Dept: GENERAL RADIOLOGY | Age: 86
Discharge: HOME | End: 2021-02-15
Attending: FAMILY MEDICINE
Payer: MEDICARE

## 2021-02-15 ENCOUNTER — OFFICE VISIT (OUTPATIENT)
Dept: FAMILY MEDICINE CLINIC | Facility: CLINIC | Age: 86
End: 2021-02-15
Payer: MEDICARE

## 2021-02-15 VITALS
WEIGHT: 191 LBS | RESPIRATION RATE: 14 BRPM | OXYGEN SATURATION: 98 % | HEIGHT: 71 IN | DIASTOLIC BLOOD PRESSURE: 76 MMHG | TEMPERATURE: 98 F | SYSTOLIC BLOOD PRESSURE: 116 MMHG | HEART RATE: 61 BPM | BODY MASS INDEX: 26.74 KG/M2

## 2021-02-15 DIAGNOSIS — G89.29 CHRONIC RIGHT-SIDED LOW BACK PAIN WITHOUT SCIATICA: ICD-10-CM

## 2021-02-15 DIAGNOSIS — G89.29 CHRONIC RIGHT-SIDED LOW BACK PAIN WITHOUT SCIATICA: Primary | ICD-10-CM

## 2021-02-15 DIAGNOSIS — M54.50 CHRONIC RIGHT-SIDED LOW BACK PAIN WITHOUT SCIATICA: Primary | ICD-10-CM

## 2021-02-15 DIAGNOSIS — M48.061 SPINAL STENOSIS OF LUMBAR REGION WITHOUT NEUROGENIC CLAUDICATION: ICD-10-CM

## 2021-02-15 DIAGNOSIS — M54.50 CHRONIC RIGHT-SIDED LOW BACK PAIN WITHOUT SCIATICA: ICD-10-CM

## 2021-02-15 PROCEDURE — 99214 OFFICE O/P EST MOD 30 MIN: CPT | Performed by: FAMILY MEDICINE

## 2021-02-15 PROCEDURE — 72110 X-RAY EXAM L-2 SPINE 4/>VWS: CPT | Performed by: FAMILY MEDICINE

## 2021-02-15 RX ORDER — METHOCARBAMOL 500 MG/1
500 TABLET, FILM COATED ORAL 3 TIMES DAILY PRN
Qty: 21 TABLET | Refills: 0 | Status: SHIPPED | OUTPATIENT
Start: 2021-02-15 | End: 2021-10-20 | Stop reason: ALTCHOICE

## 2021-02-15 NOTE — PATIENT INSTRUCTIONS
I reviewed results of x-rays.   Would recommend moist heat followed by gentle stretching, patient instructed in spinal, hip, hamstring stretches  Patient referred for physical therapy  May continue acetaminophen up to 1000 mg 3 times daily as needed for yakov

## 2021-02-16 RX ORDER — FUROSEMIDE 20 MG/1
TABLET ORAL
Qty: 30 TABLET | Refills: 0 | Status: SHIPPED | OUTPATIENT
Start: 2021-02-16 | End: 2021-03-18

## 2021-02-16 RX ORDER — HYDROCHLOROTHIAZIDE 25 MG/1
TABLET ORAL
Qty: 90 TABLET | Refills: 0 | Status: SHIPPED | OUTPATIENT
Start: 2021-02-16 | End: 2021-05-17

## 2021-02-16 NOTE — TELEPHONE ENCOUNTER
Last OV was on 2/15/21    Last hydrochlorothiazide RF was #90 on 11/20/20    Last furosemide RF was #30 w/ 1 refill on 9/11/20---IS THIS ONE PRN? done

## 2021-02-19 ENCOUNTER — OFFICE VISIT (OUTPATIENT)
Dept: PHYSICAL THERAPY | Age: 86
End: 2021-02-19
Attending: FAMILY MEDICINE
Payer: MEDICARE

## 2021-02-19 DIAGNOSIS — G89.29 CHRONIC RIGHT-SIDED LOW BACK PAIN WITHOUT SCIATICA: ICD-10-CM

## 2021-02-19 DIAGNOSIS — M48.061 SPINAL STENOSIS OF LUMBAR REGION WITHOUT NEUROGENIC CLAUDICATION: ICD-10-CM

## 2021-02-19 DIAGNOSIS — M54.50 CHRONIC RIGHT-SIDED LOW BACK PAIN WITHOUT SCIATICA: ICD-10-CM

## 2021-02-19 PROCEDURE — 97112 NEUROMUSCULAR REEDUCATION: CPT

## 2021-02-19 PROCEDURE — 97162 PT EVAL MOD COMPLEX 30 MIN: CPT

## 2021-02-19 NOTE — PROGRESS NOTES
SPINE EVALUATION:   Referring Physician: Dr. Kym Dukes  Diagnosis: Chronic right-sided low back pain without sciatica     Date of Service: 2/19/2021     PATIENT SUMMARY   Kate Beltran is a 80year old male who presents to therapy today with complaints necessary to address the above impairments and reach functional goals. Precautions:  None  OBJECTIVE:   Observation/Posture: Min/Mod slumped, a little better posture correction, but pain with lumbar support (discontinued).  But also a little better, per to     Frequency / Duration: Patient will be seen for 2 x/week or a total of 16 visits over a 90 day period.  Treatment will include: Manual Therapy, Mechanical Traction, Neuromuscular Re-education, Self-Care Home Management, Therapeutic Activities, Therape

## 2021-02-23 ENCOUNTER — TELEPHONE (OUTPATIENT)
Dept: PHYSICAL THERAPY | Facility: HOSPITAL | Age: 86
End: 2021-02-23

## 2021-02-24 ENCOUNTER — OFFICE VISIT (OUTPATIENT)
Dept: PHYSICAL THERAPY | Age: 86
End: 2021-02-24
Attending: FAMILY MEDICINE
Payer: MEDICARE

## 2021-02-24 DIAGNOSIS — M54.50 CHRONIC RIGHT-SIDED LOW BACK PAIN WITHOUT SCIATICA: ICD-10-CM

## 2021-02-24 DIAGNOSIS — M48.061 SPINAL STENOSIS OF LUMBAR REGION WITHOUT NEUROGENIC CLAUDICATION: ICD-10-CM

## 2021-02-24 DIAGNOSIS — G89.29 CHRONIC RIGHT-SIDED LOW BACK PAIN WITHOUT SCIATICA: ICD-10-CM

## 2021-02-24 PROCEDURE — 97140 MANUAL THERAPY 1/> REGIONS: CPT

## 2021-02-24 PROCEDURE — 97112 NEUROMUSCULAR REEDUCATION: CPT

## 2021-02-24 NOTE — PROGRESS NOTES
SPINE EVALUATION:   Referring Physician: Dr. Raymon Torres  Diagnosis: Chronic right-sided low back pain without sciatica     Date of Service: 2/19/2021     PATIENT SUMMARY   Pt gets 5 minutes of relief with HEP (flexion/rotaiton knees to the R for 2-3 minutes the R 3-5 minutes, in sidelying, every hour and then prone lying 30 seconds, then repeated extension in lying    Charges: 1 manual therapy, 2 neuro re-education      Total Timed Treatment: 38 min     Total Treatment Time: 38 min       PLAN OF CARE:    Goal

## 2021-02-25 ENCOUNTER — TELEPHONE (OUTPATIENT)
Dept: PHYSICAL THERAPY | Facility: HOSPITAL | Age: 86
End: 2021-02-25

## 2021-02-26 ENCOUNTER — OFFICE VISIT (OUTPATIENT)
Dept: PHYSICAL THERAPY | Age: 86
End: 2021-02-26
Attending: FAMILY MEDICINE
Payer: MEDICARE

## 2021-02-26 PROCEDURE — 97140 MANUAL THERAPY 1/> REGIONS: CPT

## 2021-02-26 PROCEDURE — 97112 NEUROMUSCULAR REEDUCATION: CPT

## 2021-02-26 NOTE — PROGRESS NOTES
SPINE EVALUATION:   Referring Physician: Dr. Josh Denis  Diagnosis: Chronic right-sided low back pain without sciatica     Date of Service: 2/19/2021     PATIENT SUMMARY   Pt doing lumbar extension in prone after flexion/rotaiton knees to the R.  About the s therapy:  Extension mobilization: produce/increase R buttock pain, especially at L3-L4    Lateral forces from therapist hips off center: produce a lot of R sided pain, discontinued.     Repeated extension in lying with pt forces: produce no worse in R butto

## 2021-03-02 ENCOUNTER — TELEPHONE (OUTPATIENT)
Dept: PHYSICAL THERAPY | Facility: HOSPITAL | Age: 86
End: 2021-03-02

## 2021-03-04 ENCOUNTER — OFFICE VISIT (OUTPATIENT)
Dept: PHYSICAL THERAPY | Age: 86
End: 2021-03-04
Attending: FAMILY MEDICINE
Payer: MEDICARE

## 2021-03-04 PROCEDURE — 97112 NEUROMUSCULAR REEDUCATION: CPT

## 2021-03-04 PROCEDURE — 97140 MANUAL THERAPY 1/> REGIONS: CPT

## 2021-03-04 NOTE — PROGRESS NOTES
SPINE EVALUATION:   Referring Physician: Dr. Shivani Camargo  Diagnosis: Chronic right-sided low back pain without sciatica     Date of Service: 2/19/2021     PATIENT SUMMARY   Pt doing lumbar extension in prone after flexion/rotaiton knees to the R about every little relief  Flexion/rotation knees to the R (in R sidelying, therapist forces): During produce a little L sided pain and then center low back pain: after shift abolished    Flexion/rotation knees to the R (in R sidelying, therapist forces): During produ

## 2021-03-05 ENCOUNTER — OFFICE VISIT (OUTPATIENT)
Dept: PHYSICAL THERAPY | Age: 86
End: 2021-03-05
Attending: FAMILY MEDICINE
Payer: MEDICARE

## 2021-03-05 PROCEDURE — 97140 MANUAL THERAPY 1/> REGIONS: CPT

## 2021-03-05 PROCEDURE — 97110 THERAPEUTIC EXERCISES: CPT

## 2021-03-05 NOTE — PROGRESS NOTES
SPINE EVALUATION:   Referring Physician: Dr. Heather Haji  Diagnosis: Chronic right-sided low back pain without sciatica     Date of Service: 2/19/2021     PATIENT SUMMARY   A little better, walking with less pain than typical in the morning and if he keeps u instructed in and issued a HEP for: Flexion/rotation knees to the R 3-5 minutes and plantar flexion in sitting into 2 blue pands 30 reps    Charges: 2 manual therapy, 1 therex      Total Timed Treatment: 38 min     Total Treatment Time: 38 min       PLAN O

## 2021-03-09 ENCOUNTER — OFFICE VISIT (OUTPATIENT)
Dept: PHYSICAL THERAPY | Age: 86
End: 2021-03-09
Attending: FAMILY MEDICINE
Payer: MEDICARE

## 2021-03-09 PROCEDURE — 97110 THERAPEUTIC EXERCISES: CPT

## 2021-03-09 PROCEDURE — 97140 MANUAL THERAPY 1/> REGIONS: CPT

## 2021-03-09 NOTE — PROGRESS NOTES
SPINE EVALUATION:   Referring Physician: Dr. Wan West  Diagnosis: Chronic right-sided low back pain without sciatica     Date of Service: 3/9/2021      Progress Summary  Pt has attended 6 visits in Physical Therapy.      PATIENT SUMMARY   Improving, walked day period.  Treatment will include: manual therapy, therapeutic exercise, therapeutic activites, neuromuscular reeducation and home program.  Next visit: quickly assess extension unloaded stretching vs. Flexion unloaded to determine what is needed in sagit plantar flexion in sitting into 2 blue pands 30 reps    Charges: 2 manual therapy, 1 therex      Total Timed Treatment: 38 min     Total Treatment Time: 38 min

## 2021-03-12 ENCOUNTER — OFFICE VISIT (OUTPATIENT)
Dept: PHYSICAL THERAPY | Age: 86
End: 2021-03-12
Attending: FAMILY MEDICINE
Payer: MEDICARE

## 2021-03-12 PROCEDURE — 97112 NEUROMUSCULAR REEDUCATION: CPT

## 2021-03-12 PROCEDURE — 97140 MANUAL THERAPY 1/> REGIONS: CPT

## 2021-03-12 NOTE — PROGRESS NOTES
Dx: Chronic right-sided low back pain without sciatica              Insurance (Authorized # of Visits):  N/A           Authorizing Physician: Dr. Dontrell Ramos  Next MD visit: none scheduled  Fall Risk: standard         Precautions: n/a             Subjective: his home program    Plan: Re-assess repeated extension in lying, posture education  Date: 3/12/2021  TX#: 7/16 Date:                 TX#: 3/ Date:                 TX#: 4/ Date:                 TX#: 5/ Date:    Tx#: 6/   Neuro re-education: 30 minutes  Anjum

## 2021-03-15 ENCOUNTER — OFFICE VISIT (OUTPATIENT)
Dept: PHYSICAL THERAPY | Age: 86
End: 2021-03-15
Attending: FAMILY MEDICINE
Payer: MEDICARE

## 2021-03-15 PROCEDURE — 97112 NEUROMUSCULAR REEDUCATION: CPT

## 2021-03-15 PROCEDURE — 97140 MANUAL THERAPY 1/> REGIONS: CPT

## 2021-03-15 NOTE — PROGRESS NOTES
Dx: Chronic right-sided low back pain without sciatica              Insurance (Authorized # of Visits):  N/A           Authorizing Physician: Dr. Jacquelyn Felipe  Next MD visit: none scheduled  Fall Risk: standard         Precautions: n/a             Subjective: After a bit better  Repeated flexion in lying x15: produce, no worse, after a bit worse with more pain to extension  Education to sit up with tall in firm chair to watch TV, education to use lumbar support and on pathology and plan of care.   Line backer HE manual therapy       Total Timed Treatment: 45 min  Total Treatment Time: 45 min

## 2021-03-17 ENCOUNTER — APPOINTMENT (OUTPATIENT)
Dept: PHYSICAL THERAPY | Age: 86
End: 2021-03-17
Attending: FAMILY MEDICINE
Payer: MEDICARE

## 2021-03-18 RX ORDER — FUROSEMIDE 20 MG/1
20 TABLET ORAL DAILY
Qty: 90 TABLET | Refills: 0 | Status: SHIPPED | OUTPATIENT
Start: 2021-03-18 | End: 2021-06-17

## 2021-03-18 NOTE — TELEPHONE ENCOUNTER
Last refill: 02/16/21  Qty: 30  W/ 0 refills  Last ov: 02/15/21    Requested Prescriptions     Pending Prescriptions Disp Refills   • FUROSEMIDE 20 MG Oral Tab [Pharmacy Med Name: Furosemide 20 MG Oral Tablet] 30 tablet 0     Sig: Take 1 tablet by mouth on

## 2021-03-19 ENCOUNTER — OFFICE VISIT (OUTPATIENT)
Dept: PHYSICAL THERAPY | Age: 86
End: 2021-03-19
Attending: FAMILY MEDICINE
Payer: MEDICARE

## 2021-03-19 PROCEDURE — 97110 THERAPEUTIC EXERCISES: CPT

## 2021-03-19 PROCEDURE — 97112 NEUROMUSCULAR REEDUCATION: CPT

## 2021-03-19 NOTE — PROGRESS NOTES
Dx: Chronic right-sided low back pain without sciatica              Insurance (Authorized # of Visits):  N/A           Authorizing Physician: Dr. Miguel Green  Next MD visit: none scheduled  Fall Risk: standard         Precautions: n/a             Subjective: improvement, see if he can do less flexion/rotation home program, or maybe manual therapy into extension  Date: 3/12/2021  TX#: 7/16 Date:       3/15/2021          TX#: 8/16 Date:       3/19/2021          TX#: 9/16 Date:                 TX#: 5/ Date:    Tx# times with correct form to produce familiar times.  (6 minutes)  Step up 6\" and 9\" 2-5 reps, 3 sets (4 minutes)  Cues to sit and stand tall, practiced in clinic with head up tall and back straight. (2 minutes)            Manual therapy 14 minutes:  R side

## 2021-03-24 ENCOUNTER — OFFICE VISIT (OUTPATIENT)
Dept: PHYSICAL THERAPY | Age: 86
End: 2021-03-24
Attending: FAMILY MEDICINE
Payer: MEDICARE

## 2021-03-24 PROCEDURE — 97110 THERAPEUTIC EXERCISES: CPT

## 2021-03-24 PROCEDURE — 97140 MANUAL THERAPY 1/> REGIONS: CPT

## 2021-03-24 NOTE — PROGRESS NOTES
Dx: Chronic right-sided low back pain without sciatica              Insurance (Authorized # of Visits):  N/A           Authorizing Physician: Dr. Wilber Shah  Next MD visit: none scheduled  Fall Risk: standard         Precautions: n/a             Subjective: strengthening to home program.  Date: 3/12/2021  TX#: 7/16 Date:       3/15/2021          TX#: 8/16 Date:       3/19/2021          TX#: 9/16 Date:  3/24/2021                 TX#: 10/16 Date:    Tx#: 6/   Neuro re-education: 30 minutes  Repeated extension in minutes)  Step up 6\" and 9\" 2-5 reps, 3 sets (4 minutes)  Cues to sit and stand tall, practiced in clinic with head up tall and back straight. (2 minutes) Manual therapy:  Extension/rotation mobilization on R side of spine and then flexion/rotation knees

## 2021-03-31 ENCOUNTER — OFFICE VISIT (OUTPATIENT)
Dept: PHYSICAL THERAPY | Age: 86
End: 2021-03-31
Attending: FAMILY MEDICINE
Payer: MEDICARE

## 2021-03-31 PROCEDURE — 97112 NEUROMUSCULAR REEDUCATION: CPT

## 2021-03-31 PROCEDURE — 97140 MANUAL THERAPY 1/> REGIONS: CPT

## 2021-03-31 NOTE — PROGRESS NOTES
Progress Summary  Pt has attended 11 visits in Physical Therapy.      Dx: Chronic right-sided low back pain without sciatica              Insurance (Authorized # of Visits):  N/A           Authorizing Physician: Dr. Nelida Philippe  Next MD visit: none scheduled Continue skilled Physical Therapy 1 x/week or a total of 16 visits over a 90 day period.  Treatment will include: manual therapy, therapeutic exercise, therapeutic activites, neuromuscular reeducation and home program  Additional plan: See effect of repeate chair to watch TV, education to use lumbar support and on pathology and plan of care.   Sitting overcorrect, relax 15%, 10 reps with 1 minute hold last rep  Line backer x15 reps  Educated on plan of care, and to increase adherence, and if he has pain due to minutes)  Repeated extension in standing x15 (cues for correct form): After step up a little better and hip abduction MMT equal bilaterally.   Posture education: practiced standing and walking slow posture with verbal, tactile cues for extreme of good postu ceiling. 6. Sit with your ankles crossed and push your right knee towards the floor 15 times    Reminders:  A.  Sit up tall, watch TV in a firm chair with back support    Charges: 1 manual therapy, 2 neuro re-education     Total Timed Treatment: 37 m

## 2021-04-02 ENCOUNTER — APPOINTMENT (OUTPATIENT)
Dept: PHYSICAL THERAPY | Age: 86
End: 2021-04-02
Attending: FAMILY MEDICINE
Payer: MEDICARE

## 2021-04-05 ENCOUNTER — APPOINTMENT (OUTPATIENT)
Dept: PHYSICAL THERAPY | Age: 86
End: 2021-04-05
Attending: FAMILY MEDICINE
Payer: MEDICARE

## 2021-04-07 ENCOUNTER — OFFICE VISIT (OUTPATIENT)
Dept: PHYSICAL THERAPY | Age: 86
End: 2021-04-07
Attending: FAMILY MEDICINE
Payer: MEDICARE

## 2021-04-07 PROCEDURE — 97140 MANUAL THERAPY 1/> REGIONS: CPT

## 2021-04-07 PROCEDURE — 97112 NEUROMUSCULAR REEDUCATION: CPT

## 2021-04-07 NOTE — PROGRESS NOTES
Dx: Chronic right-sided low back pain without sciatica              Insurance (Authorized # of Visits):  N/A           Authorizing Physician: Dr. Jacquelyn Felipe  Next MD visit: none scheduled  Fall Risk: standard         Precautions: n/a             Subjective: they did on 3/24/2021, otherwise add to home program.         Date: 3/12/2021  TX#: 7/16 Date:       3/15/2021          TX#: 8/16 Date:       3/19/2021          TX#: 9/16 Date:  3/24/2021                 TX#: 10/16 Date: 3/31/2021  Tx#: 11/16 4/7/2021  Tx# several times with correct form to produce familiar times.  (6 minutes)  Step up 6\" and 9\" 2-5 reps, 3 sets (4 minutes)  Cues to sit and stand tall, practiced in clinic with head up tall and back straight. (2 minutes) Manual therapy:  Extension/rotation m worse.  Repeated extension in lying x10 reps (after manual therapy)  Discussed plan of care, and plan to get further improvement at this point. Motivational interviewing to continue HEP frequency.             Manual therapy 26 minutes:   Extension/rotation

## 2021-04-12 ENCOUNTER — APPOINTMENT (OUTPATIENT)
Dept: PHYSICAL THERAPY | Age: 86
End: 2021-04-12
Attending: FAMILY MEDICINE
Payer: MEDICARE

## 2021-04-14 ENCOUNTER — APPOINTMENT (OUTPATIENT)
Dept: PHYSICAL THERAPY | Age: 86
End: 2021-04-14
Attending: FAMILY MEDICINE
Payer: MEDICARE

## 2021-04-19 ENCOUNTER — APPOINTMENT (OUTPATIENT)
Dept: PHYSICAL THERAPY | Age: 86
End: 2021-04-19
Attending: FAMILY MEDICINE
Payer: MEDICARE

## 2021-04-21 ENCOUNTER — OFFICE VISIT (OUTPATIENT)
Dept: PHYSICAL THERAPY | Age: 86
End: 2021-04-21
Attending: FAMILY MEDICINE
Payer: MEDICARE

## 2021-04-21 PROCEDURE — 97112 NEUROMUSCULAR REEDUCATION: CPT

## 2021-04-21 PROCEDURE — 97140 MANUAL THERAPY 1/> REGIONS: CPT

## 2021-04-21 NOTE — PROGRESS NOTES
Progress Summary  Pt has attended 13 visits in Physical Therapy.      Dx: Chronic right-sided low back pain without sciatica              Insurance (Authorized # of Visits):  N/A           Authorizing Physician: Dr. Parish Thompson  Next MD visit: none scheduled 16 visits over a 90 day period. Treatment will include: manual therapy, therapeutic exercise, therapeutic activites, neuromuscular reeducation and home program     Additional plan: Re-assess new hip stretching.  See effect of repeated flexion in lying and s support and on pathology and plan of care.   Sitting overcorrect, relax 15%, 10 reps with 1 minute hold last rep  Line backer x15 reps  Educated on plan of care, and to increase adherence, and if he has pain due to poor posture and to do HEP to get rid of i (cues for correct form): After step up a little better and hip abduction MMT equal bilaterally.   Posture education: practiced standing and walking slow posture with verbal, tactile cues for extreme of good posture and relaxed 10%, practiced raking with num flexion/rotation with therapist overpressure.  Extension mobilization       Manual therapy 14 minutes:  R sided extension/rotation mobilization and flexion/rotaiton knees to the R therapist forces Manual therapy 14 minutes:  R sided extension/rotation mobil

## 2021-04-26 ENCOUNTER — APPOINTMENT (OUTPATIENT)
Dept: PHYSICAL THERAPY | Age: 86
End: 2021-04-26
Attending: FAMILY MEDICINE
Payer: MEDICARE

## 2021-04-28 ENCOUNTER — APPOINTMENT (OUTPATIENT)
Dept: PHYSICAL THERAPY | Age: 86
End: 2021-04-28
Attending: FAMILY MEDICINE
Payer: MEDICARE

## 2021-05-03 ENCOUNTER — APPOINTMENT (OUTPATIENT)
Dept: PHYSICAL THERAPY | Age: 86
End: 2021-05-03
Attending: FAMILY MEDICINE
Payer: MEDICARE

## 2021-05-05 ENCOUNTER — OFFICE VISIT (OUTPATIENT)
Dept: PHYSICAL THERAPY | Age: 86
End: 2021-05-05
Attending: FAMILY MEDICINE
Payer: MEDICARE

## 2021-05-05 PROCEDURE — 97140 MANUAL THERAPY 1/> REGIONS: CPT

## 2021-05-05 PROCEDURE — 97110 THERAPEUTIC EXERCISES: CPT

## 2021-05-05 NOTE — PROGRESS NOTES
Dx: Chronic right-sided low back pain without sciatica              Insurance (Authorized # of Visits):  16 via [facility], Medicare.            Authorizing Physician: Dr. Marilee Gutierrez  Next MD visit: none scheduled  Fall Risk: standard         Precautions: n/a x/ two weeks or a total of 16 visits over a 90 day period. Treatment will include: manual therapy, therapeutic exercise, therapeutic activites, neuromuscular reeducation and home program     Additional plan: Re-assess new hip stretching.  See effect of repe flexion/rotation knees to the L after. End of session last 15 minutes, additional neuro re-education:   Repeated extension in lying 15 reps  Repeated extension in standing reviewed form  Sidelying hip abduction, cues for correct form 10 reps, each leg. hours: 1. Black Douse on your left side for 3-5 minutes with your right leg hanging down and your right shoulder behind you.   a. If the right side gets worse, do this exercise on the other side, the way you have been.   2. Then on your stomach, push up with your

## 2021-05-13 NOTE — TELEPHONE ENCOUNTER
Last refill #90 on 2/10/2021  Last office visit pertaining to refill on 2/15/2021  Future Appointments   Date Time Provider Wade Lawrence   5/21/2021 10:15 AM [place] Alberto PT [facility] [place]   6/2/2021 11:15 AM [place] Alberto PT [facility] [place]

## 2021-05-14 RX ORDER — ALLOPURINOL 300 MG/1
TABLET ORAL
Qty: 90 TABLET | Refills: 0 | Status: SHIPPED | OUTPATIENT
Start: 2021-05-14 | End: 2021-08-12

## 2021-05-17 ENCOUNTER — APPOINTMENT (OUTPATIENT)
Dept: PHYSICAL THERAPY | Age: 86
End: 2021-05-17
Attending: FAMILY MEDICINE
Payer: MEDICARE

## 2021-05-17 RX ORDER — HYDROCHLOROTHIAZIDE 25 MG/1
TABLET ORAL
Qty: 90 TABLET | Refills: 0 | Status: SHIPPED | OUTPATIENT
Start: 2021-05-17 | End: 2021-08-15

## 2021-05-17 NOTE — TELEPHONE ENCOUNTER
Last refill: 02/16/21  Qty: 90  W/ 0 refills  Last ov: 01/11/21    Requested Prescriptions     Pending Prescriptions Disp Refills   • HYDROCHLOROTHIAZIDE 25 MG Oral Tab [Pharmacy Med Name: hydroCHLOROthiazide 25 MG Oral Tablet] 90 tablet 0     Sig: Take 1

## 2021-05-21 ENCOUNTER — OFFICE VISIT (OUTPATIENT)
Dept: PHYSICAL THERAPY | Age: 86
End: 2021-05-21
Attending: FAMILY MEDICINE
Payer: MEDICARE

## 2021-05-21 PROCEDURE — 97110 THERAPEUTIC EXERCISES: CPT

## 2021-05-21 PROCEDURE — 97112 NEUROMUSCULAR REEDUCATION: CPT

## 2021-05-21 NOTE — PROGRESS NOTES
Progress Summary  Pt has attended 15 visits in Physical Therapy. Dx: Chronic right-sided low back pain without sciatica              Insurance (Authorized # of Visits):  16 via [facility], Medicare.            Authorizing Physician: Dr. Louise Han lumbar ROM to min/nil loss and free motion to allow improved ability to walk, stand and perform house work.  (95% improvement on 5/21/2021)    New Goal:   Pt will improve FOTO score from 70/100 to 80/100 to display improved ability to walk and perform heavy feels weak, lasts about 30 seconds, but does not display significant weakness. Extension/rotation mobilization on R side of back and then flexion/rotation knees to the L after.     End of session last 15 minutes, additional neuro re-education:   Repeated e ability to walk)  Step up x10 each leg 9\"  Cues for correct form with new home program, see below and visual, verbal and tactile cues to display new home program. Picture given.      Manual therapy 26 minutes:   Extension/rotation mobilization on R side of firm chair with back support  B. Bend backwards a few times when shopping, golfing.   C. Next visit: June 2 at 11:15am    Charges: 1 neuro re-education, 2 there ex     Total Timed Treatment: 39 min  Total Treatment Time: 39 min

## 2021-06-02 ENCOUNTER — TELEPHONE (OUTPATIENT)
Dept: PHYSICAL THERAPY | Age: 86
End: 2021-06-02

## 2021-06-02 ENCOUNTER — APPOINTMENT (OUTPATIENT)
Dept: PHYSICAL THERAPY | Age: 86
End: 2021-06-02
Attending: FAMILY MEDICINE
Payer: MEDICARE

## 2021-06-03 RX ORDER — AMLODIPINE BESYLATE 5 MG/1
TABLET ORAL
Qty: 180 TABLET | Refills: 0 | Status: SHIPPED | OUTPATIENT
Start: 2021-06-03 | End: 2022-01-19

## 2021-06-03 NOTE — TELEPHONE ENCOUNTER
Last refill #180 on 12/7/2020  Last office visit pertaining to refill on 1/11/2021  Future Appointments   Date Time Provider Wade Melinda   6/9/2021  4:00 PM Lisa Choudhary, [facility]   6/14/2021 10:30 AM Lisa Choudhary, [facility]

## 2021-06-09 ENCOUNTER — OFFICE VISIT (OUTPATIENT)
Dept: PHYSICAL THERAPY | Age: 86
End: 2021-06-09
Attending: FAMILY MEDICINE
Payer: MEDICARE

## 2021-06-09 PROCEDURE — 97140 MANUAL THERAPY 1/> REGIONS: CPT

## 2021-06-09 PROCEDURE — 97110 THERAPEUTIC EXERCISES: CPT

## 2021-06-09 NOTE — PROGRESS NOTES
Dx: Chronic right-sided low back pain without sciatica              Insurance (Authorized # of Visits):  20 via [address], Medicare.            Authorizing Physician: Dr. Kyle Smith MD visit: none scheduled  Fall Risk: standard         Precautions: n/a housework. (20% improvement on 5/21/2021)    FOTO: 72/100    Plan:Re-assess home program, see if this affects slow walking and then see with manual therapy today.     4/21/2021  Tx#: 13/16 5/5/2021  Tx#: 14/16 5/21/2021  Tx# 15/20 6/9/2021  Tx# 16/20 ex and manual therapy in session)        Manual therapy 16 minutes:  Repeated flexion in lying therapist forces x10 reps: worse, flexion now hurts  flexion/rotation with therapist overpressure: knees to R: vi/green  Flexion/rotation with therapist overp

## 2021-06-14 ENCOUNTER — APPOINTMENT (OUTPATIENT)
Dept: PHYSICAL THERAPY | Age: 86
End: 2021-06-14
Attending: FAMILY MEDICINE
Payer: MEDICARE

## 2021-06-15 ENCOUNTER — APPOINTMENT (OUTPATIENT)
Dept: PHYSICAL THERAPY | Age: 86
End: 2021-06-15
Attending: FAMILY MEDICINE
Payer: MEDICARE

## 2021-06-17 RX ORDER — FUROSEMIDE 20 MG/1
20 TABLET ORAL DAILY
Qty: 30 TABLET | Refills: 0 | Status: SHIPPED | OUTPATIENT
Start: 2021-06-17 | End: 2021-07-19

## 2021-06-17 NOTE — TELEPHONE ENCOUNTER
Last refill #90 on 3/18/2021  Last office visit pertaining to refill on 9/15/2020  Future Appointments   Date Time Provider Wade Lawrence   6/28/2021 10:30 AM Ruthie Stewart, PT [facility]   7/14/2021 10:30 AM [facility] Stewart[facility]

## 2021-06-28 ENCOUNTER — OFFICE VISIT (OUTPATIENT)
Dept: PHYSICAL THERAPY | Age: 86
End: 2021-06-28
Attending: FAMILY MEDICINE
Payer: MEDICARE

## 2021-06-28 PROCEDURE — 97140 MANUAL THERAPY 1/> REGIONS: CPT

## 2021-06-28 PROCEDURE — 97110 THERAPEUTIC EXERCISES: CPT

## 2021-06-28 NOTE — PROGRESS NOTES
Progress Summary  Pt has attended 17 visits in Physical Therapy. Dx: Chronic right-sided low back pain without sciatica              Insurance (Authorized # of Visits):  20 via [address], Medicare.            Authorizing Physician: Dr. Wilber Smith MD vi improved ability to walk and perform heavy housework. (30% improvement on 6/28/2021)    FOTO: 73/100    Plan: Continue skilled Physical Therapy 1 x/week or a total of 19 visits over a 90 day period.  Treatment will include: manual therapy, therapeutic exerc better, R buttock pain is abolished. There ex 23 minutes:  Repeated flexion in lying x10 reps (then manual therapy below occurred next)  Step up 7 reps R and 2 reps L  Bridge single leg bias 10 reps each leg  Repeated extension in lying: after vi/green. overpressure: now no pain slow walking 3 minutes and no pain to side glides   Manual therapy 23 minutes:  Extension/rotation mobilization on R side of spine  flexion/rotation with therapist overpressure knees to the R  Flexion in lying with therapist overp

## 2021-06-29 ENCOUNTER — APPOINTMENT (OUTPATIENT)
Dept: PHYSICAL THERAPY | Age: 86
End: 2021-06-29
Attending: FAMILY MEDICINE
Payer: MEDICARE

## 2021-07-12 ENCOUNTER — TELEPHONE (OUTPATIENT)
Dept: PHYSICAL THERAPY | Facility: HOSPITAL | Age: 86
End: 2021-07-12

## 2021-07-13 ENCOUNTER — OFFICE VISIT (OUTPATIENT)
Dept: PHYSICAL THERAPY | Age: 86
End: 2021-07-13
Attending: FAMILY MEDICINE
Payer: MEDICARE

## 2021-07-13 PROCEDURE — 97140 MANUAL THERAPY 1/> REGIONS: CPT

## 2021-07-13 PROCEDURE — 97110 THERAPEUTIC EXERCISES: CPT

## 2021-07-13 NOTE — PROGRESS NOTES
Dx: Chronic right-sided low back pain without sciatica              Insurance (Authorized # of Visits):  20 via [address], Medicare. Authorizing Physician: Dr. Liana Galindo ref.  provider found  Next MD visit: none scheduled  Fall Risk: standard         Precautio 17/20 7/13/2021  Tx: 18/20     R hip extension x15 reps in lunge position: no effect after  Same but now with hip external rotation bias x15 reps: no effect to supine, but after prone ROM is improved in hip  Educated on cues for correct form with this exer of pt's ability to walk, so about 15 minutes total of walking to assess effect of there ex and manual therapy in session) There ex:  Flexion/rotation knees to the R 30'  Extension in lying x8: produce, better, after reports better in prone.   (In between th times, then repeat two more groups of 10 on each leg         5. [place] on your side, have your behind you, towards your back. Then raise your leg up to the ceiling. 4. Lay on your back, bend one leg, and make the other leg straight. Raise up your buttock.   5

## 2021-07-14 ENCOUNTER — APPOINTMENT (OUTPATIENT)
Dept: PHYSICAL THERAPY | Age: 86
End: 2021-07-14
Attending: FAMILY MEDICINE
Payer: MEDICARE

## 2021-07-15 ENCOUNTER — TELEPHONE (OUTPATIENT)
Dept: PHYSICAL THERAPY | Age: 86
End: 2021-07-15

## 2021-07-15 NOTE — TELEPHONE ENCOUNTER
Pt called therapist and reported that doing extension in lying for lumbar stretching after flexion/rotation had no effect, and maybe even made back worse.     Therapist told pt to stop extension in lying (via a voicemail) and told him that they would re-ass

## 2021-07-19 RX ORDER — FUROSEMIDE 20 MG/1
20 TABLET ORAL DAILY
Qty: 90 TABLET | Refills: 0 | Status: SHIPPED | OUTPATIENT
Start: 2021-07-19 | End: 2021-10-19

## 2021-07-19 NOTE — TELEPHONE ENCOUNTER
Last refill #30 on 6/17/2021  Last office visit pertaining to refill on 2/15/2021  Future Appointments   Date Time Provider Wade Lawrence   8/23/2021  9:45 AM Grace Edwards, [facility] [address]     Patient is due for labs and office visit  Reminder letter sent.

## 2021-07-21 ENCOUNTER — APPOINTMENT (OUTPATIENT)
Dept: PHYSICAL THERAPY | Age: 86
End: 2021-07-21
Attending: FAMILY MEDICINE
Payer: MEDICARE

## 2021-07-22 ENCOUNTER — APPOINTMENT (OUTPATIENT)
Dept: PHYSICAL THERAPY | Age: 86
End: 2021-07-22
Attending: FAMILY MEDICINE
Payer: MEDICARE

## 2021-08-05 ENCOUNTER — APPOINTMENT (OUTPATIENT)
Dept: PHYSICAL THERAPY | Age: 86
End: 2021-08-05
Attending: FAMILY MEDICINE
Payer: MEDICARE

## 2021-08-12 RX ORDER — ALLOPURINOL 300 MG/1
TABLET ORAL
Qty: 30 TABLET | Refills: 0 | Status: SHIPPED | OUTPATIENT
Start: 2021-08-12 | End: 2021-09-14

## 2021-08-12 NOTE — TELEPHONE ENCOUNTER
Last refill #90 on 5/14/2021  Last office visit pertaining to refill on 2/15/2021  Future Appointments   Date Time Provider Wade Lawrence   8/23/2021  9:45 AM Britney Edwards, [facility] [address]     Patient is overdue for labs  Lab Results   Componen

## 2021-08-14 NOTE — TELEPHONE ENCOUNTER
Last office visit: 2/15/21  Last refill: 5/17/21  Last labs: 1/19/21  [facility] SERVICES MESSAGE SENT TO PATIENT TO SCHEDULE OFFICE VISIT.   Future Appointments   Date Time Provider Wade Lawrence   8/23/2021  9:45 AM Jennifer Pickering, [address] [facility] Carlos Enrique     Name

## 2021-08-15 RX ORDER — HYDROCHLOROTHIAZIDE 25 MG/1
TABLET ORAL
Qty: 90 TABLET | Refills: 0 | Status: SHIPPED | OUTPATIENT
Start: 2021-08-15 | End: 2021-11-15

## 2021-08-23 ENCOUNTER — OFFICE VISIT (OUTPATIENT)
Dept: PHYSICAL THERAPY | Age: 86
End: 2021-08-23
Attending: FAMILY MEDICINE
Payer: MEDICARE

## 2021-08-23 PROCEDURE — 97110 THERAPEUTIC EXERCISES: CPT

## 2021-08-23 PROCEDURE — 97112 NEUROMUSCULAR REEDUCATION: CPT

## 2021-08-23 NOTE — PROGRESS NOTES
Progress Summary  Pt has attended 19 visits in Physical Therapy. Dx: Chronic right-sided low back pain without sciatica              Insurance (Authorized # of Visits):  20 via [address], Medicare. Authorizing Physician: Dr. Cherri Humphreys ref.  provider found 67/100    Plan: Continue skilled Physical Therapy 1-2 x/week or a total of 22 visits over a 90 day period.  Treatment will include: manual therapy, therapeutic exercise, therapeutic activites, neuromuscular reeducation and home program       Patient/Family/ ability to walk, so 12 minutes total of walking to assess effect of there ex and manual therapy in session) There ex:  Flexion in standing x10: no effect  (In between there ex and manual therapy listed today for each set pt did about 4-5 minutes of walking and plan of care. Discussed pathology and role of new home program.  Reviewed posture for ideal spinal loading. Neuro re-education:  Re-assessed goals, progress and plan of care.   Discussed pathology and role of new home program.            HEP:  Eight

## 2021-08-26 ENCOUNTER — TELEPHONE (OUTPATIENT)
Dept: PHYSICAL THERAPY | Age: 86
End: 2021-08-26

## 2021-08-30 ENCOUNTER — OFFICE VISIT (OUTPATIENT)
Dept: PHYSICAL THERAPY | Age: 86
End: 2021-08-30
Attending: FAMILY MEDICINE
Payer: MEDICARE

## 2021-08-30 PROCEDURE — 97112 NEUROMUSCULAR REEDUCATION: CPT

## 2021-08-30 PROCEDURE — 97110 THERAPEUTIC EXERCISES: CPT

## 2021-08-30 NOTE — PROGRESS NOTES
Discharge Summary  Pt has attended 20 visits in Physical Therapy. Dx: Chronic right-sided low back pain without sciatica              Insurance (Authorized # of Visits):  20 via [address] [facility], Medicare.            Authorizing Physician: Dr. Thu Aguirre care.    Thank you for your referral. If you have any questions, please contact me at Dept: [phone].     Sincerely,  Electronically signed by therapist: Gabe Dominguez PT     Physician's certification required:  No  Please co-sign or sign and return thi lying x8: produce, better, after reports better in prone.   (In between there ex and manual therapy listed today for each set pt did about 5 minutes of walking, and prior, to assess effect on there ex of pt's ability to walk, so about 8 minutes total of wal of care. Discussed pathology and role of new home program. Neuro re-education:  Re-assessed goals, progress and plan of care.   Discussed pathology and role of new home program.  Importance of continued home program.           HEP:  Four to Six times a day

## 2021-09-08 ENCOUNTER — OFFICE VISIT (OUTPATIENT)
Dept: OTOLARYNGOLOGY | Age: 86
End: 2021-09-08

## 2021-09-08 VITALS — BODY MASS INDEX: 25.9 KG/M2 | HEIGHT: 71 IN | WEIGHT: 185 LBS

## 2021-09-08 DIAGNOSIS — H62.40 FUNGAL OTITIS EXTERNA: ICD-10-CM

## 2021-09-08 DIAGNOSIS — H61.22 IMPACTED CERUMEN OF LEFT EAR: Primary | ICD-10-CM

## 2021-09-08 DIAGNOSIS — B36.9 FUNGAL OTITIS EXTERNA: ICD-10-CM

## 2021-09-08 DIAGNOSIS — K14.8 TONGUE LESION: ICD-10-CM

## 2021-09-08 DIAGNOSIS — K13.70 UNSPECIFIED LESIONS OF ORAL MUCOSA: ICD-10-CM

## 2021-09-08 PROBLEM — N40.0 BENIGN PROSTATIC HYPERPLASIA: Status: ACTIVE | Noted: 2021-09-08

## 2021-09-08 PROBLEM — N18.9 ANEMIA DUE TO CHRONIC KIDNEY DISEASE: Status: ACTIVE | Noted: 2020-09-24

## 2021-09-08 PROBLEM — D63.1 ANEMIA DUE TO CHRONIC KIDNEY DISEASE: Status: ACTIVE | Noted: 2020-09-24

## 2021-09-08 PROBLEM — E03.9 HYPOTHYROIDISM: Status: ACTIVE | Noted: 2021-09-08

## 2021-09-08 PROBLEM — N52.9 IMPOTENCE OF ORGANIC ORIGIN: Status: ACTIVE | Noted: 2021-09-08

## 2021-09-08 PROBLEM — Z95.1 HISTORY OF FOUR VESSEL CORONARY ARTERY BYPASS GRAFT: Status: ACTIVE | Noted: 2021-09-08

## 2021-09-08 PROBLEM — I48.92 ATRIAL FLUTTER (CMD): Status: ACTIVE | Noted: 2017-08-18

## 2021-09-08 PROBLEM — N18.32 CHRONIC KIDNEY DISEASE (CKD) STAGE G3B/A1, MODERATELY DECREASED GLOMERULAR FILTRATION RATE (GFR) BETWEEN 30-44 ML/MIN/1.73 SQUARE METER AND ALBUMINURIA CREATININE RATIO LESS THAN 30 MG/G (CMD): Status: ACTIVE | Noted: 2020-01-27

## 2021-09-08 PROBLEM — H40.9 GLAUCOMA: Status: ACTIVE | Noted: 2021-09-08

## 2021-09-08 PROBLEM — I35.0 NONRHEUMATIC AORTIC VALVE STENOSIS: Status: ACTIVE | Noted: 2020-08-11

## 2021-09-08 PROBLEM — I10 BENIGN ESSENTIAL HYPERTENSION: Status: ACTIVE | Noted: 2021-09-08

## 2021-09-08 PROBLEM — I25.9 CHRONIC ISCHEMIC HEART DISEASE: Status: ACTIVE | Noted: 2021-09-08

## 2021-09-08 PROCEDURE — 99203 OFFICE O/P NEW LOW 30 MIN: CPT | Performed by: OTOLARYNGOLOGY

## 2021-09-08 PROCEDURE — 40808 BIOPSY OF MOUTH LESION: CPT | Performed by: OTOLARYNGOLOGY

## 2021-09-08 PROCEDURE — 69210 REMOVE IMPACTED EAR WAX UNI: CPT | Performed by: OTOLARYNGOLOGY

## 2021-09-08 RX ORDER — CLOTRIMAZOLE 1 G/ML
SOLUTION TOPICAL
Qty: 15 ML | Refills: 0 | Status: SHIPPED | OUTPATIENT
Start: 2021-09-08

## 2021-09-14 RX ORDER — ALLOPURINOL 300 MG/1
TABLET ORAL
Qty: 30 TABLET | Refills: 0 | Status: SHIPPED | OUTPATIENT
Start: 2021-09-14 | End: 2021-10-13

## 2021-09-16 ENCOUNTER — TELEPHONE (OUTPATIENT)
Dept: OTOLARYNGOLOGY | Age: 86
End: 2021-09-16

## 2021-09-24 ENCOUNTER — APPOINTMENT (OUTPATIENT)
Dept: OTOLARYNGOLOGY | Age: 86
End: 2021-09-24

## 2021-09-24 ENCOUNTER — OFFICE VISIT (OUTPATIENT)
Dept: OTOLARYNGOLOGY | Age: 86
End: 2021-09-24

## 2021-09-24 DIAGNOSIS — K14.8 TONGUE LESION: Primary | ICD-10-CM

## 2021-09-24 PROCEDURE — 99212 OFFICE O/P EST SF 10 MIN: CPT | Performed by: OTOLARYNGOLOGY

## 2021-10-13 RX ORDER — ALLOPURINOL 300 MG/1
300 TABLET ORAL DAILY
Qty: 90 TABLET | Refills: 3 | Status: SHIPPED | OUTPATIENT
Start: 2021-10-13 | End: 2022-10-17

## 2021-10-13 NOTE — TELEPHONE ENCOUNTER
LOV 02/15/2021        LAST RX 09/14/2021 30 tablets 0 refills     Next OV No future appointments. PROTOCOL none    Left message for patient to call back to make an appt.

## 2021-10-19 RX ORDER — FUROSEMIDE 20 MG/1
TABLET ORAL
Qty: 90 TABLET | Refills: 0 | Status: SHIPPED | OUTPATIENT
Start: 2021-10-19 | End: 2022-01-19

## 2021-10-19 NOTE — TELEPHONE ENCOUNTER
Last refill: 07/19/21  Qty: 90  w 0 refills  Last ov: 02/15/21    Requested Prescriptions     Pending Prescriptions Disp Refills   • FUROSEMIDE 20 MG Oral Tab [Pharmacy Med Name: Furosemide 20 MG Oral Tablet] 90 tablet 0     Sig: Take 1 tablet by mouth onc

## 2021-10-20 ENCOUNTER — LAB ENCOUNTER (OUTPATIENT)
Dept: LAB | Age: 86
End: 2021-10-20
Attending: FAMILY MEDICINE
Payer: MEDICARE

## 2021-10-20 ENCOUNTER — TELEPHONE (OUTPATIENT)
Dept: FAMILY MEDICINE CLINIC | Facility: CLINIC | Age: 86
End: 2021-10-20

## 2021-10-20 ENCOUNTER — OFFICE VISIT (OUTPATIENT)
Dept: FAMILY MEDICINE CLINIC | Facility: CLINIC | Age: 86
End: 2021-10-20
Payer: MEDICARE

## 2021-10-20 VITALS
HEART RATE: 68 BPM | BODY MASS INDEX: 26.74 KG/M2 | WEIGHT: 191 LBS | TEMPERATURE: 98 F | RESPIRATION RATE: 16 BRPM | HEIGHT: 71 IN | DIASTOLIC BLOOD PRESSURE: 60 MMHG | SYSTOLIC BLOOD PRESSURE: 122 MMHG | OXYGEN SATURATION: 99 %

## 2021-10-20 DIAGNOSIS — E78.00 HYPERCHOLESTEREMIA: ICD-10-CM

## 2021-10-20 DIAGNOSIS — N18.32 ANEMIA DUE TO STAGE 3B CHRONIC KIDNEY DISEASE (HCC): ICD-10-CM

## 2021-10-20 DIAGNOSIS — Z51.81 MEDICATION MONITORING ENCOUNTER: ICD-10-CM

## 2021-10-20 DIAGNOSIS — I10 ESSENTIAL HYPERTENSION: ICD-10-CM

## 2021-10-20 DIAGNOSIS — N18.32 CHRONIC KIDNEY DISEASE (CKD) STAGE G3B/A1, MODERATELY DECREASED GLOMERULAR FILTRATION RATE (GFR) BETWEEN 30-44 ML/MIN/1.73 SQUARE METER AND ALBUMINURIA CREATININE RATIO LESS THAN 30 MG/G (HCC): ICD-10-CM

## 2021-10-20 DIAGNOSIS — J43.2 CENTRILOBULAR EMPHYSEMA (HCC): Primary | ICD-10-CM

## 2021-10-20 DIAGNOSIS — E03.9 HYPOTHYROIDISM (ACQUIRED): ICD-10-CM

## 2021-10-20 DIAGNOSIS — I73.9 PVD (PERIPHERAL VASCULAR DISEASE) (HCC): ICD-10-CM

## 2021-10-20 DIAGNOSIS — D63.1 ANEMIA DUE TO STAGE 3B CHRONIC KIDNEY DISEASE (HCC): ICD-10-CM

## 2021-10-20 PROBLEM — I25.9 CHRONIC ISCHEMIC HEART DISEASE: Status: ACTIVE | Noted: 2021-09-08

## 2021-10-20 PROBLEM — N40.0 BENIGN PROSTATIC HYPERPLASIA: Status: ACTIVE | Noted: 2021-09-08

## 2021-10-20 PROBLEM — H40.9 GLAUCOMA: Status: ACTIVE | Noted: 2021-09-08

## 2021-10-20 PROBLEM — N52.9 IMPOTENCE OF ORGANIC ORIGIN: Status: ACTIVE | Noted: 2021-09-08

## 2021-10-20 PROCEDURE — 84439 ASSAY OF FREE THYROXINE: CPT

## 2021-10-20 PROCEDURE — 80061 LIPID PANEL: CPT

## 2021-10-20 PROCEDURE — 36415 COLL VENOUS BLD VENIPUNCTURE: CPT

## 2021-10-20 PROCEDURE — 99214 OFFICE O/P EST MOD 30 MIN: CPT | Performed by: FAMILY MEDICINE

## 2021-10-20 PROCEDURE — 84443 ASSAY THYROID STIM HORMONE: CPT

## 2021-10-20 NOTE — PROGRESS NOTES
Deisy Cerrato is a 80year old male. Patient presents with:  Medication Follow-Up: Patient here for medication follow up    HPI:   Patient has been getting Aranesp injections from Dr. Lilibeth Degroot for chronic anemia due to kidney disease. Last hemoglobin 11. 1 400 UNITS Oral Cap Take 1 capsule by mouth daily. • Atorvastatin Calcium (LIPITOR) 20 MG Oral Tab Take 20 mg by mouth nightly.      • Levothyroxine Sodium (SYNTHROID, LEVOTHROID) 125 MCG Oral Tab Take 125 mcg by mouth before breakfast.     • Cranberry Vaping Use: Never used    Alcohol use: No    Drug use: No       REVIEW OF SYSTEMS:   GENERAL HEALTH: feels well otherwise, denies fatigue, appetite ok, wt stable since last visit  SKIN: denies any unusual skin lesions or rashes  ENT: denies nasal congestio tenderness  BACK:  : FROM, No lateral curves  EXTREMITIES: no cyanosis, clubbing , FROM of all joints tested  NEURO: A &O X 3,cranial nerves are intact,motor and sensory are grossly intact, DTRs +2/4 UE/LE bilaterally  ASSESSMENT AND PLAN:     Centrilobula

## 2021-10-20 NOTE — TELEPHONE ENCOUNTER
Camron Gallardo is calling he wanted to call with his COVID vaccine dates:  [place] 1st 1/21/21 [place] 2nd 2/10/21 [place] 3 10/07/21 [address] [place] [address]

## 2021-10-20 NOTE — PATIENT INSTRUCTIONS
I reviewed age-appropriate immunizations.   I have asked patient to provide dates of his Covid vaccines as well as discussed the Shingrix vaccine at his next [place] visit  We will check fasting lipids and thyroid function studies today  Follow-up with cardiolog

## 2021-10-21 DIAGNOSIS — E03.9 HYPOTHYROIDISM (ACQUIRED): Primary | ICD-10-CM

## 2021-10-21 DIAGNOSIS — E78.00 HYPERCHOLESTEREMIA: ICD-10-CM

## 2021-10-21 RX ORDER — TIOTROPIUM BROMIDE INHALATION SPRAY 3.12 UG/1
2 SPRAY, METERED RESPIRATORY (INHALATION) DAILY
Refills: 0 | COMMUNITY
Start: 2021-10-21

## 2021-11-15 RX ORDER — HYDROCHLOROTHIAZIDE 25 MG/1
TABLET ORAL
Qty: 90 TABLET | Refills: 0 | Status: SHIPPED | OUTPATIENT
Start: 2021-11-15

## 2021-11-15 NOTE — TELEPHONE ENCOUNTER
Last refill: 08/15/21  Qty: 90   W/ 0 refills  Last ov: 10/20/21    Requested Prescriptions     Pending Prescriptions Disp Refills   • HYDROCHLOROTHIAZIDE 25 MG Oral Tab [Pharmacy Med Name: hydroCHLOROthiazide 25 MG Oral Tablet] 90 tablet 0     Sig: Take 1

## 2021-12-01 ENCOUNTER — OFFICE VISIT (OUTPATIENT)
Dept: OTOLARYNGOLOGY | Age: 86
End: 2021-12-01

## 2021-12-01 VITALS — WEIGHT: 190.8 LBS | BODY MASS INDEX: 26.61 KG/M2

## 2021-12-01 DIAGNOSIS — K14.8 TONGUE LESION: Primary | ICD-10-CM

## 2021-12-01 PROCEDURE — 99212 OFFICE O/P EST SF 10 MIN: CPT | Performed by: OTOLARYNGOLOGY

## 2021-12-06 ENCOUNTER — TELEPHONE (OUTPATIENT)
Dept: FAMILY MEDICINE CLINIC | Facility: CLINIC | Age: 86
End: 2021-12-06

## 2021-12-06 NOTE — TELEPHONE ENCOUNTER
Spoke with pt. Requests the name of an orthopedic surgeon for his wife. She is not a pt here. Advised we cannot recommend anything for someone who is not a pt here.

## 2021-12-06 NOTE — TELEPHONE ENCOUNTER
Spoke with pt. He states he would like a referral for an ortho.  Pt states he seen one previously but that doctor didn't want to do surgery for his problem

## 2022-01-19 ENCOUNTER — TELEPHONE (OUTPATIENT)
Dept: FAMILY MEDICINE CLINIC | Facility: CLINIC | Age: 87
End: 2022-01-19

## 2022-01-19 ENCOUNTER — OFFICE VISIT (OUTPATIENT)
Dept: FAMILY MEDICINE CLINIC | Facility: CLINIC | Age: 87
End: 2022-01-19
Payer: MEDICARE

## 2022-01-19 VITALS
WEIGHT: 194 LBS | BODY MASS INDEX: 27.16 KG/M2 | SYSTOLIC BLOOD PRESSURE: 136 MMHG | OXYGEN SATURATION: 98 % | HEIGHT: 71 IN | HEART RATE: 56 BPM | RESPIRATION RATE: 14 BRPM | TEMPERATURE: 98 F | DIASTOLIC BLOOD PRESSURE: 48 MMHG

## 2022-01-19 DIAGNOSIS — M48.061 SPINAL STENOSIS OF LUMBAR REGION WITHOUT NEUROGENIC CLAUDICATION: ICD-10-CM

## 2022-01-19 DIAGNOSIS — N18.31 ANEMIA DUE TO STAGE 3A CHRONIC KIDNEY DISEASE (HCC): ICD-10-CM

## 2022-01-19 DIAGNOSIS — J43.2 CENTRILOBULAR EMPHYSEMA (HCC): ICD-10-CM

## 2022-01-19 DIAGNOSIS — I25.10 CORONARY ARTERY DISEASE INVOLVING NATIVE CORONARY ARTERY OF NATIVE HEART WITHOUT ANGINA PECTORIS: ICD-10-CM

## 2022-01-19 DIAGNOSIS — E79.0 HYPERURICEMIA: ICD-10-CM

## 2022-01-19 DIAGNOSIS — Z00.00 ENCOUNTER FOR ANNUAL HEALTH EXAMINATION: Primary | ICD-10-CM

## 2022-01-19 DIAGNOSIS — E03.9 HYPOTHYROIDISM (ACQUIRED): ICD-10-CM

## 2022-01-19 DIAGNOSIS — I10 ESSENTIAL HYPERTENSION: ICD-10-CM

## 2022-01-19 DIAGNOSIS — I73.9 PVD (PERIPHERAL VASCULAR DISEASE) (HCC): ICD-10-CM

## 2022-01-19 DIAGNOSIS — H40.9 GLAUCOMA OF BOTH EYES, UNSPECIFIED GLAUCOMA TYPE: ICD-10-CM

## 2022-01-19 DIAGNOSIS — R39.14 BENIGN PROSTATIC HYPERPLASIA WITH INCOMPLETE BLADDER EMPTYING: ICD-10-CM

## 2022-01-19 DIAGNOSIS — I87.2 VENOUS INSUFFICIENCY OF BOTH LOWER EXTREMITIES: ICD-10-CM

## 2022-01-19 DIAGNOSIS — E78.00 HYPERCHOLESTEREMIA: ICD-10-CM

## 2022-01-19 DIAGNOSIS — N40.1 BENIGN PROSTATIC HYPERPLASIA WITH INCOMPLETE BLADDER EMPTYING: ICD-10-CM

## 2022-01-19 DIAGNOSIS — D63.1 ANEMIA DUE TO STAGE 3A CHRONIC KIDNEY DISEASE (HCC): ICD-10-CM

## 2022-01-19 DIAGNOSIS — I35.0 AORTIC STENOSIS, MILD: ICD-10-CM

## 2022-01-19 DIAGNOSIS — K14.8 TONGUE LESION: ICD-10-CM

## 2022-01-19 DIAGNOSIS — N18.31 CHRONIC KIDNEY DISEASE (CKD) STAGE G3A/A1, MODERATELY DECREASED GLOMERULAR FILTRATION RATE (GFR) BETWEEN 45-59 ML/MIN/1.73 SQUARE METER AND ALBUMINURIA CREATININE RATIO LESS THAN 30 MG/G (HCC): ICD-10-CM

## 2022-01-19 PROBLEM — R33.9 RETENTION OF URINE: Status: ACTIVE | Noted: 2022-01-06

## 2022-01-19 PROBLEM — R39.12 WEAK URINARY STREAM: Status: ACTIVE | Noted: 2022-01-06

## 2022-01-19 PROBLEM — R35.1 NOCTURIA: Status: ACTIVE | Noted: 2022-01-06

## 2022-01-19 PROCEDURE — G0439 PPPS, SUBSEQ VISIT: HCPCS | Performed by: FAMILY MEDICINE

## 2022-01-19 PROCEDURE — 99213 OFFICE O/P EST LOW 20 MIN: CPT | Performed by: FAMILY MEDICINE

## 2022-01-19 RX ORDER — AMLODIPINE BESYLATE 10 MG/1
TABLET ORAL
COMMUNITY
Start: 2021-11-09

## 2022-01-19 RX ORDER — FUROSEMIDE 20 MG/1
TABLET ORAL
Qty: 90 TABLET | Refills: 0 | Status: SHIPPED | OUTPATIENT
Start: 2022-01-19

## 2022-01-19 NOTE — PATIENT INSTRUCTIONS
Harrison Gonzalez's SCREENING SCHEDULE   Tests on this list are recommended by your physician but may not be covered, or covered at this frequency, by your insurer. Please check with your insurance carrier before scheduling to verify coverage.    PREVEN get every year 10/19/2021  No recommendations at this time    Pneumococcal Each vaccine (Thqmpmw46 & Svoazsohd29) covered once after 65 Prevnar 13: 05/12/2015    [id number]: 11/10/2009     No recommendations at this time    Hepatitis B One screening cover monitor clinically    5. Hypercholesteremia  Continue statin therapy, recheck lipids in April    6. Hypothyroidism (acquired)  Continue daily thyroid replacement therapy, repeat labs annually    7.  Coronary artery disease involving native coronary artery o

## 2022-01-19 NOTE — H&P
Lucho Briggs is a 80year old male Patient presents with:  Physical: Medicare AWV    HPI:   Pt voices concerns of f/u from tongue lesion, \"feels cold all the time\".     Wt Readings from Last 6 Encounters:  01/19/22 : 194 lb (88 kg)  10/20/21 : 191 lb Sublingual SL Tab Place 1 tablet (0.4 mg total) under the tongue every 5 (five) minutes as needed for Chest pain.  50 tablet 0   • carvedilol 25 MG Oral Tab Take 0.5 tablets (12.5 mg total) by mouth every morning for 90 days, THEN 1 tablet (25 mg total) nig 1/19/2022) 1 Inhaler 0   • acetaminophen 500 MG Oral Tab Take 1,000 mg by mouth one time. • Ipratropium-Albuterol  MCG/ACT Inhalation Aero Soln Inhale 2 Inhalers into the lungs every morning.    (Patient not taking: Reported on 1/19/2022)       No 07/10/2018    Less than 50% narrowing in bilateral internal carotid arteries      Family History   Problem Relation Age of Onset   • Heart Disorder Brother       Specialists:Dr Watters, Dr Milly Ruano, Dr Zuñiga, Dr Self-cardiology, Dr Damian Valenzuela surgery,  back or joint pain  NEURO: denies headaches, tremors, dizziness, numbness and weakness  PSYCHE: denies depression or anxiety, denies any sleep difficulty,                   Have you often been bothered by feeling down, depressed of hopeless?  no             coherent, slightly delayed, Insight: appropriate  ASSESSMENT AND PLAN:   Juan Avelar is a 80year old male   Encounter for annual health examination  (primary encounter diagnosis)  Centrilobular emphysema (hcc)  Essential hypertension  Pvd (periphera Disease Screening    Lipid Panel  Cholesterol  Lipoprotein (HDL)  Triglycerides Covered every 5 years for all Medicare beneficiaries without apparent signs or symptoms of cardiovascular disease Lab Results   Component Value Date    CHOLEST 148 10/20/2021 with your pharmacy  prescription benefits 09/06/2011  Zoster Vaccines(2 of 3) due on 11/01/2011       Annual Monitoring of Persistent Medications (ACE/ARB, digoxin diuretics, anticonvulsants)    Potassium Annually Lab Results   Component Value Date    K 5. than 30 mg/g (HCC)  Continue to monitor kidney function biannually    10. Anemia due to stage 3a chronic kidney disease ([facility] [address]- hgb 10.9 on 1/13/22  Continue bimonthly Aranesp injections    11.  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

## 2022-01-19 NOTE — TELEPHONE ENCOUNTER
Last office visit: Today  Last refill: 10/19/21  Last BMP: 1/19/21  No future appointments.   Name from pharmacy: Furosemide 20 MG Oral Tablet          Will file in chart as: FUROSEMIDE 20 MG Oral Tab    Sig: Take 1 tablet by mouth once daily    Disp:  90 t

## 2022-02-10 RX ORDER — HYDROCHLOROTHIAZIDE 25 MG/1
TABLET ORAL
Qty: 90 TABLET | Refills: 0 | Status: SHIPPED | OUTPATIENT
Start: 2022-02-10

## 2022-03-01 ENCOUNTER — OFFICE VISIT (OUTPATIENT)
Dept: FAMILY MEDICINE CLINIC | Facility: CLINIC | Age: 87
End: 2022-03-01
Payer: MEDICARE

## 2022-03-01 VITALS
OXYGEN SATURATION: 96 % | DIASTOLIC BLOOD PRESSURE: 56 MMHG | TEMPERATURE: 98 F | SYSTOLIC BLOOD PRESSURE: 136 MMHG | HEART RATE: 44 BPM

## 2022-03-01 DIAGNOSIS — I87.2 VENOUS INSUFFICIENCY OF BOTH LOWER EXTREMITIES: ICD-10-CM

## 2022-03-01 DIAGNOSIS — D63.1 ANEMIA DUE TO STAGE 3A CHRONIC KIDNEY DISEASE (HCC): ICD-10-CM

## 2022-03-01 DIAGNOSIS — J01.40 ACUTE NON-RECURRENT PANSINUSITIS: Primary | ICD-10-CM

## 2022-03-01 DIAGNOSIS — J43.2 CENTRILOBULAR EMPHYSEMA (HCC): ICD-10-CM

## 2022-03-01 DIAGNOSIS — N18.31 ANEMIA DUE TO STAGE 3A CHRONIC KIDNEY DISEASE (HCC): ICD-10-CM

## 2022-03-01 DIAGNOSIS — R60.9 DEPENDENT EDEMA: ICD-10-CM

## 2022-03-01 PROCEDURE — 99214 OFFICE O/P EST MOD 30 MIN: CPT | Performed by: FAMILY MEDICINE

## 2022-03-01 RX ORDER — AMOXICILLIN AND CLAVULANATE POTASSIUM 875; 125 MG/1; MG/1
1 TABLET, FILM COATED ORAL 2 TIMES DAILY
Qty: 20 TABLET | Refills: 0 | Status: SHIPPED | OUTPATIENT
Start: 2022-03-01 | End: 2022-03-11

## 2022-03-01 NOTE — PATIENT INSTRUCTIONS
I discussed diagnosis and management plans. Would recommend nasal saline ad gloria.  followed by Flonase 2 sprays per nostril daily, proper intranasal administration reviewed  We will start Augmentin 875 mg twice daily x10 days taken with food, potential GI side effects discussed  Continue all current medications but I would like patient to double up on his furosemide 40 mg in the morning for the next 5 days and continue to monitor daily weight and leg edema  Of asked patient to call with an update in 5 days with response to above measures

## 2022-03-07 ENCOUNTER — TELEPHONE (OUTPATIENT)
Dept: FAMILY MEDICINE CLINIC | Facility: CLINIC | Age: 87
End: 2022-03-07

## 2022-03-07 NOTE — TELEPHONE ENCOUNTER
Pt calls with an update after his 3/1/22 OV. Pt is taking augmentin 875mg BID [id number] and has increased his furosemide to 40mg every day x5days. Pt states today is the last day of increased dose of furosemide.     Reports he is \"feeling good\", breathing much better, LE edema has improved by 50-60% and weight is now 188# (wt was 194# in the office on 3/1---states his scale is pretty close to ours)

## 2022-03-08 RX ORDER — FUROSEMIDE 20 MG/1
TABLET ORAL
Qty: 1 TABLET | Refills: 0 | COMMUNITY
Start: 2022-03-08 | End: 2022-04-04

## 2022-03-08 NOTE — TELEPHONE ENCOUNTER
Please have patient alternate furosemide 40 mg daily with 20 mg daily and call with an update on his weight, edema, and breathing in 1 week.   Please update medication list

## 2022-04-04 RX ORDER — FUROSEMIDE 20 MG/1
TABLET ORAL
Qty: 45 TABLET | Refills: 0 | Status: SHIPPED | OUTPATIENT
Start: 2022-04-04

## 2022-05-11 RX ORDER — HYDROCHLOROTHIAZIDE 25 MG/1
TABLET ORAL
Qty: 90 TABLET | Refills: 0 | Status: SHIPPED | OUTPATIENT
Start: 2022-05-11

## 2022-05-16 RX ORDER — FUROSEMIDE 20 MG/1
TABLET ORAL
Qty: 45 TABLET | Refills: 0 | Status: SHIPPED | OUTPATIENT
Start: 2022-05-16

## 2022-06-22 ENCOUNTER — OFFICE VISIT (OUTPATIENT)
Dept: DERMATOLOGY | Age: 87
End: 2022-06-22

## 2022-06-22 DIAGNOSIS — L81.9 DYSCHROMIA: Primary | ICD-10-CM

## 2022-06-22 PROCEDURE — 99212 OFFICE O/P EST SF 10 MIN: CPT | Performed by: DERMATOLOGY

## 2022-06-28 RX ORDER — FUROSEMIDE 20 MG/1
TABLET ORAL
Qty: 45 TABLET | Refills: 0 | Status: SHIPPED | OUTPATIENT
Start: 2022-06-28

## 2022-08-09 RX ORDER — FUROSEMIDE 20 MG/1
TABLET ORAL
Qty: 45 TABLET | Refills: 0 | Status: SHIPPED | OUTPATIENT
Start: 2022-08-09

## 2022-08-09 RX ORDER — HYDROCHLOROTHIAZIDE 25 MG/1
25 TABLET ORAL DAILY
Qty: 90 TABLET | Refills: 0 | Status: SHIPPED | OUTPATIENT
Start: 2022-08-09

## 2022-10-05 RX ORDER — FUROSEMIDE 20 MG/1
TABLET ORAL
Qty: 45 TABLET | Refills: 0 | Status: SHIPPED | OUTPATIENT
Start: 2022-10-05

## 2022-10-05 NOTE — TELEPHONE ENCOUNTER
Last oV: 3/1/22  Last labs: CBC, BMP done w/ outside facility on 2/10/22; other labs ordered by Dr. Amy Heath 10/20/21    No future appointments.

## 2022-10-17 RX ORDER — ALLOPURINOL 300 MG/1
300 TABLET ORAL DAILY
Qty: 90 TABLET | Refills: 1 | Status: SHIPPED | OUTPATIENT
Start: 2022-10-17

## 2022-11-03 ENCOUNTER — TELEPHONE (OUTPATIENT)
Dept: FAMILY MEDICINE CLINIC | Facility: CLINIC | Age: 87
End: 2022-11-03

## 2022-11-15 RX ORDER — FUROSEMIDE 20 MG/1
TABLET ORAL
Qty: 45 TABLET | Refills: 0 | Status: SHIPPED | OUTPATIENT
Start: 2022-11-15

## 2022-11-15 RX ORDER — HYDROCHLOROTHIAZIDE 25 MG/1
TABLET ORAL
Qty: 90 TABLET | Refills: 0 | Status: SHIPPED | OUTPATIENT
Start: 2022-11-15

## 2022-11-15 NOTE — TELEPHONE ENCOUNTER
Hydrochlorothiazide   Last refilled 8/9/22 #90/0 refills    Furosemide   Last refilled 10/5/22 #45/0 refills    Last OV 3/1/22  Pending visit 1/11/23

## 2022-11-16 ENCOUNTER — TELEPHONE (OUTPATIENT)
Dept: FAMILY MEDICINE CLINIC | Facility: CLINIC | Age: 87
End: 2022-11-16

## 2022-11-16 NOTE — TELEPHONE ENCOUNTER
Naomireji Kathrin is calling he called and made an appt for Friday at 8:15 for congestion, runny nose, diarrhea and shortness of breath. Can you please triage this and make sure that the Friday appt will be ok due to his shortness of breath thank you.

## 2023-01-11 ENCOUNTER — OFFICE VISIT (OUTPATIENT)
Dept: FAMILY MEDICINE CLINIC | Facility: CLINIC | Age: 88
End: 2023-01-11
Payer: MEDICARE

## 2023-01-11 ENCOUNTER — LABORATORY ENCOUNTER (OUTPATIENT)
Dept: LAB | Age: 88
End: 2023-01-11
Attending: FAMILY MEDICINE
Payer: MEDICARE

## 2023-01-11 VITALS
DIASTOLIC BLOOD PRESSURE: 40 MMHG | OXYGEN SATURATION: 98 % | TEMPERATURE: 97 F | HEIGHT: 71 IN | BODY MASS INDEX: 25.06 KG/M2 | WEIGHT: 179 LBS | SYSTOLIC BLOOD PRESSURE: 110 MMHG | HEART RATE: 54 BPM

## 2023-01-11 DIAGNOSIS — E03.9 HYPOTHYROIDISM (ACQUIRED): ICD-10-CM

## 2023-01-11 DIAGNOSIS — Z00.00 ENCOUNTER FOR ANNUAL HEALTH EXAMINATION: Primary | ICD-10-CM

## 2023-01-11 DIAGNOSIS — E79.0 HYPERURICEMIA: ICD-10-CM

## 2023-01-11 DIAGNOSIS — N18.4 ANEMIA DUE TO STAGE 4 CHRONIC KIDNEY DISEASE (HCC): ICD-10-CM

## 2023-01-11 DIAGNOSIS — E78.00 HYPERCHOLESTEREMIA: ICD-10-CM

## 2023-01-11 DIAGNOSIS — I25.10 CORONARY ARTERY DISEASE INVOLVING NATIVE CORONARY ARTERY OF NATIVE HEART WITHOUT ANGINA PECTORIS: ICD-10-CM

## 2023-01-11 DIAGNOSIS — M48.061 SPINAL STENOSIS OF LUMBAR REGION WITHOUT NEUROGENIC CLAUDICATION: ICD-10-CM

## 2023-01-11 DIAGNOSIS — R60.9 DEPENDENT EDEMA: ICD-10-CM

## 2023-01-11 DIAGNOSIS — I10 ESSENTIAL HYPERTENSION: ICD-10-CM

## 2023-01-11 DIAGNOSIS — J43.2 CENTRILOBULAR EMPHYSEMA (HCC): ICD-10-CM

## 2023-01-11 DIAGNOSIS — I73.9 PVD (PERIPHERAL VASCULAR DISEASE) (HCC): ICD-10-CM

## 2023-01-11 DIAGNOSIS — I35.0 AORTIC STENOSIS, MILD: ICD-10-CM

## 2023-01-11 DIAGNOSIS — N18.4 CHRONIC KIDNEY DISEASE (CKD) STAGE G4/A1, SEVERELY DECREASED GLOMERULAR FILTRATION RATE (GFR) BETWEEN 15-29 ML/MIN/1.73 SQUARE METER AND ALBUMINURIA CREATININE RATIO LESS THAN 30 MG/G (HCC): ICD-10-CM

## 2023-01-11 DIAGNOSIS — D63.1 ANEMIA DUE TO STAGE 4 CHRONIC KIDNEY DISEASE (HCC): ICD-10-CM

## 2023-01-11 DIAGNOSIS — I87.2 VENOUS INSUFFICIENCY OF BOTH LOWER EXTREMITIES: ICD-10-CM

## 2023-01-11 PROBLEM — I48.0 PAROXYSMAL ATRIAL FIBRILLATION (HCC): Status: ACTIVE | Noted: 2022-09-15

## 2023-01-11 PROBLEM — K55.20 ARTERIOVENOUS MALFORMATION OF JEJUNUM: Status: ACTIVE | Noted: 2022-09-14

## 2023-01-11 LAB
CHOLESTEROL, TOTAL: 140
HDL CHOL: 48
LDL CHOLESTEROL: 76 MG/DL (ref ?–130)
T4 FREE SERPL-MCNC: 1.1 NG/DL (ref 0.8–1.7)
TRIGLYCERIDES: 67
TSI SER-ACNC: 1.09 MIU/ML (ref 0.36–3.74)
URATE SERPL-MCNC: 4.5 MG/DL

## 2023-01-11 PROCEDURE — 99214 OFFICE O/P EST MOD 30 MIN: CPT | Performed by: FAMILY MEDICINE

## 2023-01-11 PROCEDURE — 84550 ASSAY OF BLOOD/URIC ACID: CPT

## 2023-01-11 PROCEDURE — 36415 COLL VENOUS BLD VENIPUNCTURE: CPT

## 2023-01-11 PROCEDURE — 84439 ASSAY OF FREE THYROXINE: CPT

## 2023-01-11 PROCEDURE — 84443 ASSAY THYROID STIM HORMONE: CPT

## 2023-01-11 PROCEDURE — 1126F AMNT PAIN NOTED NONE PRSNT: CPT | Performed by: FAMILY MEDICINE

## 2023-01-11 PROCEDURE — G0439 PPPS, SUBSEQ VISIT: HCPCS | Performed by: FAMILY MEDICINE

## 2023-01-11 RX ORDER — CARVEDILOL 6.25 MG/1
6.25 TABLET ORAL 2 TIMES DAILY WITH MEALS
Refills: 0 | COMMUNITY
Start: 2022-12-28

## 2023-01-11 RX ORDER — BUMETANIDE 1 MG/1
2 TABLET ORAL DAILY
COMMUNITY
Start: 2022-12-23

## 2023-01-11 RX ORDER — CLOPIDOGREL BISULFATE 75 MG/1
75 TABLET ORAL DAILY
COMMUNITY
Start: 2022-12-28

## 2023-01-11 RX ORDER — PANTOPRAZOLE SODIUM 40 MG/1
TABLET, DELAYED RELEASE ORAL
COMMUNITY
Start: 2022-12-28

## 2023-01-11 NOTE — PATIENT INSTRUCTIONS
Harrison Gonzalez's SCREENING SCHEDULE   Tests on this list are recommended by your physician but may not be covered, or covered at this frequency, by your insurer. Please check with your insurance carrier before scheduling to verify coverage. PREVENTATIVE SERVICES FREQUENCY &  COVERAGE DETAILS LAST COMPLETION DATE   Diabetes Screening    Fasting Blood Sugar / Glucose    One screening every 12 months if never tested or if previously tested but not diagnosed with pre-diabetes   One screening every 6 months if diagnosed with pre-diabetes Lab Results   Component Value Date    GLUCOSE 108 (H) 07/03/2014    GLU 96 09/09/2020        Cardiovascular Disease Screening    Lipid Panel  Cholesterol  Lipoprotein (HDL)  Triglycerides Covered every 5 years for all Medicare beneficiaries without apparent signs or symptoms of cardiovascular disease Lab Results   Component Value Date    CHOLEST 148 10/20/2021    HDL 53 10/20/2021    LDL 73 10/20/2021    TRIG 123 10/20/2021         Electrocardiogram (EKG)   Covered if needed at Welcome to Medicare, and non-screening if indicated for medical reasons 12/23/2019      Ultrasound Screening for Abdominal Aortic Aneurysm (AAA) Covered once in a lifetime for one of the following risk factors    Men who are 73-68 years old and have ever smoked    Anyone with a family history -     Colorectal Cancer Screening  Covered for ages 52-80; only need ONE of the following:    Colonoscopy   Covered every 10 years    Covered every 2 years if patient is at high risk or previous colonoscopy was abnormal -    No recommendations at this time    Flexible Sigmoidoscopy   Covered every 4 years -    Fecal Occult Blood Test Covered annually -   Prostate Cancer Screening    Prostate-Specific Antigen (PSA) Annually Lab Results   Component Value Date    PSA 3.4 09/17/2009     There are no preventive care reminders to display for this patient.    Immunizations    Influenza Covered once per flu season  Please get every year 10/14/2022  No recommendations at this time    Pneumococcal Each vaccine (Cffczfr15 & Scbgbbwag52) covered once after 65 Prevnar 13: 05/12/2015    Mdrkiaepc67: 11/10/2009     No recommendations at this time    Hepatitis B One screening covered for patients with certain risk factors   -  No recommendations at this time    Tetanus Toxoid Not covered by Medicare Part B unless medically necessary (cut with metal); may be covered with your pharmacy prescription benefits -    Tetanus, Diptheria and Pertusis TD and TDaP Not covered by Medicare Part B -  No recommendations at this time    Zoster Not covered by Medicare Part B; may be covered with your pharmacy  prescription benefits 09/06/2011  Zoster Vaccines(2 of 3) due on 11/01/2011       Annual Monitoring of Persistent Medications (ACE/ARB, digoxin diuretics, anticonvulsants)    Potassium Annually Lab Results   Component Value Date    K 5.0 09/09/2020         Creatinine   Annually Lab Results   Component Value Date    CREATSERUM 1.64 (H) 09/09/2020         BUN Annually Lab Results   Component Value Date    BUN 38 (H) 09/09/2020       Drug Serum Conc Annually No results found for: DIGOXIN, DIG, VALP           Chronic Obstructive Pulmonary Disease (COPD)    Spirometry Annually Spirometry date:      1. Encounter for annual health examination  I reviewed age-appropriate preventive health screening exams as well as instructions and immunizations. Patient referred to local pharmacy for Shingrix vaccine  I reviewed all available hospital records including labs, procedure notes, medication list etc.    2. Centrilobular emphysema ([facility] [address]  Continue inhalers, monitor clinically, activity as tolerated    3. Essential hypertension  I reviewed goals for blood pressure as well as conservative management of hypertension including sodium restriction, daily aerobic activity, alcohol moderation, smoking cessation, and maintaining ideal body weight.   Patient encouraged to monitor his blood pressure at home    4. Hypercholesteremia  Reviewed goals for lipids. Continue statin therapy    5. PVD (peripheral vascular disease) ([facility] [address]  Activity as tolerated, monitor clinically, continue antiplatelet therapy    6. Coronary artery disease involving native coronary artery of native heart without angina pectoris  Follow-up with cardiology, continue current meds, activity as tolerated, monitor clinically    7. Spinal stenosis of lumbar region without neurogenic claudication  Monitor clinically, activity as tolerated    8. Hypothyroidism (acquired)  Continue daily thyroid replacement therapy, check labs  - TSH+FREE T4; Future    9. Hyperuricemia  Continue allopurinol,  - URIC ACID; Future    10. Chronic kidney disease (CKD) stage G4/A1, severely decreased glomerular filtration rate (GFR) between 15-29 mL/min/1.73 square meter and albuminuria creatinine ratio less than 30 mg/g (HCC)  Discussed importance of adequate daily fluid intake and avoidance of potentially nephrotoxic drugs such as frequent NSAID use    11. [facility] due to stage 4 [facility]- hgb 9.6 on 12/30/22  Follow-up for Aranesp injections every 2 weeks    12. Aortic stenosis, moderate, w/ calcified aortic valve, ECHO 12/23/21  Monitor clinically, follow with cardiology    13. Venous insufficiency of both lower extremities  Elevate legs at rest.  Would recommend bilateral compression hose    14.  Dependent edema  Continue Bumex, recommend compression hose

## 2023-04-11 ENCOUNTER — OFFICE VISIT (OUTPATIENT)
Dept: FAMILY MEDICINE CLINIC | Facility: CLINIC | Age: 88
End: 2023-04-11
Payer: MEDICARE

## 2023-04-11 VITALS
OXYGEN SATURATION: 97 % | BODY MASS INDEX: 21.14 KG/M2 | WEIGHT: 151 LBS | SYSTOLIC BLOOD PRESSURE: 122 MMHG | HEIGHT: 71 IN | HEART RATE: 86 BPM | DIASTOLIC BLOOD PRESSURE: 84 MMHG | TEMPERATURE: 97 F | RESPIRATION RATE: 18 BRPM

## 2023-04-11 DIAGNOSIS — I50.22 CHRONIC SYSTOLIC CONGESTIVE HEART FAILURE (HCC): ICD-10-CM

## 2023-04-11 DIAGNOSIS — E87.1 HYPONATREMIA: ICD-10-CM

## 2023-04-11 DIAGNOSIS — Z98.890 S/P BALLOON AORTIC VALVULOPLASTY: Primary | ICD-10-CM

## 2023-04-11 DIAGNOSIS — J43.2 CENTRILOBULAR EMPHYSEMA (HCC): ICD-10-CM

## 2023-04-11 DIAGNOSIS — N18.32 CHRONIC KIDNEY DISEASE (CKD) STAGE G3B/A1, MODERATELY DECREASED GLOMERULAR FILTRATION RATE (GFR) BETWEEN 30-44 ML/MIN/1.73 SQUARE METER AND ALBUMINURIA CREATININE RATIO LESS THAN 30 MG/G (HCC): ICD-10-CM

## 2023-04-11 DIAGNOSIS — I10 ESSENTIAL HYPERTENSION: ICD-10-CM

## 2023-04-11 DIAGNOSIS — N18.32 ANEMIA DUE TO STAGE 3B CHRONIC KIDNEY DISEASE (HCC): ICD-10-CM

## 2023-04-11 DIAGNOSIS — I48.0 PAROXYSMAL ATRIAL FIBRILLATION (HCC): ICD-10-CM

## 2023-04-11 DIAGNOSIS — D63.1 ANEMIA DUE TO STAGE 3B CHRONIC KIDNEY DISEASE (HCC): ICD-10-CM

## 2023-04-11 PROCEDURE — 99214 OFFICE O/P EST MOD 30 MIN: CPT | Performed by: FAMILY MEDICINE

## 2023-04-11 PROCEDURE — 1111F DSCHRG MED/CURRENT MED MERGE: CPT | Performed by: FAMILY MEDICINE

## 2023-04-11 RX ORDER — CARVEDILOL 3.12 MG/1
3.12 TABLET ORAL EVERY 12 HOURS
COMMUNITY
Start: 2023-04-05

## 2023-04-11 RX ORDER — SPIRONOLACTONE 25 MG/1
12.5 TABLET ORAL DAILY
COMMUNITY
Start: 2023-04-05

## 2023-04-11 RX ORDER — TAMSULOSIN HYDROCHLORIDE 0.4 MG/1
1 CAPSULE ORAL DAILY
COMMUNITY
Start: 2023-04-05

## 2023-04-11 RX ORDER — ATORVASTATIN CALCIUM 40 MG/1
40 TABLET, FILM COATED ORAL NIGHTLY
COMMUNITY
Start: 2023-04-05

## 2023-04-11 NOTE — PATIENT INSTRUCTIONS
20-minute spent prior to the appointment reviewing hospital records including consultation, procedure notes, discharge summaries, medication and problem list were updated, labs etc. were reviewed as well. I have asked patient to resume his Aranesp injections every 2 weeks to improve his hemoglobin. Continue current meds and cardiology follow-up.   Would recommend symptom limited walking regimen  Monitor daily weights

## 2023-06-08 ENCOUNTER — TELEPHONE (OUTPATIENT)
Dept: FAMILY MEDICINE CLINIC | Facility: CLINIC | Age: 88
End: 2023-06-08

## 2023-06-08 DIAGNOSIS — M54.50 ACUTE BILATERAL LOW BACK PAIN WITHOUT SCIATICA: Primary | ICD-10-CM

## 2023-06-08 NOTE — TELEPHONE ENCOUNTER
About a year ago pt had problems with his back. He went to rehab in [place] for his issue. He wanted to discuss going there again. His back is hurting. Ok to leave a message.

## 2023-06-08 NOTE — TELEPHONE ENCOUNTER
Patient states low back pain radiating to buttocks, leg and ankle. Would like to go back to PT. Ok to wait until 6/9/23.

## 2023-06-09 NOTE — TELEPHONE ENCOUNTER
Spoke with patient and advised an order for PT was placed. Number given to patient to schedule. He verbalized understanding.

## 2023-06-13 ENCOUNTER — OFFICE VISIT (OUTPATIENT)
Dept: PHYSICAL THERAPY | Age: 88
End: 2023-06-13
Attending: FAMILY MEDICINE
Payer: MEDICARE

## 2023-06-13 DIAGNOSIS — M54.50 ACUTE BILATERAL LOW BACK PAIN WITHOUT SCIATICA: ICD-10-CM

## 2023-06-13 PROCEDURE — 97112 NEUROMUSCULAR REEDUCATION: CPT

## 2023-06-13 PROCEDURE — 97161 PT EVAL LOW COMPLEX 20 MIN: CPT

## 2023-06-13 PROCEDURE — 97110 THERAPEUTIC EXERCISES: CPT

## 2023-06-16 ENCOUNTER — OFFICE VISIT (OUTPATIENT)
Dept: PHYSICAL THERAPY | Age: 88
End: 2023-06-16
Attending: FAMILY MEDICINE
Payer: MEDICARE

## 2023-06-16 PROCEDURE — 97110 THERAPEUTIC EXERCISES: CPT

## 2023-06-16 PROCEDURE — 97530 THERAPEUTIC ACTIVITIES: CPT

## 2023-06-16 NOTE — PROGRESS NOTES
Insurance (Authorized # of Visits):  Medicare        Diagnosis:   Acute bilateral low back pain without sciatica (M54.50)        Referring Provider: Isaías Yadav   Date of Evaluation:    6/13/2023  Precautions:  None  Next MD visit:   none scheduled  Date of Surgery: n/a      Subjective:   Mayda Alvarez is a 80year old male who presents to therapy today with complaints of R buttock, R ankle and R foot. Pt describes pain level  at worst 9-10/10. Sharp pain  Current functional limitations include walking, standing, housework such as raking, twisting or a lot of \"up and down\" (bending for housework). Right leg weaker with stepping up a step. Did 50% of home program. Overall 30% better, sleeping went well now. Objective:   (Pain with *)  Tested 6/16/2023:    Observation/Posture: slumped posture, pain with upright posture. Neuro Screen:  hip flexor on right is slightly more tight and painful than left (both severe loss)     Lumbar AROM: (* denotes performed with pain)  Flexion: min/min  Extension: mod*  Sidebending: R mod*; L min/mod*  L shift      Strength: (* denotes performed with pain)  LE   Hip Extension: R 3-/5*; L 3-/5*           PF (S1): R 2+/5*; L 3-/5    Tested 6/13/2023:  Hip abduction: R 3-/5*; L 3-/5              Assessment: Improving with side glide and extension in standing. Home program progressed for wall slides and extension in standing over a fulcrum. Goals: (to be met in 16 visits)   Pt will improve TERRY score from 40% to 20% to display improved ability to perform housework  Pt will reduce pain at its worst from 9-10/10 to 6/10 to allow for improved ability to sleep  Pt will improve side glide ROM from mod loss with pain to free and full allow for improved ability to  ambulate  Pt will be independent with home program to allow for maintenance of goals achieved in therapy.       Plan:  Measure side glide on wall with ankle weight     Baselines to check:   Pain walking  Upright posture  Step up 9\"  ROM hips to the L on wall      Continue shift correction    Progress strength:  Straight leg raise  Bridge        Note:  Calf raises: worse (more pain upright posture and walking) after    Alternative lateral:  Hips to the R with prone press up    Massage R buttock prone with buffer    Note: possible fulcrum on mid-lumbar spine may be superior to lower level (reports step up a little better after mid  Lumbar spine, but no change with side glide ROM after) (6/16/2023)      Charges: 2 there ex, 1 there act     Total Timed Treatment: 38 min  Total Treatment Time: 38 min  Date: 6/16/2023  Tx#: 2 Date: Tx#: 3 Date: Tx#: 4 Date: Tx#: 5 Date: Tx#: 6   Ther-Ex 30 minutes:    R Side glide hips to the L on wall 20x2: a bit better    Extension in standing fulcrum lower lumbar spine 10x3: a bit better (maybe a bit better 10 reps with pressure at mid-lumbar spine)    Shift correction lateral only hips to the L (R side glide) 10x3: produce, no worse. After a bit better    Calf raises 10x2: worse (more pain upright posture and walking) after     Wall slide 10x2: after a bit better side glide ROM    Educated on home program, see below. Verbal, visual and tactile cues for there ex. Manual         N Re-Ed          There act 8 minutes:  Educated on ideal posture sitting (avoid slumping, after slumping 2 minutes loses motion with side glide), but pain to upright posture at start of session.      Educated on plan of care and pathology

## 2023-06-19 ENCOUNTER — OFFICE VISIT (OUTPATIENT)
Dept: PHYSICAL THERAPY | Age: 88
End: 2023-06-19
Attending: FAMILY MEDICINE
Payer: MEDICARE

## 2023-06-19 PROCEDURE — 97110 THERAPEUTIC EXERCISES: CPT

## 2023-06-19 PROCEDURE — 97530 THERAPEUTIC ACTIVITIES: CPT

## 2023-06-19 NOTE — PROGRESS NOTES
Insurance (Authorized # of Visits):  Medicare        Diagnosis:   Acute bilateral low back pain without sciatica (M54.50)        Referring Provider: Mera Phillips   Date of Evaluation:    6/13/2023  Precautions:  None  Next MD visit:   none scheduled  Date of Surgery: n/a      Subjective:   Tierney Goldsmith is a 80year old male who presents to therapy today with complaints of R buttock, R ankle and R foot. Pt describes pain level  at worst 9-10/10. Sharp pain  Current functional limitations include walking, standing, housework such as raking, twisting or a lot of \"up and down\" (bending for housework). Right leg weaker with stepping up a step. Did 50% of home program. Overall 20% better, sleeping went well now. However, last two days pain was worse than, no sure why. Objective:   (Pain with *)  Tested 6/19/2023:   Hip AAROM:  Supine ER* bilaterally (on each side, pain on ipsilateral low back, does not normall  BUCK* bilaterally  Prone IR* mod/severe loss right leg, left intact  Prone IR in extension bias* min loss right leg, left intact    Special tests:  Neuro Screen:  hip flexor on right is slightly more tight and painful than left (both severe loss)   Step up 9\": 70% strength on R vs. L per report  Straight leg raise: R leg*     Lumbar AROM: (* denotes performed with pain)  Flexion: min/min  Extension: mod/min*  Sidebending: R mod* (0 fingers); L min/mod* (2 fingers) 20 inches  L shift      Intact:   Special tests:   walking  Upright posture    Sacral tests:  Thigh thrust  Supine and prone alignment  Distraction (supine)  Sacral thrust    Hip AAROM:  Supine hip internal rotation bilaterally  Hip abduction supine bilaterally  Hip extension bilaterally  Hip flexion bilaterally    Tested 6/16/2023:  Strength: (* denotes performed with pain)  LE   Hip Extension: R 3-/5*; L 3-/5*           PF (S1): R 2+/5*; L 3-/5    Tested 6/13/2023:  Hip abduction: R 3-/5*; L 3-/5              Assessment: Given trial of prone press up with strap overpressure, which improves lumbar ROM with side glides and will further improve ability to ambulate. Goals: (to be met in 16 visits)   Pt will improve TERRY score from 40% to 20% to display improved ability to perform housework  Pt will reduce pain at its worst from 9-10/10 to 6/10 to allow for improved ability to sleep  Pt will improve side glide ROM from mod loss with pain to free and full allow for improved ability to  ambulate  Pt will be independent with home program to allow for maintenance of goals achieved in therapy. Plan:  Measure side glide on wall with ankle weight 25 inches  Walking any pain? Plantar flexion MMT  Lumbar extension and flexion with therapist overpressure    Sitting upright posture    Supine:  Straight leg raise (L vs. R)  FADIR bilaterally (hip)    Side lying:  Sacral compression (on side)  Hip abduction MMT    Prone:  Hip extension MMT    Prone press up: see mobilize and overpressure (L5, L3 and L1) any change with each level? Sustained lumbar if no better  Lumbar flexion in no change    Progress strength:  Prone leg raise  Plank on knees   Standing hip abduction    Massage R buttock prone with buffer    Ask if sleeping with sheet tied around his waist, show him this. Note:  Calf raises: worse (more pain upright posture and walking) after    Alternative lateral:  Hips to the R with prone press up      Note: possible fulcrum on mid-lumbar spine may be superior to lower level (reports step up a little better after mid  Lumbar spine, but no change with side glide ROM after) (6/16/2023)        Charges: 2 there ex, 1 there act     Total Timed Treatment: 38 min  Total Treatment Time: 38 min  Date: 6/16/2023  Tx#: 2 Date: 6/19/2023  Tx#: 3 Date: Tx#: 4 Date: Tx#: 5 Date:   Tx#: 6   Ther-Ex 30 minutes:    R Side glide hips to the L on wall 20x2: a bit better    Extension in standing fulcrum lower lumbar spine 10x3: a bit better (maybe a bit better 10 reps with pressure at mid-lumbar spine)    Shift correction lateral only hips to the L (R side glide) 10x3: produce, no worse. After a bit better    Calf raises 10x2: worse (more pain upright posture and walking) after     Wall slide 10x2: after a bit better side glide ROM    Educated on home program, see below. Verbal, visual and tactile cues for there ex. Ther-Ex 30 minutes:  Prone press up 10x5: progressed to strap or therapist overpessure: somewhat better    Lumbar mobilization L5, 3 minutes: better    Bridge 10x2    Straight leg raise 5x2: produce, no worse R buttock    Wall slides x10    Educated on home program, see below. Verbal, visual and tactile cues for there ex. Manual         N Re-Ed          There act 8 minutes:  Educated on ideal posture sitting (avoid slumping, after slumping 2 minutes loses motion with side glide), but pain to upright posture at start of session.      Educated on plan of care and pathology There act 8 minutes:  Educated on ideal posture sitting      Educated on plan of care and pathology

## 2023-06-22 ENCOUNTER — OFFICE VISIT (OUTPATIENT)
Dept: PHYSICAL THERAPY | Age: 88
End: 2023-06-22
Attending: FAMILY MEDICINE
Payer: MEDICARE

## 2023-06-22 PROCEDURE — 97140 MANUAL THERAPY 1/> REGIONS: CPT

## 2023-06-22 PROCEDURE — 97110 THERAPEUTIC EXERCISES: CPT

## 2023-06-22 NOTE — PROGRESS NOTES
Insurance (Authorized # of Visits):  Medicare        Diagnosis:   Acute bilateral low back pain without sciatica (M54.50)        Referring Provider: David Chandler   Date of Evaluation:    6/13/2023  Precautions:  None  Next MD visit:   none scheduled  Date of Surgery: n/a      Subjective:   Elian Torres is a 80year old male who presents to therapy today with complaints of R buttock, R ankle and R foot. Pt describes pain level  at worst 9-10/10. Sharp pain  Current functional limitations include walking, standing, housework such as raking, twisting or a lot of \"up and down\" (bending for housework). Right leg weaker with stepping up a step. Did 75% of home program. Overall 20% better, sleeping went well now. A bit better since last visit. Objective:   (Pain with *)  Tested 6/22/2023:  Lumbar AROM: (* denotes performed with pain)  Flexion: min/min* (with therapist overpressure)  Extension: mod/min*  Sidebending: R mod* (6 fingers); L min/mod* (4 fingers) 19 inches  L shift (tested 6/19/2023)    Hip AAROM:  FADIR bilaterally (hip): severe/mod loss L, R intact    Special tests:  Neuro Screen:    Straight leg raise: R leg*    Strength: (* denotes performed with pain)  LE   Hip Extension: R 5/5; L 4/5*           PF (S1): R 3-/5; L 3-/5 (one rep each)      Hip abduction: R 3-/5*; L 3-/5 (L tested 6/13/2023)              Intact:   Special tests:  Walking  Upright posture     Sacral compression (on side): intact    Tested 6/19/2023:   Hip AAROM:  Supine ER* bilaterally (on each side, pain on ipsilateral low back, does not normall  BUCK* bilaterally  Prone IR* mod/severe loss right leg, left intact  Prone IR in extension bias* min loss right leg, left intact    Special tests:  Neuro Screen:  hip flexor on right is slightly more tight and painful than left (both severe loss)   Step up 9\": 70% strength on R vs. L per report          Assessment: Given trial of prone press up with strap overpressure, with focus on L3 which improves lumbar ROM with side glides and will further improve ability to ambulate. Home program progressed for hip strength. Goals: (to be met in 16 visits)   Pt will improve TERRY score from 40% to 20% to display improved ability to perform housework  Pt will reduce pain at its worst from 9-10/10 to 6/10 to allow for improved ability to sleep  Pt will improve side glide ROM from mod loss with pain to free and full allow for improved ability to  ambulate  Pt will be independent with home program to allow for maintenance of goals achieved in therapy. Plan:  Measure side glide on wall with ankle weight 19 inches  Walking any pain? Lumbar extension and flexion with therapist overpressure    Sitting upright posture    Exhaust Prone press up L3 mobilization/overpressure  Sustained lumbar if no better      Lumbar flexion in no change    Progress strength:  Plank on knees (maybe with hands up and hold 15 seconds)  Standing hip abduction    Note:  Calf raises: worse (more pain upright posture and walking) after    Wall slide x10: after worse walking    Hamstring stretch standing 30' rotation: after worse walking        Massage R buttock prone with buffer (Ask what he thought of this last time)    Ask if sleeping with sheet tied around his waist, show him this.       Alternative lateral:  Hips to the R with prone press up    Make it clear that prone press up is most important exercise, the rest are extra credit (4x a day at least)  Cut out bending forward as self assessment (he seems to think its an exercise per phone call 6/23/2023)    Note: possible fulcrum on mid-lumbar spine may be superior to lower level (reports step up a little better after mid  Lumbar spine, but no change with side glide ROM after) (6/16/2023)    Future:  Slouch overcorrection    Charges: 2 there ex, 1 manual therapy     Total Timed Treatment: 38 min  Total Treatment Time: 38 min  Date: 6/16/2023  Tx#: 2 Date: 6/19/2023  Tx#: 3 Date:  Tx#: 4 Date: Tx#: 5 Date: Tx#: 6   Ther-Ex 30 minutes:    R Side glide hips to the L on wall 20x2: a bit better    Extension in standing fulcrum lower lumbar spine 10x3: a bit better (maybe a bit better 10 reps with pressure at mid-lumbar spine)    Shift correction lateral only hips to the L (R side glide) 10x3: produce, no worse. After a bit better    Calf raises 10x2: worse (more pain upright posture and walking) after     Wall slide 10x2: after a bit better side glide ROM    Educated on home program, see below. Verbal, visual and tactile cues for there ex. Ther-Ex 30 minutes:  Prone press up 10x5: progressed to strap or therapist overpessure: somewhat better    Lumbar mobilization L5, 3 minutes: better    Bridge 10x2    Straight leg raise 5x2: produce, no worse R buttock    Wall slides x10    Educated on home program, see below. Verbal, visual and tactile cues for there ex. There ex 23 minutes:  Wall slide x10: after worse walking    Hamstring stretch standing 30' rotation: after worse walking    Prone leg raise x6    Bridge 10x    Straight leg raise x2    Prone press up 10x4 with overpressure: better    Discussed plan of care and progress    Reviewed posture    Educated on home program, see below. Verbal, visual and tactile cues for there ex. Manual    Manual 15 minutes  Prone press up:  mobilize and overpressure (L5, L3 and L1): improvement L5 and L3, no change L1    Buffer R buttock and hamstring soft tissue massage         N Re-Ed          There act 8 minutes:  Educated on ideal posture sitting (avoid slumping, after slumping 2 minutes loses motion with side glide), but pain to upright posture at start of session.      Educated on plan of care and pathology There act 8 minutes:  Educated on ideal posture sitting      Educated on plan of care and pathology

## 2023-06-23 ENCOUNTER — TELEPHONE (OUTPATIENT)
Dept: PHYSICAL THERAPY | Age: 88
End: 2023-06-23

## 2023-06-23 NOTE — TELEPHONE ENCOUNTER
Back was good yesterday, more pain today, very limited prone press up exercise. Therapist said this is his most important exercise, patient said he would do more.
yes
yes

## 2023-06-26 ENCOUNTER — TELEPHONE (OUTPATIENT)
Dept: PHYSICAL THERAPY | Age: 88
End: 2023-06-26

## 2023-06-26 NOTE — TELEPHONE ENCOUNTER
Still having back pain. Feels good after exercises, but doesn't last. Sometimes upright posture helps, sometimes pain with this.

## 2023-06-27 ENCOUNTER — APPOINTMENT (OUTPATIENT)
Dept: PHYSICAL THERAPY | Age: 88
End: 2023-06-27
Attending: FAMILY MEDICINE
Payer: MEDICARE

## 2023-06-30 ENCOUNTER — APPOINTMENT (OUTPATIENT)
Dept: PHYSICAL THERAPY | Age: 88
End: 2023-06-30
Attending: FAMILY MEDICINE
Payer: MEDICARE

## 2023-07-03 ENCOUNTER — APPOINTMENT (OUTPATIENT)
Dept: PHYSICAL THERAPY | Age: 88
End: 2023-07-03
Attending: FAMILY MEDICINE
Payer: MEDICARE

## 2023-07-05 ENCOUNTER — OFFICE VISIT (OUTPATIENT)
Dept: PHYSICAL THERAPY | Age: 88
End: 2023-07-05
Attending: FAMILY MEDICINE
Payer: MEDICARE

## 2023-07-05 PROCEDURE — 97140 MANUAL THERAPY 1/> REGIONS: CPT

## 2023-07-05 PROCEDURE — 97110 THERAPEUTIC EXERCISES: CPT

## 2023-07-05 NOTE — PROGRESS NOTES
Insurance (Authorized # of Visits):  Medicare        Diagnosis:   Acute bilateral low back pain without sciatica (M54.50)        Referring Provider: Froylan Jones   Date of Evaluation:    6/13/2023  Precautions:  None  Next MD visit:   none scheduled  Date of Surgery: n/a      Subjective:   Nya De La Cruz is a 80year old male who presents to therapy today with complaints of R buttock, R ankle and R foot. Pt describes pain level  at worst 9-10/10. Sharp pain    Current functional limitations include walking, standing, housework such as raking, twisting or a lot of \"up and down\" (bending for housework). Right leg weaker with stepping up a step. Did 20% of home program, was in hospital for heart surgery, no precautions. . Overall 20% better, sleeping went well now. A bit better since last visit. Objective:   (Pain with *)  Tested 7/5/2023:  Lumbar AROM: (* denotes performed with pain)  Flexion: min/min  Extension: mod/min  Sidebending: R mod* (4 fingers); L min/mod (2 fingers) 25 inches  L shift (tested 6/19/2023)    Neuro Screen:    Straight leg raise length: min loss R, nil L    Intact:  Special tests:  Neuro Screen:    Straight leg raise strength: R leg: intact      Tested 7/5/2023: Hip AAROM:  FADIR bilaterally (hip): severe/mod loss L, R intact    Strength: (* denotes performed with pain)  LE   Hip Extension: R 5/5; L 4/5*           PF (S1): R 3-/5; L 3-/5 (one rep each)      Hip abduction: R 3-/5*; L 3-/5 (L tested 6/13/2023)              Intact:   Special tests:  Walking  Upright posture     Sacral compression (on side): intact    Tested 6/19/2023:   Hip AAROM:  Supine ER* bilaterally (on each side, pain on ipsilateral low back, does not normall  BUCK* bilaterally  Prone IR* mod/severe loss right leg, left intact  Prone IR in extension bias* min loss right leg, left intact    Special tests:  Neuro Screen:  hip flexor on right is slightly more tight and painful than left (both severe loss)   Step up 9\": 70% strength on R vs. L per report          Assessment: Given sustained lumbar extension. Home program progressed for core strength. Goals: (to be met in 16 visits)   Pt will improve TERRY score from 40% to 20% to display improved ability to perform housework  Pt will reduce pain at its worst from 9-10/10 to 6/10 to allow for improved ability to sleep  Pt will improve side glide ROM from mod loss with pain to free and full allow for improved ability to  ambulate  Pt will be independent with home program to allow for maintenance of goals achieved in therapy. Plan:  Measure side glide on wall with ankle weight 19 inches  Walking any pain?   Lumbar extension and flexion with therapist overpressure  BUCK  MMT prone hip extension bilaterally    Sitting upright posture        Exhaust Prone press up L3 mobilization/overpressure  Sustained lumbar if no better      Lumbar flexion in no change    Progress strength:  Plank on knees (maybe with hands up and hold 15 seconds)  Standing hip abduction    Note:  Calf raises: worse (more pain upright posture and walking) after    Wall slide x10: after worse walking    Hamstring stretch standing 30' rotation: after worse walking        Massage R buttock prone with buffer (Ask what he thought of this last time)      Alternative lateral:  Hips to the R with prone press up    Make it clear that prone press up is most important exercise, the rest are extra credit (4x a day at least)  Cut out bending forward as self assessment (he seems to think its an exercise per phone call 6/23/2023)    Note: possible fulcrum on mid-lumbar spine may be superior to lower level (reports step up a little better after mid  Lumbar spine, but no change with side glide ROM after) (6/16/2023)    Future:  Slouch overcorrection    Charges: 2 there ex, 1 manual therapy     Total Timed Treatment: 38 min  Total Treatment Time: 38 min  Date: 6/16/2023  Tx#: 2 Date: 6/19/2023  Tx#: 3 Date: 6/22/2023  Tx#: 4 Date:7/5/2023  Tx#: 5 Date: Tx#: 6   Ther-Ex 30 minutes:    R Side glide hips to the L on wall 20x2: a bit better    Extension in standing fulcrum lower lumbar spine 10x3: a bit better (maybe a bit better 10 reps with pressure at mid-lumbar spine)    Shift correction lateral only hips to the L (R side glide) 10x3: produce, no worse. After a bit better    Calf raises 10x2: worse (more pain upright posture and walking) after     Wall slide 10x2: after a bit better side glide ROM    Educated on home program, see below. Verbal, visual and tactile cues for there ex. Ther-Ex 30 minutes:  Prone press up 10x5: progressed to strap or therapist overpessure: somewhat better    Lumbar mobilization L5, 3 minutes: better    Bridge 10x2    Straight leg raise 5x2: produce, no worse R buttock    Wall slides x10    Educated on home program, see below. Verbal, visual and tactile cues for there ex. There ex 23 minutes:  Wall slide x10: after worse walking    Hamstring stretch standing 30' rotation: after worse walking    Prone leg raise x6    Bridge 10x    Straight leg raise x2    Prone press up 10x4 with overpressure: better    Discussed plan of care and progress    Reviewed posture    Educated on home program, see below. Verbal, visual and tactile cues for there ex. There ex 30 minutes:  Prone leg raise x10x2    Bridge 15x    Straight leg raise x10    Prone press sustained 1-3 minutes x3: better    Plank on knees 60-30'x2    Discussed plan of care and progress    Reviewed posture    Educated on home program, see below.   Verbal, visual and tactile cues for ther    Manual    Manual 15 minutes  Prone press up:  mobilize and overpressure (L5, L3 and L1): improvement L5 and L3, no change L1    Buffer R buttock and hamstring soft tissue massage     Manual 8 minutes    Buffer R buttock and hamstring soft tissue massage    N Re-Ed          There act 8 minutes:  Educated on ideal posture sitting (avoid slumping, after slumping 2 minutes loses motion with side glide), but pain to upright posture at start of session.      Educated on plan of care and pathology There act 8 minutes:  Educated on ideal posture sitting      Educated on plan of care and pathology

## 2023-07-07 ENCOUNTER — TELEPHONE (OUTPATIENT)
Dept: PHYSICAL THERAPY | Age: 88
End: 2023-07-07

## 2023-07-12 ENCOUNTER — OFFICE VISIT (OUTPATIENT)
Dept: PHYSICAL THERAPY | Age: 88
End: 2023-07-12
Attending: FAMILY MEDICINE
Payer: MEDICARE

## 2023-07-12 PROCEDURE — 97140 MANUAL THERAPY 1/> REGIONS: CPT

## 2023-07-12 PROCEDURE — 97110 THERAPEUTIC EXERCISES: CPT

## 2023-07-12 NOTE — PROGRESS NOTES
Insurance (Authorized # of Visits):  Medicare        Diagnosis:   Acute bilateral low back pain without sciatica (M54.50)        Referring Provider: Sabine Sky   Date of Evaluation:    6/13/2023  Precautions:  None  Next MD visit:   none scheduled  Date of Surgery: n/a      Subjective:   Afia Fernando is a 80year old male who presents to therapy today with complaints of R buttock, R ankle and R foot. Pt describes pain level  at worst 9-10/10. Sharp pain    Current functional limitations include walking, standing, housework such as raking, twisting or a lot of \"up and down\" (bending for housework). Right leg weaker with stepping up a step. Did 70% of home program. Overall the same. Objective:   (Pain with *)  Tested 7/12/2023:  Lumbar AROM: (* denotes performed with pain)  Flexion: min (with overpressure)  Extension: min (with overpressure)  Sidebending: R mod* (2 fingers); L min/mod (3 fingers) 24 inches (weight outside of feet)    Strength: (* denotes performed with pain)  Hip Extension: R 3-/5; L 5/5*      Special tests:  Walking*  Upright posture* (abolished pain slumped sitting)     Hip AAROM:  BUCK: L mod/severe* (non-familiar pain), R severe* (familiar pain)      Tested 7/5/2023: Hip AAROM:  FADIR bilaterally (hip): severe/mod loss L, R intact    Strength: (* denotes performed with pain)  LE            PF (S1): R 3-/5; L 3-/5 (one rep each)      Hip abduction: R 3-/5*; L 3-/5 (L tested 6/13/2023)                Tested 6/19/2023: Hip AAROM:  Supine ER* bilaterally (on each side, pain on ipsilateral low back, does not normall  BUCK* bilaterally  Prone IR* mod/severe loss right leg, left intact  Prone IR in extension bias* min loss right leg, left intact    Special tests:  Neuro Screen:  hip flexor on right is slightly more tight and painful than left (both severe loss)   Step up 9\": 70% strength on R vs. L per report      Assessment: Given of standing lumbar extension.  Home program progressed for core strength. Goals: (to be met in 16 visits)   Pt will improve TERRY score from 40% to 20% to display improved ability to perform housework  Pt will reduce pain at its worst from 9-10/10 to 6/10 to allow for improved ability to sleep  Pt will improve side glide ROM from mod loss with pain to free and full allow for improved ability to  ambulate  Pt will be independent with home program to allow for maintenance of goals achieved in therapy. Plan:  Progress note next visit (or the one after)    Measure side glide on wall with ankle weight 19 inches  Walking any pain?   Lumbar extension and flexion with therapist overpressure  BUCK  MMT prone hip extension bilaterally    Sitting upright posture      Re-test these:  Hip AAROM:  Supine ER* bilaterally (on each side, pain on ipsilateral low back, does not normall  BUCK* bilaterally  Prone IR* mod/severe loss right leg, left intact  Prone IR in extension bias* min loss right leg, left intact      Exhaust Standing L3 mobilization/overpressure      Lumbar flexion in no change    Progress strength:  Sitting posture/overcorreciton  Standing hip abduction    Note:  Calf raises: worse (more pain upright posture and walking) after    Wall slide x10: after worse walking    Hamstring stretch standing 30' rotation: after worse walking        Alternative lateral:  Hips to the R with prone press up    Make it clear that prone press up is most important exercise, the rest are extra credit (4x a day at least)  Cut out bending forward as self assessment (he seems to think its an exercise per phone call 6/23/2023)    Note: possible fulcrum on mid-lumbar spine may be superior to lower level (reports step up a little better after mid  Lumbar spine, but no change with side glide ROM after) (6/16/2023)    Future:  Slouch overcorrection    Charges: 2 there ex, 1 manual therapy     Total Timed Treatment: 38 min  Total Treatment Time: 38 min  Date: 6/16/2023  Tx#: 2 Date: 6/19/2023  Tx#: 3 Date: 6/22/2023  Tx#: 4 Date:7/5/2023  Tx#: 5 Date: 7/12/2023  Tx#: 6    Ther-Ex 30 minutes:    R Side glide hips to the L on wall 20x2: a bit better    Extension in standing fulcrum lower lumbar spine 10x3: a bit better (maybe a bit better 10 reps with pressure at mid-lumbar spine)    Shift correction lateral only hips to the L (R side glide) 10x3: produce, no worse. After a bit better    Calf raises 10x2: worse (more pain upright posture and walking) after     Wall slide 10x2: after a bit better side glide ROM    Educated on home program, see below. Verbal, visual and tactile cues for there ex. Ther-Ex 30 minutes:  Prone press up 10x5: progressed to strap or therapist overpessure: somewhat better    Lumbar mobilization L5, 3 minutes: better    Bridge 10x2    Straight leg raise 5x2: produce, no worse R buttock    Wall slides x10    Educated on home program, see below. Verbal, visual and tactile cues for there ex. There ex 23 minutes:  Wall slide x10: after worse walking    Hamstring stretch standing 30' rotation: after worse walking    Prone leg raise x6    Bridge 10x    Straight leg raise x2    Prone press up 10x4 with overpressure: better    Discussed plan of care and progress    Reviewed posture    Educated on home program, see below. Verbal, visual and tactile cues for there ex. There ex 30 minutes:  Prone leg raise x10x2    Bridge 15x    Straight leg raise x10    Prone press sustained 1-3 minutes x3: better    Plank on knees 60-30'x2    Discussed plan of care and progress    Reviewed posture    Educated on home program, see below. Verbal, visual and tactile cues for ther There ex 30 minutes:    Sustained lumbar extension 3 minutes: better    Extension in standing lower lumbar spine pressure x10: no effect (fulcrum), same but mid-lumbar spine x10: better (2 more sets done with strap)    Sitting posture education.  3 minutes    Prone leg raise x12x    Bridge 20x    Straight leg raise x15    Plank on knees 61'    Educated on home program, see below. Verbal, visual and tactile cues for there ex. Manual    Manual 15 minutes  Prone press up:  mobilize and overpressure (L5, L3 and L1): improvement L5 and L3, no change L1    Buffer R buttock and hamstring soft tissue massage     Manual 8 minutes    Buffer R buttock and hamstring soft tissue massage Manual 8 minutes:  Mobilize L3 and overpressure with press up: no better    N Re-Ed           There act 8 minutes:  Educated on ideal posture sitting (avoid slumping, after slumping 2 minutes loses motion with side glide), but pain to upright posture at start of session.      Educated on plan of care and pathology There act 8 minutes:  Educated on ideal posture sitting      Educated on plan of care and pathology

## 2023-07-17 ENCOUNTER — OFFICE VISIT (OUTPATIENT)
Dept: PHYSICAL THERAPY | Age: 88
End: 2023-07-17
Attending: FAMILY MEDICINE
Payer: MEDICARE

## 2023-07-17 PROCEDURE — 97110 THERAPEUTIC EXERCISES: CPT

## 2023-07-17 PROCEDURE — 97140 MANUAL THERAPY 1/> REGIONS: CPT

## 2023-07-17 PROCEDURE — 97112 NEUROMUSCULAR REEDUCATION: CPT

## 2023-07-17 NOTE — TELEPHONE ENCOUNTER
This request is from the pharmacy. Medication was d/c'd 3/59/55, reason: \"duplicate therapy\"    I don't see that this medication on pt's dispense hx. Please advise if this was d/c'd because pt is on bumetanide.

## 2023-07-17 NOTE — PROGRESS NOTES
Discharge Summary  Pt has attended 7 visits in Physical Therapy. Insurance (Authorized # of Visits):  Medicare        Diagnosis:   Acute bilateral low back pain without sciatica (M54.50)        Referring Provider: Lyndsay Blackwell   Date of Evaluation:    6/13/2023  Precautions:  None  Next MD visit:   none scheduled  Date of Surgery: n/a      Subjective:   Kalen Douglas is a 80year old male who presents to therapy today with complaints of R buttock, R ankle and R foot. Pt describes pain level  at worst 3-4/10. Sharp pain    Current functional limitations include walking, standing, housework such as raking, twisting or a lot of \"up and down\" (bending for housework). Right leg weaker with stepping up a step. Did 20% of home program.     Reports 25-30% progress overall, expects further progress as he continues with home program. Can now sit without pain for prolonged periods, but still pain standing and walking yet these are less intense pain and he can now stand for over 30 minutes. Objective:   (Pain with *)  Tested 7/17/2023:  Lumbar AROM: (* denotes performed with pain)  Flexion: min (with overpressure)  Extension: min (with overpressure)  Sidebending: R mod* (2 fingers); L min/mod (3 fingers)* 24 inches (weight outside of feet)    Strength: (* denotes performed with pain)  Hip Extension: R 3-/5*; L 5/5*      Hip AAROM:  BUCK: Mod loss bilaterally  Supine ER* bilaterally (on each side, pain on ipsilateral low back, does not normall  Prone IR* min loss right leg, left intact  Prone IR* in min loss right leg, left intact      Intact:  Special tests:  Walking  Upright posture      Tested 7/5/2023:   Hip AAROM:  FADIR bilaterally (hip): severe/mod loss L, R intact    Strength: (* denotes performed with pain)  LE            PF (S1): R 3-/5; L 3-/5 (one rep each)      Hip abduction: R 3-/5*; L 3-/5 (L tested 6/13/2023)              Special tests:  Neuro Screen:  hip flexor on right is slightly more tight and painful than left (both severe loss)   Step up 9\": 70% strength on R vs. L per report      Assessment: Ready for discharge to home program. Reports 25-30% progress overall, expects further progress as he continues with home program. Can now sit without pain for prolonged periods, but still pain standing and walking yet these are less intense pain and he can now stand for over 30 minutes. Limited adherence to home program slows down progress. Goals: (to be met in 16 visits)   Pt will improve TERRY score from 40% to 20% to display improved ability to perform housework MET  Pt will reduce pain at its worst from 9-10/10 to 6/10 to allow for improved ability to sleep MET  Pt will improve side glide ROM from mod loss with pain to free and full allow for improved ability to  ambulate (50% progress, 7/17/2023)  Pt will be independent with home program to allow for maintenance of goals achieved in therapy. (20% progress 7/17/2023)      Post Oswestry Disability Index Score  Post Score: 6 % (7/17/2023 11:38 AM)    34 % improvement    Plan: Continue home program.      Patient/Family/Caregiver was advised of these findings, precautions, and treatment options and has agreed to actively participate in planning and for this course of care. Thank you for your referral. If you have any questions, please contact me at Dept: [phone]. Sincerely,  Electronically signed by therapist: Arie Duffy, PT     Physician's certification required:  No  Please co-sign or sign and return this letter via fax as soon as possible to [phone]. I certify the need for these services furnished under this plan of treatment and while under my care.     X___________________________________________________ Date____________________    Certification From: 1/86/7388  To:10/15/2023        Additional ideas:  Hip extension repeated and see effect (lunge first, then open chain next if no effect)      Progress note next visit (or the one after)    Measure side glide on wall with ankle weight 19 inches  Walking any pain? Lumbar extension and flexion with therapist overpressure  BUCK  MMT prone hip extension bilaterally    Sitting upright posture      Re-test these:  Hip AAROM:  Supine ER* bilaterally (on each side, pain on ipsilateral low back, does not normall  BUCK* bilaterally  Prone IR* mod/severe loss right leg, left intact  Prone IR in extension bias* min loss right leg, left intact      Exhaust Standing L3 mobilization/overpressure      Lumbar flexion in no change    Progress strength:  Sitting posture/overcorreciton  Standing hip abduction    Note:  Calf raises: worse (more pain upright posture and walking) after    Wall slide x10: after worse walking    Hamstring stretch standing 30' rotation: after worse walking        Alternative lateral:  Hips to the R with prone press up    Make it clear that prone press up is most important exercise, the rest are extra credit (4x a day at least)  Cut out bending forward as self assessment (he seems to think its an exercise per phone call 6/23/2023)    Note: possible fulcrum on mid-lumbar spine may be superior to lower level (reports step up a little better after mid  Lumbar spine, but no change with side glide ROM after) (6/16/2023)    Future:  Slouch overcorrection    Charges: 1 there ex, 1 manual therapy, 1 neuro re-education     Total Timed Treatment: 38 min  Total Treatment Time: 38 min  Date:7/5/2023  Tx#: 5 Date: 7/12/2023  Tx#: 6 7/17/2023  Tx# 7     There ex 30 minutes:  Prone leg raise x10x2    Bridge 15x    Straight leg raise x10    Prone press sustained 1-3 minutes x3: better    Plank on knees 60-30'x2    Discussed plan of care and progress    Reviewed posture    Educated on home program, see below.   Verbal, visual and tactile cues for ther There ex 30 minutes:    Sustained lumbar extension 3 minutes: better    Extension in standing lower lumbar spine pressure x10: no effect (fulcrum), same but mid-lumbar spine x10: better (2 more sets done with strap)    Sitting posture education. 3 minutes    Prone leg raise x12x    Bridge 20x    Straight leg raise x15    Plank on knees 60'    Educated on home program, see below. Verbal, visual and tactile cues for there ex. There ex 22 minutes:    Extension in standing  mid-lumbar spine x15: better (2 sets done with strap)    Prone leg raise x12x    Bridge 20x    Straight leg raise x15    Plank on knees 60'    Educated on home program, see below. Verbal, visual and tactile cues for there ex.    Manual 8 minutes    Buffer R buttock and hamstring soft tissue massage Manual 8 minutes:  Mobilize L3 and overpressure with press up: no better Manual 8 minutes:  Mobilize L3 and overpressure with press up: better with overpressure standing repeated, no change after 2nd set with prone mobilization prior     Neuro re-education 15 minutes:  Posture correction x5 minutes    Discussed plan of care, progress and long term care of back

## 2023-07-18 RX ORDER — FUROSEMIDE 20 MG/1
TABLET ORAL
Qty: 45 TABLET | Refills: 0 | OUTPATIENT
Start: 2023-07-18

## 2023-07-25 ENCOUNTER — APPOINTMENT (OUTPATIENT)
Dept: PHYSICAL THERAPY | Age: 88
End: 2023-07-25
Attending: FAMILY MEDICINE
Payer: MEDICARE

## 2023-08-01 ENCOUNTER — APPOINTMENT (OUTPATIENT)
Dept: PHYSICAL THERAPY | Age: 88
End: 2023-08-01
Attending: FAMILY MEDICINE
Payer: MEDICARE

## 2023-08-08 ENCOUNTER — APPOINTMENT (OUTPATIENT)
Dept: PHYSICAL THERAPY | Age: 88
End: 2023-08-08
Attending: FAMILY MEDICINE
Payer: MEDICARE

## 2023-09-19 ENCOUNTER — TELEPHONE (OUTPATIENT)
Dept: FAMILY MEDICINE CLINIC | Facility: CLINIC | Age: 88
End: 2023-09-19

## 2023-09-19 NOTE — TELEPHONE ENCOUNTER
Spoke with pt. States he fell last week and has had back pain since. Asks if he can get a referral for a \"back doctor\"? Advised OV for eval first, so appropriate care can be determined.   OV scheduled with Dr. Ricky Perez on 9/20

## 2023-09-20 ENCOUNTER — HOSPITAL ENCOUNTER (OUTPATIENT)
Dept: GENERAL RADIOLOGY | Age: 88
Discharge: HOME | End: 2023-09-20
Attending: FAMILY MEDICINE
Payer: MEDICARE

## 2023-09-20 ENCOUNTER — OFFICE VISIT (OUTPATIENT)
Dept: FAMILY MEDICINE CLINIC | Facility: CLINIC | Age: 88
End: 2023-09-20
Payer: MEDICARE

## 2023-09-20 VITALS
TEMPERATURE: 98 F | RESPIRATION RATE: 16 BRPM | HEART RATE: 73 BPM | SYSTOLIC BLOOD PRESSURE: 130 MMHG | DIASTOLIC BLOOD PRESSURE: 60 MMHG | BODY MASS INDEX: 23 KG/M2 | OXYGEN SATURATION: 97 % | WEIGHT: 161.5 LBS

## 2023-09-20 DIAGNOSIS — M54.50 ACUTE MIDLINE LOW BACK PAIN WITHOUT SCIATICA: Primary | ICD-10-CM

## 2023-09-20 DIAGNOSIS — M54.50 ACUTE MIDLINE LOW BACK PAIN WITHOUT SCIATICA: ICD-10-CM

## 2023-09-20 PROCEDURE — 72110 X-RAY EXAM L-2 SPINE 4/>VWS: CPT | Performed by: FAMILY MEDICINE

## 2023-09-20 PROCEDURE — 99214 OFFICE O/P EST MOD 30 MIN: CPT | Performed by: FAMILY MEDICINE

## 2023-09-20 PROCEDURE — 1125F AMNT PAIN NOTED PAIN PRSNT: CPT | Performed by: FAMILY MEDICINE

## 2023-09-20 RX ORDER — DOXAZOSIN MESYLATE 4 MG/1
4 TABLET ORAL NIGHTLY
COMMUNITY
Start: 2023-09-05

## 2023-12-13 ENCOUNTER — PATIENT OUTREACH (OUTPATIENT)
Dept: FAMILY MEDICINE CLINIC | Facility: CLINIC | Age: 88
End: 2023-12-13

## 2024-01-24 ENCOUNTER — OFFICE VISIT (OUTPATIENT)
Dept: FAMILY MEDICINE CLINIC | Facility: CLINIC | Age: 89
End: 2024-01-24
Payer: MEDICARE

## 2024-01-24 VITALS
HEART RATE: 67 BPM | BODY MASS INDEX: 23.34 KG/M2 | RESPIRATION RATE: 18 BRPM | SYSTOLIC BLOOD PRESSURE: 132 MMHG | TEMPERATURE: 98 F | WEIGHT: 163 LBS | OXYGEN SATURATION: 97 % | DIASTOLIC BLOOD PRESSURE: 54 MMHG | HEIGHT: 70.08 IN

## 2024-01-24 DIAGNOSIS — J43.2 CENTRILOBULAR EMPHYSEMA (HCC): ICD-10-CM

## 2024-01-24 DIAGNOSIS — I10 ESSENTIAL HYPERTENSION: ICD-10-CM

## 2024-01-24 DIAGNOSIS — N18.32 CHRONIC KIDNEY DISEASE (CKD) STAGE G3B/A1, MODERATELY DECREASED GLOMERULAR FILTRATION RATE (GFR) BETWEEN 30-44 ML/MIN/1.73 SQUARE METER AND ALBUMINURIA CREATININE RATIO LESS THAN 30 MG/G (HCC): ICD-10-CM

## 2024-01-24 DIAGNOSIS — I48.0 PAROXYSMAL ATRIAL FIBRILLATION (HCC): ICD-10-CM

## 2024-01-24 DIAGNOSIS — E79.0 HYPERURICEMIA: ICD-10-CM

## 2024-01-24 DIAGNOSIS — Z95.2 S/P AVR: ICD-10-CM

## 2024-01-24 DIAGNOSIS — N40.0 BENIGN PROSTATIC HYPERPLASIA WITHOUT LOWER URINARY TRACT SYMPTOMS: ICD-10-CM

## 2024-01-24 DIAGNOSIS — I73.9 PVD (PERIPHERAL VASCULAR DISEASE) (HCC): ICD-10-CM

## 2024-01-24 DIAGNOSIS — D63.1 ANEMIA DUE TO STAGE 3B CHRONIC KIDNEY DISEASE  (HCC): ICD-10-CM

## 2024-01-24 DIAGNOSIS — N62 SUBAREOLAR GYNECOMASTIA IN MALE: ICD-10-CM

## 2024-01-24 DIAGNOSIS — N18.32 ANEMIA DUE TO STAGE 3B CHRONIC KIDNEY DISEASE  (HCC): ICD-10-CM

## 2024-01-24 DIAGNOSIS — R20.2 PARESTHESIAS IN RIGHT HAND: ICD-10-CM

## 2024-01-24 DIAGNOSIS — E78.00 HYPERCHOLESTEREMIA: ICD-10-CM

## 2024-01-24 DIAGNOSIS — I87.2 VENOUS INSUFFICIENCY OF BOTH LOWER EXTREMITIES: ICD-10-CM

## 2024-01-24 DIAGNOSIS — Z98.890 S/P BALLOON AORTIC VALVULOPLASTY: ICD-10-CM

## 2024-01-24 DIAGNOSIS — I50.22 CHRONIC SYSTOLIC CONGESTIVE HEART FAILURE (HCC): ICD-10-CM

## 2024-01-24 DIAGNOSIS — I25.10 CORONARY ARTERY DISEASE INVOLVING NATIVE CORONARY ARTERY OF NATIVE HEART WITHOUT ANGINA PECTORIS: ICD-10-CM

## 2024-01-24 DIAGNOSIS — E03.9 HYPOTHYROIDISM (ACQUIRED): ICD-10-CM

## 2024-01-24 DIAGNOSIS — M48.061 SPINAL STENOSIS OF LUMBAR REGION WITHOUT NEUROGENIC CLAUDICATION: ICD-10-CM

## 2024-01-24 DIAGNOSIS — Z00.00 ENCOUNTER FOR ANNUAL HEALTH EXAMINATION: Primary | ICD-10-CM

## 2024-01-24 LAB
CHOLEST SERPL-MCNC: 139 MG/DL (ref ?–200)
FASTING PATIENT LIPID ANSWER: NO
HDLC SERPL-MCNC: 61 MG/DL (ref 40–59)
LDLC SERPL CALC-MCNC: 59 MG/DL (ref ?–100)
NONHDLC SERPL-MCNC: 78 MG/DL (ref ?–130)
T4 FREE SERPL-MCNC: 1.1 NG/DL (ref 0.8–1.7)
TRIGL SERPL-MCNC: 102 MG/DL (ref 30–149)
TSI SER-ACNC: 2.56 MIU/ML (ref 0.36–3.74)
URATE SERPL-MCNC: 4.7 MG/DL
VLDLC SERPL CALC-MCNC: 15 MG/DL (ref 0–30)

## 2024-01-24 PROCEDURE — G0439 PPPS, SUBSEQ VISIT: HCPCS | Performed by: FAMILY MEDICINE

## 2024-01-24 PROCEDURE — 99214 OFFICE O/P EST MOD 30 MIN: CPT | Performed by: FAMILY MEDICINE

## 2024-01-24 PROCEDURE — 84550 ASSAY OF BLOOD/URIC ACID: CPT | Performed by: FAMILY MEDICINE

## 2024-01-24 PROCEDURE — 1125F AMNT PAIN NOTED PAIN PRSNT: CPT | Performed by: FAMILY MEDICINE

## 2024-01-24 PROCEDURE — 84439 ASSAY OF FREE THYROXINE: CPT | Performed by: FAMILY MEDICINE

## 2024-01-24 PROCEDURE — 84443 ASSAY THYROID STIM HORMONE: CPT | Performed by: FAMILY MEDICINE

## 2024-01-24 PROCEDURE — 80061 LIPID PANEL: CPT | Performed by: FAMILY MEDICINE

## 2024-01-24 NOTE — PATIENT INSTRUCTIONS
[facility] SCREENING SCHEDULE   Tests on this list are recommended by your physician but may not be covered, or covered at this frequency, by your insurer.   Please check with your insurance carrier before scheduling to verify coverage.   PREVENTATIVE SERVICES FREQUENCY &  COVERAGE DETAILS LAST COMPLETION DATE   Diabetes Screening    Fasting Blood Sugar / Glucose    One screening every 12 months if never tested or if previously tested but not diagnosed with pre-diabetes   One screening every 6 months if diagnosed with pre-diabetes Lab Results   Component Value Date    GLUCOSE 108 (H) 07/03/2014    GLU 96 09/09/2020        Cardiovascular Disease Screening    Lipid Panel  Cholesterol  Lipoprotein (HDL)  Triglycerides Covered every 5 years for all Medicare beneficiaries without apparent signs or symptoms of cardiovascular disease Lab Results   Component Value Date    CHOLEST 140 11/08/2022    HDL 48 11/08/2022    LDL 76 11/08/2022    TRIG 123 10/20/2021         Electrocardiogram (EKG)   Covered if needed at Welcome to Medicare, and non-screening if indicated for medical reasons 12/23/2019      Ultrasound Screening for Abdominal Aortic Aneurysm (AAA) Covered once in a lifetime for one of the following risk factors    Men who are 65-75 years old and have ever smoked    Anyone with a family history -     Colorectal Cancer Screening  Covered for ages 50-85; only need ONE of the following:    Colonoscopy   Covered every 10 years    Covered every 2 years if patient is at high risk or previous colonoscopy was abnormal -    No recommendations at this time    Flexible Sigmoidoscopy   Covered every 4 years -    Fecal Occult Blood Test Covered annually -   Prostate Cancer Screening    Prostate-Specific Antigen (PSA) Annually Lab Results   Component Value Date    PSA 3.4 09/17/2009     There are no preventive care reminders to display for this patient.   Immunizations    Influenza Covered once per flu season  Please get  every year 09/23/2023  No recommendations at this time    Pneumococcal Each vaccine ([id number] & [id number]) covered once after 65 Prevnar 13: 05/12/2015    [id number]: 11/10/2009     No recommendations at this time    Hepatitis B One screening covered for patients with certain risk factors   -  No recommendations at this time    Tetanus Toxoid Not covered by Medicare Part B unless medically necessary (cut with metal); may be covered with your pharmacy prescription benefits -    Tetanus, Diptheria and Pertusis TD and TDaP Not covered by Medicare Part B -  No recommendations at this time    Zoster Not covered by Medicare Part B; may be covered with your pharmacy  prescription benefits 09/06/2011  No recommendations at this time     Annual Monitoring of Persistent Medications (ACE/ARB, digoxin diuretics, anticonvulsants)    Potassium Annually Lab Results   Component Value Date    K 5.0 09/09/2020         Creatinine   Annually Lab Results   Component Value Date    CREATSERUM 1.64 (H) 09/09/2020         BUN Annually Lab Results   Component Value Date    BUN 38 (H) 09/09/2020       Drug Serum Conc Annually No results found for: \"DIGOXIN\", \"DIG\", \"VALP\"         Chronic Obstructive Pulmonary Disease (COPD)    Spirometry Annually Spirometry date:      1. Encounter for annual health examination  I reviewed age-appropriate preventative health screening exams as well as immunizations and advanced directives.    2. Centrilobular emphysema (HCC)  Continue Spiriva, albuterol as needed.  Activity as tolerated.  Discussed the importance of daily walking regimen    3. Essential hypertension  I reviewed goals for blood pressure as well as conservative management of hypertension including sodium restriction, daily aerobic activity, alcohol moderation, smoking cessation, and maintaining ideal body weight.  Continue current meds, patient encouraged to monitor his blood pressure several times per week    4. Paroxysmal atrial  fibrillation ([facility])  Currently sinus rhythm following Watchman device placement    5. Chronic systolic congestive heart failure ([facility])  Monitor clinically, activity as tolerated, follow-up with cardiology as scheduled    6. S/P balloon aortic valvuloplasty- 3/22/23  Monitor clinically, follow-up with cardiology as scheduled    7. Hypercholesteremia  Reviewed goals for lipids.  Continue statin therapy, check labs annually  - Lipid Panel [E]; Future  - Lipid Panel [E]    8. PVD (peripheral vascular disease) ([facility])  Reviewed signs and symptoms of PAD, activity as tolerated, monitor clinically, continue aspirin therapy    9. Coronary artery disease involving native coronary artery of native heart without angina pectoris  Follow-up with cardiology as scheduled    10. Spinal stenosis of lumbar region without neurogenic claudication  Activity as tolerated, Tylenol as needed    11. Hypothyroidism (acquired)  Continue thyroid replacement therapy, check labs annually  - TSH and Free T4 [E]; Future  - TSH and Free T4 [E]    12. Hyperuricemia  Continue allopurinol, increase daily fluid intake and check labs annually  - Uric Acid [905][Q]; Future  - Uric Acid [905][Q]    13. Anemia due to stage 3b chronic kidney disease ([facility])- hgb 10.9 on 1/23/24  Continue Procrit injections, follow-up with hematology    14. Chronic kidney disease (CKD) stage G3b/A1, moderately decreased glomerular filtration rate (GFR) between 30-44 mL/min/1.73 square meter and albuminuria creatinine ratio less than 30 mg/g ([facility])  Discussed importance of increased daily fluid intake and avoidance of potentially nephrotoxic drugs such as frequent NSAID use    15. Benign prostatic hyperplasia without lower urinary tract symptoms  Reviewed signs and symptoms of lower urinary tract outlet obstruction, continue doxazosin, follow-up with Dr. England annually    16. Venous insufficiency of both lower extremities  Monitor clinically, elevate legs at rest    17. Subareolar  gynecomastia in male  I advised patient I suspect there is gynecomastia secondary to prior finasteride use it in the setting of significant weight loss is more apparent at this time.  No further investigation indicated at this time    18. Paresthesias in right hand  Discussed likely positional nature.  Discussed proper neck posture exercises    19. S/P AVR  Follow-up with cardiology, echo periodically

## 2024-01-24 NOTE — H&P
Harrison Gonzalez is a 88 year old male   Chief Complaint   Patient presents with    Physical     Medicare AWV    COPD    HTN    Anemia    Kidney Problem    Hypothyroidism    Lipids    Medication Follow-Up     Here w/ wife  HPI:   Pt voices concerns of right hand fingers 1& 2 fall asleep at night, better w/ movement.  Patient noticed bilateral breast tissue, had been on finasteride in the past but stopped a year ago    Wt Readings from Last 6 Encounters:   01/24/24 163 lb (73.9 kg)   09/20/23 161 lb 8 oz (73.3 kg)   04/11/23 151 lb (68.5 kg)   01/11/23 179 lb (81.2 kg)   01/19/22 194 lb (88 kg)   10/20/21 191 lb (86.6 kg)     Body mass index is 23.34 kg/m².     Cholesterol, Total (mg/dL)   Date Value   10/20/2021 148   09/09/2020 132   09/30/2019 130   04/13/2017 133   09/17/2009 131   12/31/2008 141     CHOLESTEROL, TOTAL (no units)   Date Value   11/08/2022 140     HDL Cholesterol (mg/dL)   Date Value   10/20/2021 53   09/09/2020 52   09/30/2019 46   09/17/2009 42   12/31/2008 48   06/18/2008 46     HDL CHOL (no units)   Date Value   11/08/2022 48   04/13/2017 48     LDL Cholesterol Calc (mg/dL)   Date Value   09/17/2009 67   12/31/2008 66   06/18/2008 61     LDL Cholesterol (mg/dL)   Date Value   11/08/2022 76   10/20/2021 73   09/09/2020 67   09/30/2019 71   04/13/2017 68     Triglycerides (mg/dL)   Date Value   09/17/2009 110   12/31/2008 135   06/18/2008 84     AST (SGOT) (IU/L)   Date Value   09/17/2009 22   12/31/2008 23   10/01/2008 42 (H)     AST (U/L)   Date Value   09/09/2020 20   09/30/2019 15   01/18/2019 21   10/16/2018 22     ALT (SGPT) (IU/L)   Date Value   09/17/2009 24   12/31/2008 20   10/01/2008 29     ALT (U/L)   Date Value   09/09/2020 25   09/30/2019 17   01/18/2019 23   10/16/2018 24      Current Outpatient Medications   Medication Sig Dispense Refill    doxazosin 4 MG Oral Tab Take 1 tablet (4 mg total) by mouth nightly.      allopurinol 300 MG Oral Tab Take 1 tablet (300 mg total) by mouth  daily. 90 tablet 3    atorvastatin 40 MG Oral Tab Take 1 tablet (40 mg total) by mouth nightly.      carvedilol 3.125 MG Oral Tab Take 1 tablet (3.125 mg total) by mouth Q12H. TAKE WITH FOOD      bumetanide 1 MG Oral Tab Take 2 tablets (2 mg total) by mouth 2 (two) times daily.      amLODIPine 10 MG Oral Tab Take 1 tablet (10 mg total) by mouth daily.      Tiotropium Bromide Monohydrate (SPIRIVA RESPIMAT) 2.5 MCG/ACT Inhalation Aero Soln Inhale 2 puffs into the lungs daily.  0    Darbepoetin Lenny 100 MCG/0.5ML Injection Solution Prefilled Syringe 0.5 mL (100 mcg total) every 14 (fourteen) days.      Albuterol Sulfate  (90 Base) MCG/ACT Inhalation Aero Soln Inhale 2 puffs into the lungs every 4 (four) hours as needed for Wheezing or Shortness of Breath. 1 Inhaler 0    nitroGLYCERIN 0.4 MG Sublingual SL Tab Place 1 tablet (0.4 mg total) under the tongue every 5 (five) minutes as needed for Chest pain. 50 tablet 0    acetaminophen 500 MG Oral Tab Take 2 tablets (1,000 mg total) by mouth one time.      Ascorbic Acid (VITAMIN C) 100 MG Oral Tab Take 1 tablet (100 mg total) by mouth daily.      Levothyroxine Sodium (SYNTHROID, LEVOTHROID) 125 MCG Oral Tab Take 1 tablet (125 mcg total) by mouth before breakfast.      Cranberry 125 MG Oral Tab Take 1 tablet by mouth daily.      brimonidine Tartrate (ALPHAGAN) 0.2 % Ophthalmic Solution Place 1 drop into both eyes 3 (three) times daily.      Dorzolamide HCl (TRUSOPT) 2 % Ophthalmic Solution Place 1 drop into both eyes 3 (three) times daily.      latanoprost (XALATAN) 0.005 % Ophthalmic Solution Place 1 drop into both eyes 3 (three) times daily.      ASPIRIN 81 MG OR TABS 1 TABLET DAILY-pt to check with Dr. Zuñiga regarding taking/holding prior to procedure       No Known Allergies     Past Medical History:   Diagnosis Date    Back problem     stenosis    BPH (benign prostatic hypertrophy)     CAD (coronary artery disease)     Hx of CABG    COPD (chronic obstructive  pulmonary disease) (HCC)     not on continuous oxygen    Glaucoma     High blood pressure     High cholesterol     Hypothyroidism (acquired)     Neuropathy     right toe-     Osteoarthritis     Postoperative retention of urine     PVD (peripheral vascular disease) (HCC)       Past Surgical History:   Procedure Laterality Date    ANGIOPLASTY (CORONARY)      CABG  08/13/2014    At [facility]/ x4 vessels    [facility]ARACT B[facility]     [facility]    COLONOSCOPY  2007    [facility]    COLONOSCOPY N/A 01/10/2017    Procedure: COLONOSCOPY;  Surgeon: Dustin Watters MD;  Location:  ENDOSCOPY    ECHO 2D DP/CLRFL, CARDIO ([facility])  08/01/2023    Ejection fraction 60 to 65%, grade 1 diastolic dysfunction, normal pulmonary artery pressures    ECHO 2D, CARDIO ([facility])  05/31/2017    ECHO 2D, CARDIO (DMG)  03/04/2019    Ejection fraction 65%, mild aortic insufficiency    ECHO 2D, CARDIO (DM[facility])  12/23/2021    Ejection fraction greater than 55%, grade 2 diastolic dysfunction, moderate aortic stenosis, mild mitral regurgitation, mild dilatation of the left atrium    EGD N/A 01/10/2017    Procedure: ESOPHAGOGASTRODUODENOSCOPY (EGD);  Surgeon: Dustin Watters MD;  Location:  ENDOSCOPY    EGD  09/14/2022    HERNIA SURGERY  01/31/2020     UMBILICAL HERNIORRHAPHY     LEXISCAN NUC STRESS TST, CARDIO (DMG)  03/04/2019    neg perfusion scan, normal    OTHER      cyst removed from posterior neck    OTHER      x2 surgeries- ight and left rotator cuff repair    OTHER SURGICAL HISTORY      left leg angio    OTHER SURGICAL HISTORY      partial thyroidectomy    OTHER SURGICAL HISTORY Bilateral 02/01/2008    PTCA W/ stents SFA-legs    OTHER SURGICAL HISTORY Right 11/10/2015    right shoulder surgery    OTHER SURGICAL HISTORY  2017    cardiac ablation w/ Dr Sagastume for atrial flutter    OTHER SURGICAL HISTORY  12/27/2022    Left atrial appendage closure device    OTHER SURGICAL HISTORY  03/22/2023    Percutaneous balloon aortic valvuloplasty    US CAROTID  DOPPLER - DIAG IMG (CPT=93880)  07/10/2018    Less than 50% narrowing in bilateral internal carotid arteries      Family History   Problem Relation Age of Onset    Heart Disorder Brother     No Known Problems Brother         Social History     Socioeconomic History    Marital status:    Tobacco Use    Smoking status: Former     Packs/day: 2.00     Years: 40.00     Additional pack years: 0.00     Total pack years: 80.00     Types: Cigarettes     Quit date: 1995     Years since quittin.2    Smokeless tobacco: Never   Vaping Use    Vaping Use: Never used   Substance and Sexual Activity    Alcohol use: No    Drug use: No   Other Topics Concern    Caffeine Concern No    Exercise No    Seat Belt Yes    Special Diet No    Stress Concern No    Weight Concern No        Specialists:Dr Watters, Dr Leyva- GI, Dr Barbosa, Dr Self-cardiology, Dr Jerry-Cv surgery, Dr England- urology, Dr Hathaway- dermatology ( dreyer), [facility], Dr Chau- hematology,            no advanced directives  Occ: . : +. Children: 6  Exercise: minimal.  Diet: watches minimally     REVIEW OF SYSTEMS:   GENERAL: generally feels well, no fatigue, denies excessive daytime drowsiness, good appetite, wt down 16# over the past yr  SKIN: denies any unusual skin lesions or rashes, sees derm annually  EYES:denies blurred vision or double vision, glasses/ contacts: +, last eye exam: 2022 @ [facility]  ENT: denies nasal congestion, PND or ST, denies snoring and reported periods of apnea, denies hearing deficits  LUNGS: denies shortness of breath with exertion, no coughing or wheezing, uses spiriva respimat once daily in am, rarely uses rescue inhaler, breathing much improved since aortic valve replacement  CARDIOVASCULAR: denies chest pain on exertion, palpations, anginal equivalent symptoms, no peripheral edema, pt has implanted loop recorder, patient had a left atrial appendage closure device placed on 2022, not  checking bp  GI: denies abdominal pain,denies heartburn, constipation, diarrhea, or change in bowel habits  : denies dysuria, + hesitancy,nocturia x0- 1, + decreased urine stream intermittently, incontinence,  pt saw Dr England 9/5/23  MUSCULOSKELETAL: denies joint pain, +lower back discomfort off and on- takes tylenol  NEURO: denies headaches, tremors, dizziness, numbness and weakness  PSYCHE: denies depression or anxiety, denies any sleep difficulty,                   Have you often been bothered by feeling down, depressed of hopeless? no                  Have you often been bothered by little interest or pleasure in doing things? no  HEMATOLOGIC: denies unexplained bruising or bleeding, pt sees Dr Chau - Christi injection Q 2 weeks, last appointment 1/23/24  ENDOCRINE: see hpi,\"cold' all the time, denies heat intolerance , no unexplained wt loss or gain  EXAM:   /54 (BP Location: Left arm, Patient Position: Sitting)   Pulse 67   Temp 98 °F (36.7 °C)   Resp 18   Ht 5' 10.08\" (1.78 m)   Wt 163 lb (73.9 kg)   SpO2 97%   BMI 23.34 kg/m²   Body mass index is 23.34 kg/m².   GENERAL: well developed, well nourished,in no apparent distress  SKIN: no rashes,no suspicious lesions, scattered seborrheic keratoses throughout the trunk, left lower leg with venous stasis pigmentary changes, bruising left flank, tattoo right deltoid, mild subareolar soft breast tissue palpable bilaterally, freely movable, no axillary or supraclavicular lymphadenopathy palpable  ENT: EAC:  Clear, TMs: intact, Nose: turbinates normal, septum: midline, discharge: none, Pharynx: +upper denture plate and lower partial, no oral lesions  EYES:PERRLA, EOMI, normal optic disk  NECK: supple,no adenopathy,no bruits, no thyromegaly, negative Watts's maneuver, protracted posture, symmetrical range of motion  LUNGS: distant bs, clear to auscultation, no w/r/r  CARDIO: RRR with 1/6 systolic ejection murmur radiating to carotids bilaterally, no  S3, S4, + decrease left femoral pulse, faint pedal pulses bilaterally,+ trace pitting lower extremity edema bilaterally, right >L femoral bruit , soft abdominal bruit  GI: +NBS's,no masses, HSM or tenderness  : Deferred to urology  RECTAL: Deferred to urology  BACK:  : FROM, No lateral curves  EXTREMITIES: no cyanosis, clubbing , FROM of all joints tested  NEURO: A &O X 3,cranial nerves are intact,motor and sensory are grossly intact, DTRs +2/4 UE/LE bilaterally no sensory loss in both hands, negative Tinel's sign at wrist and elbows  PSYCH: affect: flat, speech: clear and coherent, slightly delayed, Insight: appropriate  ASSESSMENT AND PLAN:   Harrison Gonzalez is a 88 year old male   Encounter Diagnoses   Name Primary?    Encounter for annual health examination Yes    Centrilobular emphysema (HCC)     Essential hypertension     Paroxysmal atrial fibrillation (HCC)     Chronic systolic congestive heart failure (HCC)     S/P balloon aortic valvuloplasty- 3/22/23     Hypercholesteremia     PVD (peripheral vascular disease) (HCC)     Coronary artery disease involving native coronary artery of native heart without angina pectoris     Spinal stenosis of lumbar region without neurogenic claudication     Hypothyroidism (acquired)     Hyperuricemia     Anemia due to stage 3b chronic kidney disease ([facility])- hgb 10.9 on 1/23/24     Chronic kidney disease (CKD) stage G3b/A1, moderately decreased glomerular filtration rate (GFR) between 30-44 mL/min/1.73 square meter and albuminuria creatinine ratio less than 30 mg/g (HCC)     Benign prostatic hyperplasia without lower urinary tract symptoms     Venous insufficiency of both lower extremities     Subareolar gynecomastia in male     Paresthesias in right hand     S/P AVR      Orders Placed This Encounter   Procedures    Lipid Panel [E]    Uric Acid [905][Q]    TSH and Free T4 [E]     Meds & Refills for this Visit:  Requested Prescriptions      No prescriptions requested or  ordered in this encounter     Imaging & Consults:  None  No follow-ups on file.  Patient Instructions     Harrisonrafa Allenone's SCREENING SCHEDULE   Tests on this list are recommended by your physician but may not be covered, or covered at this frequency, by your insurer.   Please check with your insurance carrier before scheduling to verify coverage.   PREVENTATIVE SERVICES FREQUENCY &  COVERAGE DETAILS LAST COMPLETION DATE   Diabetes Screening    Fasting Blood Sugar / Glucose    One screening every 12 months if never tested or if previously tested but not diagnosed with pre-diabetes   One screening every 6 months if diagnosed with pre-diabetes Lab Results   Component Value Date    GLUCOSE 108 (H) 07/03/2014    GLU 96 09/09/2020        Cardiovascular Disease Screening    Lipid Panel  Cholesterol  Lipoprotein (HDL)  Triglycerides Covered every 5 years for all Medicare beneficiaries without apparent signs or symptoms of cardiovascular disease Lab Results   Component Value Date    CHOLEST 140 11/08/2022    HDL 48 11/08/2022    LDL 76 11/08/2022    TRIG 123 10/20/2021         Electrocardiogram (EKG)   Covered if needed at Welcome to Medicare, and non-screening if indicated for medical reasons 12/23/2019      Ultrasound Screening for Abdominal Aortic Aneurysm (AAA) Covered once in a lifetime for one of the following risk factors    Men who are 65-75 years old and have ever smoked    Anyone with a family history -     Colorectal Cancer Screening  Covered for ages 50-85; only need ONE of the following:    Colonoscopy   Covered every 10 years    Covered every 2 years if patient is at high risk or previous colonoscopy was abnormal -    No recommendations at this time    Flexible Sigmoidoscopy   Covered every 4 years -    Fecal Occult Blood Test Covered annually -   Prostate Cancer Screening    Prostate-Specific Antigen (PSA) Annually Lab Results   Component Value Date    PSA 3.4 09/17/2009     There are no preventive care  reminders to display for this patient.   Immunizations    Influenza Covered once per flu season  Please get every year 09/23/2023  No recommendations at this time    Pneumococcal Each vaccine ([id number] & [id number]) covered once after 65 Prevnar 13: 05/12/2015    Hovizkjyn27: 11/10/2009     No recommendations at this time    Hepatitis B One screening covered for patients with certain risk factors   -  No recommendations at this time    Tetanus Toxoid Not covered by Medicare Part B unless medically necessary (cut with metal); may be covered with your pharmacy prescription benefits -    Tetanus, Diptheria and Pertusis TD and TDaP Not covered by Medicare Part B -  No recommendations at this time    Zoster Not covered by Medicare Part B; may be covered with your pharmacy  prescription benefits 09/06/2011  No recommendations at this time     Annual Monitoring of Persistent Medications (ACE/ARB, digoxin diuretics, anticonvulsants)    Potassium Annually Lab Results   Component Value Date    K 5.0 09/09/2020         Creatinine   Annually Lab Results   Component Value Date    CREATSERUM 1.64 (H) 09/09/2020         BUN Annually Lab Results   Component Value Date    BUN 38 (H) 09/09/2020       Drug Serum Conc Annually No results found for: \"DIGOXIN\", \"DIG\", \"VALP\"         Chronic Obstructive Pulmonary Disease (COPD)    Spirometry Annually Spirometry date:      1. Encounter for annual health examination  I reviewed age-appropriate preventative health screening exams as well as immunizations and advanced directives.    2. Centrilobular emphysema (HCC)  Continue Spiriva, albuterol as needed.  Activity as tolerated.  Discussed the importance of daily walking regimen    3. Essential hypertension  I reviewed goals for blood pressure as well as conservative management of hypertension including sodium restriction, daily aerobic activity, alcohol moderation, smoking cessation, and maintaining ideal body weight.  Continue current  meds, patient encouraged to monitor his blood pressure several times per week    4. Paroxysmal atrial fibrillation (HCC)  Currently sinus rhythm following Watchman device placement    5. Chronic systolic congestive heart failure ([facility])  Monitor clinically, activity as tolerated, follow-up with cardiology as scheduled    6. S/P balloon aortic valvuloplasty- 3/22/23  Monitor clinically, follow-up with cardiology as scheduled    7. Hypercholesteremia  Reviewed goals for lipids.  Continue statin therapy, check labs annually  - Lipid Panel [E]; Future  - Lipid Panel [E]    8. PVD (peripheral vascular disease) ([facility])  Reviewed signs and symptoms of PAD, activity as tolerated, monitor clinically, continue aspirin therapy    9. Coronary artery disease involving native coronary artery of native heart without angina pectoris  Follow-up with cardiology as scheduled    10. Spinal stenosis of lumbar region without neurogenic claudication  Activity as tolerated, Tylenol as needed    11. Hypothyroidism (acquired)  Continue thyroid replacement therapy, check labs annually  - TSH and Free T4 [E]; Future  - TSH and Free T4 [E]    12. Hyperuricemia  Continue allopurinol, increase daily fluid intake and check labs annually  - Uric Acid [905][Q]; Future  - Uric Acid [905][Q]    13. Anemia due to stage 3b chronic kidney disease ([facility])- hgb 10.9 on 1/23/24  Continue Procrit injections, follow-up with hematology    14. Chronic kidney disease (CKD) stage G3b/A1, moderately decreased glomerular filtration rate (GFR) between 30-44 mL/min/1.73 square meter and albuminuria creatinine ratio less than 30 mg/g ([facility])  Discussed importance of increased daily fluid intake and avoidance of potentially nephrotoxic drugs such as frequent NSAID use    15. Benign prostatic hyperplasia without lower urinary tract symptoms  Reviewed signs and symptoms of lower urinary tract outlet obstruction, continue doxazosin, follow-up with Dr. England annually    16.  Venous insufficiency of both lower extremities  Monitor clinically, elevate legs at rest    17. Subareolar gynecomastia in male  I advised patient I suspect there is gynecomastia secondary to prior finasteride use it in the setting of significant weight loss is more apparent at this time.  No further investigation indicated at this time    18. Paresthesias in right hand  Discussed likely positional nature.  Discussed proper neck posture exercises    19. S/P AVR  Follow-up with cardiology, echo periodically    Karthik Christopher, , FAAFP

## 2024-02-13 ENCOUNTER — TELEPHONE (OUTPATIENT)
Dept: FAMILY MEDICINE CLINIC | Facility: CLINIC | Age: 89
End: 2024-02-13

## 2024-02-13 NOTE — TELEPHONE ENCOUNTER
Bette from Dr. Strickland office called and they are looking for pts most recent labs, they are requesting that they be faxed for Dr. Whitmore to review Fax:[phone]

## 2024-02-13 NOTE — TELEPHONE ENCOUNTER
Called Dr. Whitmore's office to confirm that labs were needed.  Pt is there at office now and provider needs to view labs.  Labs from 1/24/24 faxed

## 2024-04-29 RX ORDER — BUMETANIDE 1 MG/1
1 TABLET ORAL 2 TIMES DAILY
Qty: 180 TABLET | Refills: 0 | Status: SHIPPED | OUTPATIENT
Start: 2024-04-29

## 2024-05-06 RX ORDER — ALLOPURINOL 300 MG/1
300 TABLET ORAL DAILY
Qty: 90 TABLET | Refills: 3 | Status: SHIPPED | OUTPATIENT
Start: 2024-05-06

## 2024-05-06 NOTE — TELEPHONE ENCOUNTER
Last refill: 05/11/23  qtY: 90  W/ 3 refills  Last ov: 01/24/24    Requested Prescriptions     Pending Prescriptions Disp Refills    ALLOPURINOL 300 MG Oral Tab [Pharmacy Med Name: Allopurinol 300 MG Oral Tablet] 90 tablet 0     Sig: Take 1 tablet by mouth once daily     No future appointments.

## 2024-08-06 ENCOUNTER — TELEPHONE (OUTPATIENT)
Dept: FAMILY MEDICINE CLINIC | Facility: CLINIC | Age: 89
End: 2024-08-06

## 2024-08-06 NOTE — TELEPHONE ENCOUNTER
Yovana from [facility] is calling to see if Dr. Christopher followed this patient around April 2023.  They have a POC that needs to be signed.  She is going to fax it over for review.  I told her he is not in the office until tomorrow, she is okay with that.

## 2024-08-15 ENCOUNTER — HOME HEALTH CHARGES (OUTPATIENT)
Dept: FAMILY MEDICINE CLINIC | Facility: CLINIC | Age: 89
End: 2024-08-15

## 2024-08-15 DIAGNOSIS — I13.0 HYPERTENSIVE HEART AND RENAL DISEASE WITH CONGESTIVE HEART FAILURE (HCC): Primary | ICD-10-CM

## 2024-09-24 ENCOUNTER — OFFICE VISIT (OUTPATIENT)
Dept: FAMILY MEDICINE CLINIC | Facility: CLINIC | Age: 89
End: 2024-09-24
Payer: MEDICARE

## 2024-09-24 VITALS
RESPIRATION RATE: 16 BRPM | BODY MASS INDEX: 23.71 KG/M2 | WEIGHT: 165.63 LBS | HEART RATE: 82 BPM | HEIGHT: 70 IN | DIASTOLIC BLOOD PRESSURE: 68 MMHG | OXYGEN SATURATION: 96 % | TEMPERATURE: 98 F | SYSTOLIC BLOOD PRESSURE: 138 MMHG

## 2024-09-24 DIAGNOSIS — I50.43 ACUTE ON CHRONIC COMBINED SYSTOLIC AND DIASTOLIC HEART FAILURE (HCC): ICD-10-CM

## 2024-09-24 DIAGNOSIS — I10 ESSENTIAL HYPERTENSION: Primary | ICD-10-CM

## 2024-09-24 DIAGNOSIS — I13.0 HYPERTENSIVE HEART AND RENAL DISEASE WITH CONGESTIVE HEART FAILURE (HCC): ICD-10-CM

## 2024-09-24 DIAGNOSIS — J43.2 CENTRILOBULAR EMPHYSEMA (HCC): ICD-10-CM

## 2024-09-24 DIAGNOSIS — I48.0 PAROXYSMAL ATRIAL FIBRILLATION (HCC): ICD-10-CM

## 2024-09-24 DIAGNOSIS — G31.84 MCI (MILD COGNITIVE IMPAIRMENT): ICD-10-CM

## 2024-09-24 DIAGNOSIS — N18.4 ANEMIA DUE TO STAGE 4 CHRONIC KIDNEY DISEASE (HCC): ICD-10-CM

## 2024-09-24 DIAGNOSIS — I25.10 CORONARY ARTERY DISEASE INVOLVING NATIVE CORONARY ARTERY OF NATIVE HEART WITHOUT ANGINA PECTORIS: ICD-10-CM

## 2024-09-24 DIAGNOSIS — D63.1 ANEMIA DUE TO STAGE 4 CHRONIC KIDNEY DISEASE (HCC): ICD-10-CM

## 2024-09-24 DIAGNOSIS — N18.4 CHRONIC KIDNEY DISEASE (CKD) STAGE G4/A1, SEVERELY DECREASED GLOMERULAR FILTRATION RATE (GFR) BETWEEN 15-29 ML/MIN/1.73 SQUARE METER AND ALBUMINURIA CREATININE RATIO LESS THAN 30 MG/G (HCC): ICD-10-CM

## 2024-09-24 PROCEDURE — G2211 COMPLEX E/M VISIT ADD ON: HCPCS | Performed by: FAMILY MEDICINE

## 2024-09-24 PROCEDURE — 99214 OFFICE O/P EST MOD 30 MIN: CPT | Performed by: FAMILY MEDICINE

## 2024-09-24 RX ORDER — CARVEDILOL 6.25 MG/1
6.25 TABLET ORAL 2 TIMES DAILY WITH MEALS
COMMUNITY
Start: 2024-08-18

## 2024-09-24 RX ORDER — HYDRALAZINE HYDROCHLORIDE 50 MG/1
50 TABLET, FILM COATED ORAL 3 TIMES DAILY
COMMUNITY
Start: 2024-08-25

## 2024-09-24 RX ORDER — ISOSORBIDE DINITRATE 10 MG/1
10 TABLET ORAL 3 TIMES DAILY
COMMUNITY
Start: 2024-08-25

## 2024-09-24 NOTE — PROGRESS NOTES
Harrison Gonzalez is a 89 year old male.  Chief Complaint   Patient presents with    Hospital F/U     Admitted to [facility] for 3 days      Here w/ wife  HPI:   Pt was admitted to [facility]  Date of Admission: 8/22/2024  Date of Discharge: 8/25/24   Admission Chief Complaint:  SOB    Discharge Diagnoses:  Hypertension 08/24/2024   Acute pulmonary edema (CMS-HCC) 08/23/2024   Acute on chronic combined systolic and diastolic heart failure (CMS-HCC) 08/23/2024   Elevated troponin 08/23/2024   Chronic kidney disease, stage 3 unspecified (CMS-HCC) 12/26/2023   S/P TAVR (transcatheter aortic valve replacement) 09/16/2023   HLD (hyperlipidemia) 08/27/2014   Hypothyroidism 08/27/2014   PAD (peripheral artery disease) (CMS-HCC) 08/27/2014   CAD in native artery 08/27/2014     Hospital Course:  Pt admitted to the hospital with CHF exacerbation; patient presenting with classic symptoms of orthopnea, dyspnea on exertion.  Presenting with acute pulmonary edema in the emergency room, likely related to acute on chronic heart failure with preserved ejection fraction.  Acute on chronic heart failure- patient does have a slight decline in ejection fraction, diuretics adjusted.  Echocardiogram showed ejection fraction 45 to 50% with mildly dilated left and right atrium, mild to moderate mitral regurgitation, large pleural effusion and bioprosthetic aortic valve in position  Patient did have improvement in symptoms.  Diuresed well.  Appropriate cardiac medications adjusted; patient discharged in the hospital in stable condition with outpatient follow-up.  Patient was strongly recommended to maintain compliance with medication.  Patient's acute presentation was likely related to increased water intake and increased consumption of watermelon.  Patient was strongly recommended to limit water intake to 1500 cc.  Watch sodium intake.  Outpatient follow-up.  Chronic condition was stable.  No additional medication adjustments are  warranted.    Current Outpatient Medications   Medication Sig Dispense Refill    hydrALAZINE 50 MG Oral Tab Take 1 tablet (50 mg total) by mouth 3 (three) times daily.      isosorbide dinitrate 10 MG Oral Tab Take 1 tablet (10 mg total) by mouth 3 (three) times daily.      carvedilol 6.25 MG Oral Tab Take 1 tablet (6.25 mg total) by mouth 2 (two) times daily with meals.      allopurinol 300 MG Oral Tab Take 1 tablet (300 mg total) by mouth daily. 90 tablet 3    bumetanide 1 MG Oral Tab Take 1 tablet (1 mg total) by mouth 2 (two) times daily. 180 tablet 0    atorvastatin 40 MG Oral Tab Take 1 tablet (40 mg total) by mouth nightly.      amLODIPine 10 MG Oral Tab Take 1 tablet (10 mg total) by mouth daily.      Darbepoetin Lenny 100 MCG/0.5ML Injection Solution Prefilled Syringe 0.5 mL (100 mcg total) every 14 (fourteen) days.      acetaminophen 500 MG Oral Tab Take 2 tablets (1,000 mg total) by mouth one time.      Ascorbic Acid (VITAMIN C) 100 MG Oral Tab Take 1 tablet (100 mg total) by mouth daily.      Levothyroxine Sodium (SYNTHROID, LEVOTHROID) 125 MCG Oral Tab Take 1 tablet (125 mcg total) by mouth before breakfast.      Cranberry 125 MG Oral Tab Take 1 tablet by mouth daily.      brimonidine Tartrate (ALPHAGAN) 0.2 % Ophthalmic Solution Place 1 drop into both eyes 3 (three) times daily.      Dorzolamide HCl (TRUSOPT) 2 % Ophthalmic Solution Place 1 drop into both eyes 3 (three) times daily.      latanoprost (XALATAN) 0.005 % Ophthalmic Solution Place 1 drop into both eyes 3 (three) times daily.      ASPIRIN 81 MG OR TABS 1 TABLET DAILY-pt to check with Dr. Zuñiga regarding taking/holding prior to procedure      doxazosin 4 MG Oral Tab Take 1 tablet (4 mg total) by mouth nightly. (Patient not taking: Reported on 9/24/2024)      Tiotropium Bromide Monohydrate (SPIRIVA RESPIMAT) 2.5 MCG/ACT Inhalation Aero Soln Inhale 2 puffs into the lungs daily. (Patient not taking: Reported on 9/24/2024)  0    Albuterol Sulfate   (90 Base) MCG/ACT Inhalation Aero Soln Inhale 2 puffs into the lungs every 4 (four) hours as needed for Wheezing or Shortness of Breath. (Patient not taking: Reported on 2024) 1 Inhaler 0    nitroGLYCERIN 0.4 MG Sublingual SL Tab Place 1 tablet (0.4 mg total) under the tongue every 5 (five) minutes as needed for Chest pain. (Patient not taking: Reported on 2024) 50 tablet 0      Past Medical History:    Back problem    stenosis    BPH (benign prostatic hypertrophy)    CAD (coronary artery disease)    Hx of CABG    COPD (chronic obstructive pulmonary disease) ([facility])    not on continuous oxygen    Glaucoma    High blood pressure    High cholesterol    Hypothyroidism (acquired)    Neuropathy    right toe-     Osteoarthritis    Postoperative retention of urine    PVD (peripheral vascular disease) ([facility])      Social History:  Social History     Socioeconomic History    Marital status:    Tobacco Use    Smoking status: Former     Current packs/day: 0.00     Average packs/day: 2.0 packs/day for 40.0 years (80.0 ttl pk-yrs)     Types: Cigarettes     Start date: 1955     Quit date: 1995     Years since quittin.9    Smokeless tobacco: Never   Vaping Use    Vaping status: Never Used   Substance and Sexual Activity    Alcohol use: No    Drug use: No   Other Topics Concern    Caffeine Concern No    Exercise No    Seat Belt Yes    Special Diet No    Stress Concern No    Weight Concern No     Social Determinants of Health     Financial Resource Strain: Not At Risk (2022)    [facility], [facility]    Financial Resource Strain     How hard is it for you to pay for the very basics like food, housing, medical care, and heating?: Not hard at all   Food Insecurity: No Food Insecurity (2024)    Received from [facility]    Food Insecurity     Currently or in the past 3 months, have you worried your food would run out  before you had money to buy more?: No     In the past 12 months, have you run out of food or been unable to get more?: No   Transportation Needs: No Transportation Needs (8/23/2024)    Received from [facility]    Transportation Needs     Medical Transportation Needs?: No    Received from [facility], [facility]    Social Connections    Received from [facility], [facility]    Housing Stability        REVIEW OF SYSTEMS:   GENERAL HEALTH: feels well otherwise, denies fatigue, appetite ok, baseline wt 160#, 157# today at home  SKIN: denies any unusual skin lesions or rashes  ENT: denies nasal congestion, pnd, sore throat, ear pain or pressure, decreased hearing  RESPIRATORY: Stable shortness of breath with exertion,cough, wheezing, patient states he has not used or needed inhalers  CARDIOVASCULAR: denies chest pain on exertion, stable edema, patient saw Dr. Barbosa's nurse 9/12/2025, patient has a 50 ounce fluid restriction  GI: denies abdominal pain and denies heartburn  NEURO: denies headaches  PSYCH: denies feeling stressed or anxious, patient's wife has concerns about his short-term memory which seems to wax and wane since the hospitalization.  This does not affect his ability to drive or to function at home but he often has to ask where he was supposed to go or what he was supposed to do.  Patient's wife manages his medications so there is been no problems with compliance  Hematology: Patient continues to get Aranesp injections, last hemoglobin 10.3  EXAM:   /68   Pulse 82   Temp 97.6 °F (36.4 °C) (Temporal)   Resp 16   Ht 5' 10\" (1.778 m)   Wt 165 lb 9.6 oz (75.1 kg)   SpO2 96%   BMI 23.76 kg/m²   GENERAL: well developed, well nourished,in no apparent distress, well hydrated  SKIN: no rashes,no suspicious lesions  ENT: TMs: intact, good mobility, Nose: turbinates clear, no dc, Throat: no erythema, pnd, or  lesions  NECK: supple,no adenopathy,no bruits, no thyromegaly  LUNGS: Distant breath sounds, no use of accessory muscles, no signs of respiratory distress  CARDIO: RRR with 2/6 systolic ejection murmur, peripheral pulses intact, trace pitting pretibial edema bilaterally  GI: good BS's,no masses, HSM or tenderness  Neuro: Alert and oriented x 3, cranial nerves II through XII intact, nonfocal exam, recall 0/3 after 3 minutes, remote memory intact, calculations intact    ASSESSMENT AND PLAN:     Encounter Diagnoses   Name Primary?    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    Meds & Refills for this Visit:  Requested Prescriptions      No prescriptions requested or ordered in this encounter     Imaging & Consults:  None  No follow-ups on file.  Patient Instructions   1. Essential hypertension  I reviewed goals for blood pressure as well as conservative management of hypertension including sodium restriction, daily aerobic activity, alcohol moderation, smoking cessation, and maintaining ideal body weight.  Continue current meds, patient encouraged to monitor blood pressure at home    2. Acute on chronic combined systolic and diastolic heart failure (HCC)  Discussed importance of daily weights monitoring.  Follow sodium and fluid restriction per cardiology.   Continue current meds and cardiology follow-up as scheduled    3. Centrilobular emphysema (HCC)  Monitor clinically, reviewed previously prescribed inhaled medications    4. Paroxysmal atrial fibrillation (HCC)  Continue current meds and monitoring    5. 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 Coronary artery disease involving native coronary artery of native heart without angina pectoris  Continue current meds, monitor clinically    7. Chronic kidney disease (CKD) stage G4/A1, severely decreased glomerular filtration rate (GFR) between 15-29 mL/min/1.73 square meter and albuminuria creatinine ratio less than 30 mg/g (HCC)  Repeat BMP 1 month    8. Anemia due to stage 4 chronic kidney disease (HCC)  Continue Aranesp injections    9. MCI (mild cognitive impairment)  Discussed diagnosis of age-related changes, MCI versus dementia.  Discussed importance of visual memory aids as well as frequent reminders.  Discussed safety concerns, monitor clinically     The patient indicates understanding of these issues and agrees to the plan.    Karthik Christopher DO, FAAFP

## 2024-09-24 NOTE — PATIENT INSTRUCTIONS
1. Essential hypertension  I reviewed goals for blood pressure as well as conservative management of hypertension including sodium restriction, daily aerobic activity, alcohol moderation, smoking cessation, and maintaining ideal body weight.  Continue current meds, patient encouraged to monitor blood pressure at home    2. Acute on chronic combined systolic and diastolic heart failure (HCC)  Discussed importance of daily weights monitoring.  Follow sodium and fluid restriction per cardiology.  Continue current meds and cardiology follow-up as scheduled    3. Centrilobular emphysema (HCC)  Monitor clinically, reviewed previously prescribed inhaled medications    4. Paroxysmal atrial fibrillation (HCC)  Continue current meds and monitoring    5. 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 Coronary artery disease involving native coronary artery of native heart without angina pectoris  Continue current meds, monitor clinically    7. Chronic kidney disease (CKD) stage G4/A1, severely decreased glomerular filtration rate (GFR) between 15-29 mL/min/1.73 square meter and albuminuria creatinine ratio less than 30 mg/g (HCC)  Repeat BMP 1 month    8. Anemia due to stage 4 chronic kidney disease (HCC)  Continue Aranesp injections    9. MCI (mild cognitive impairment)  Discussed diagnosis of age-related changes, MCI versus dementia.  Discussed importance of visual memory aids as well as frequent reminders.  Discussed safety concerns, monitor clinically

## 2025-01-01 ENCOUNTER — TELEPHONE (OUTPATIENT)
Dept: FAMILY MEDICINE CLINIC | Facility: CLINIC | Age: OVER 89
End: 2025-01-01

## 2025-01-15 ENCOUNTER — MED REC SCAN ONLY (OUTPATIENT)
Dept: FAMILY MEDICINE CLINIC | Facility: CLINIC | Age: OVER 89
End: 2025-01-15

## 2025-01-24 ENCOUNTER — LABORATORY ENCOUNTER (OUTPATIENT)
Dept: LAB | Age: OVER 89
End: 2025-01-24
Attending: FAMILY MEDICINE
Payer: MEDICARE

## 2025-01-24 ENCOUNTER — OFFICE VISIT (OUTPATIENT)
Dept: FAMILY MEDICINE CLINIC | Facility: CLINIC | Age: OVER 89
End: 2025-01-24
Payer: MEDICARE

## 2025-01-24 VITALS
HEART RATE: 60 BPM | WEIGHT: 162 LBS | SYSTOLIC BLOOD PRESSURE: 134 MMHG | TEMPERATURE: 98 F | HEIGHT: 69.69 IN | OXYGEN SATURATION: 96 % | RESPIRATION RATE: 16 BRPM | DIASTOLIC BLOOD PRESSURE: 60 MMHG | BODY MASS INDEX: 23.45 KG/M2

## 2025-01-24 DIAGNOSIS — Z95.2 S/P AVR: ICD-10-CM

## 2025-01-24 DIAGNOSIS — I48.0 PAROXYSMAL ATRIAL FIBRILLATION (HCC): ICD-10-CM

## 2025-01-24 DIAGNOSIS — Z98.890 S/P BALLOON AORTIC VALVULOPLASTY: ICD-10-CM

## 2025-01-24 DIAGNOSIS — Z00.00 ENCOUNTER FOR ANNUAL HEALTH EXAMINATION: Primary | ICD-10-CM

## 2025-01-24 DIAGNOSIS — I73.9 PVD (PERIPHERAL VASCULAR DISEASE): ICD-10-CM

## 2025-01-24 DIAGNOSIS — E79.0 HYPERURICEMIA: ICD-10-CM

## 2025-01-24 DIAGNOSIS — E78.00 HYPERCHOLESTEREMIA: ICD-10-CM

## 2025-01-24 DIAGNOSIS — N40.0 BENIGN PROSTATIC HYPERPLASIA WITHOUT LOWER URINARY TRACT SYMPTOMS: ICD-10-CM

## 2025-01-24 DIAGNOSIS — M48.061 SPINAL STENOSIS OF LUMBAR REGION WITHOUT NEUROGENIC CLAUDICATION: ICD-10-CM

## 2025-01-24 DIAGNOSIS — I25.10 CORONARY ARTERY DISEASE INVOLVING NATIVE CORONARY ARTERY OF NATIVE HEART WITHOUT ANGINA PECTORIS: ICD-10-CM

## 2025-01-24 DIAGNOSIS — E03.9 HYPOTHYROIDISM (ACQUIRED): ICD-10-CM

## 2025-01-24 DIAGNOSIS — D69.6 THROMBOCYTOPENIA: ICD-10-CM

## 2025-01-24 DIAGNOSIS — D63.1 ANEMIA DUE TO STAGE 3B CHRONIC KIDNEY DISEASE (HCC): ICD-10-CM

## 2025-01-24 DIAGNOSIS — I10 ESSENTIAL HYPERTENSION: ICD-10-CM

## 2025-01-24 DIAGNOSIS — N18.32 CHRONIC KIDNEY DISEASE (CKD) STAGE G3B/A1, MODERATELY DECREASED GLOMERULAR FILTRATION RATE (GFR) BETWEEN 30-44 ML/MIN/1.73 SQUARE METER AND ALBUMINURIA CREATININE RATIO LESS THAN 30 MG/G (HCC): ICD-10-CM

## 2025-01-24 DIAGNOSIS — J09.X1 INFLUENZA A WITH PNEUMONIA: ICD-10-CM

## 2025-01-24 DIAGNOSIS — I87.2 VENOUS INSUFFICIENCY OF BOTH LOWER EXTREMITIES: ICD-10-CM

## 2025-01-24 DIAGNOSIS — J43.2 CENTRILOBULAR EMPHYSEMA (HCC): ICD-10-CM

## 2025-01-24 DIAGNOSIS — I50.22 CHRONIC SYSTOLIC CONGESTIVE HEART FAILURE (HCC): ICD-10-CM

## 2025-01-24 DIAGNOSIS — N18.32 ANEMIA DUE TO STAGE 3B CHRONIC KIDNEY DISEASE (HCC): ICD-10-CM

## 2025-01-24 PROBLEM — R35.1 NOCTURIA: Status: RESOLVED | Noted: 2022-01-06 | Resolved: 2025-01-01

## 2025-01-24 PROBLEM — N52.9 IMPOTENCE OF ORGANIC ORIGIN: Status: RESOLVED | Noted: 2021-09-08 | Resolved: 2025-01-24

## 2025-01-24 PROBLEM — R33.9 RETENTION OF URINE: Status: RESOLVED | Noted: 2022-01-06 | Resolved: 2025-01-24

## 2025-01-24 PROBLEM — R35.1 NOCTURIA: Status: RESOLVED | Noted: 2022-01-06 | Resolved: 2025-01-24

## 2025-01-24 PROBLEM — N52.9 IMPOTENCE OF ORGANIC ORIGIN: Status: RESOLVED | Noted: 2021-09-08 | Resolved: 2025-01-01

## 2025-01-24 PROBLEM — R39.12 WEAK URINARY STREAM: Status: RESOLVED | Noted: 2022-01-06 | Resolved: 2025-01-01

## 2025-01-24 PROBLEM — I35.0 MODERATE AORTIC STENOSIS: Status: RESOLVED | Noted: 2020-08-11 | Resolved: 2025-01-01

## 2025-01-24 PROBLEM — I50.43 ACUTE ON CHRONIC COMBINED SYSTOLIC AND DIASTOLIC HEART FAILURE (HCC): Status: RESOLVED | Noted: 2024-01-01 | Resolved: 2025-01-01

## 2025-01-24 PROBLEM — I50.43 ACUTE ON CHRONIC COMBINED SYSTOLIC AND DIASTOLIC HEART FAILURE (HCC): Status: RESOLVED | Noted: 2024-08-23 | Resolved: 2025-01-24

## 2025-01-24 PROBLEM — R33.9 RETENTION OF URINE: Status: RESOLVED | Noted: 2022-01-06 | Resolved: 2025-01-01

## 2025-01-24 PROBLEM — R39.12 WEAK URINARY STREAM: Status: RESOLVED | Noted: 2022-01-06 | Resolved: 2025-01-24

## 2025-01-24 PROBLEM — I35.0 MODERATE AORTIC STENOSIS: Status: RESOLVED | Noted: 2020-08-11 | Resolved: 2025-01-24

## 2025-01-24 LAB
CHOLEST SERPL-MCNC: 128 MG/DL (ref ?–200)
FASTING PATIENT LIPID ANSWER: NO
HDLC SERPL-MCNC: 53 MG/DL (ref 40–59)
LDLC SERPL CALC-MCNC: 61 MG/DL (ref ?–100)
NONHDLC SERPL-MCNC: 75 MG/DL (ref ?–130)
T4 FREE SERPL-MCNC: 1.6 NG/DL (ref 0.8–1.7)
TRIGL SERPL-MCNC: 71 MG/DL (ref 30–149)
TSI SER-ACNC: 4.44 UIU/ML (ref 0.55–4.78)
URATE SERPL-MCNC: 3.5 MG/DL
VLDLC SERPL CALC-MCNC: 10 MG/DL (ref 0–30)

## 2025-01-24 PROCEDURE — 80061 LIPID PANEL: CPT

## 2025-01-24 PROCEDURE — 84439 ASSAY OF FREE THYROXINE: CPT

## 2025-01-24 PROCEDURE — 84550 ASSAY OF BLOOD/URIC ACID: CPT

## 2025-01-24 PROCEDURE — 99214 OFFICE O/P EST MOD 30 MIN: CPT | Performed by: FAMILY MEDICINE

## 2025-01-24 PROCEDURE — G0439 PPPS, SUBSEQ VISIT: HCPCS | Performed by: FAMILY MEDICINE

## 2025-01-24 PROCEDURE — 84443 ASSAY THYROID STIM HORMONE: CPT

## 2025-01-24 PROCEDURE — 36415 COLL VENOUS BLD VENIPUNCTURE: CPT

## 2025-01-24 PROCEDURE — 99499 UNLISTED E&M SERVICE: CPT | Performed by: FAMILY MEDICINE

## 2025-01-24 RX ORDER — TAMSULOSIN HYDROCHLORIDE 0.4 MG/1
0.4 CAPSULE ORAL DAILY
COMMUNITY

## 2025-01-24 NOTE — H&P
Harrison Gonzalez is a 89 year old male   Chief Complaint   Patient presents with    Physical     Medicare AWV  Reviewed Preventative/Wellness form with patient.      Hospital F/U    Flu    COPD    HTN    Kidney Problem    Lipids       HPI:   Pt recently hospitalized at [facility] from 1/4 through 1/7 with influenza A and subsequent complications.  He was discharged on cefuroxime and Tamiflu which she has since completed.  His hemoglobin was 10.0 and platelets 100,000, GFR 32 at that time.  High sensitive troponin was minimally elevated.  BNP was elevated as well..    Wt Readings from Last 6 Encounters:   01/24/25 162 lb (73.5 kg)   09/24/24 165 lb 9.6 oz (75.1 kg)   01/24/24 163 lb (73.9 kg)   09/20/23 161 lb 8 oz (73.3 kg)   04/11/23 151 lb (68.5 kg)   01/11/23 179 lb (81.2 kg)     Body mass index is 23.46 kg/m².     Cholesterol, Total (mg/dL)   Date Value   01/24/2024 139   10/20/2021 148   09/09/2020 132   04/13/2017 133   09/17/2009 131   12/31/2008 141     CHOLESTEROL, TOTAL (no units)   Date Value   11/08/2022 140     HDL Cholesterol (mg/dL)   Date Value   01/24/2024 61 (H)   10/20/2021 53   09/09/2020 52   09/17/2009 42   12/31/2008 48   06/18/2008 46     HDL CHOL (no units)   Date Value   11/08/2022 48   04/13/2017 48     LDL Cholesterol Calc (mg/dL)   Date Value   09/17/2009 67   12/31/2008 66   06/18/2008 61     LDL Cholesterol (mg/dL)   Date Value   01/24/2024 59   11/08/2022 76   10/20/2021 73   09/09/2020 67   04/13/2017 68     Triglycerides (mg/dL)   Date Value   09/17/2009 110   12/31/2008 135   06/18/2008 84     AST (SGOT) (IU/L)   Date Value   09/17/2009 22   12/31/2008 23   10/01/2008 42 (H)     AST (U/L)   Date Value   09/09/2020 20   09/30/2019 15   01/18/2019 21   10/16/2018 22     ALT (SGPT) (IU/L)   Date Value   09/17/2009 24   12/31/2008 20   10/01/2008 29     ALT (U/L)   Date Value   09/09/2020 25   09/30/2019 17   01/18/2019 23   10/16/2018 24      Current Outpatient Medications    Medication Sig Dispense Refill    tamsulosin 0.4 MG Oral Cap Take 1 capsule (0.4 mg total) by mouth daily.      hydrALAZINE 50 MG Oral Tab Take 1 tablet (50 mg total) by mouth 3 (three) times daily.      isosorbide dinitrate 10 MG Oral Tab Take 1 tablet (10 mg total) by mouth 3 (three) times daily.      carvedilol 6.25 MG Oral Tab Take 1 tablet (6.25 mg total) by mouth 2 (two) times daily with meals.      allopurinol 300 MG Oral Tab Take 1 tablet (300 mg total) by mouth daily. 90 tablet 3    bumetanide 1 MG Oral Tab Take 1 tablet (1 mg total) by mouth 2 (two) times daily. 180 tablet 0    atorvastatin 40 MG Oral Tab Take 1 tablet (40 mg total) by mouth nightly.      amLODIPine 10 MG Oral Tab Take 1 tablet (10 mg total) by mouth in the morning and 1 tablet (10 mg total) before bedtime.      Tiotropium Bromide Monohydrate (SPIRIVA RESPIMAT) 2.5 MCG/ACT Inhalation Aero Soln Inhale 2 puffs into the lungs daily.  0    Darbepoetin Lenny 100 MCG/0.5ML Injection Solution Prefilled Syringe 0.5 mL (100 mcg total) every 14 (fourteen) days.      Albuterol Sulfate  (90 Base) MCG/ACT Inhalation Aero Soln Inhale 2 puffs into the lungs every 4 (four) hours as needed for Wheezing or Shortness of Breath. 1 Inhaler 0    nitroGLYCERIN 0.4 MG Sublingual SL Tab Place 1 tablet (0.4 mg total) under the tongue every 5 (five) minutes as needed for Chest pain. 50 tablet 0    Ascorbic Acid (VITAMIN C) 100 MG Oral Tab Take 1 tablet (100 mg total) by mouth daily.      Levothyroxine Sodium (SYNTHROID, LEVOTHROID) 125 MCG Oral Tab Take 1 tablet (125 mcg total) by mouth before breakfast.      Cranberry 125 MG Oral Tab Take 1 tablet by mouth daily.      brimonidine Tartrate (ALPHAGAN) 0.2 % Ophthalmic Solution Place 1 drop into both eyes 3 (three) times daily.      Dorzolamide HCl (TRUSOPT) 2 % Ophthalmic Solution Place 1 drop into both eyes 3 (three) times daily.      latanoprost (XALATAN) 0.005 % Ophthalmic Solution Place 1 drop into both  eyes 3 (three) times daily.      empagliflozin 25 MG Oral Tab Take 0.5 tablets (12.5 mg total) by mouth.      doxazosin 4 MG Oral Tab Take 1 tablet (4 mg total) by mouth nightly. (Patient not taking: Reported on 9/24/2024)      acetaminophen 500 MG Oral Tab Take 2 tablets (1,000 mg total) by mouth one time.      ASPIRIN 81 MG OR TABS 1 TABLET DAILY-pt to check with Dr. Zuñiga regarding taking/holding prior to procedure       Allergies[1]     Past Medical History:    Back problem    stenosis    BPH (benign prostatic hypertrophy)    CAD (coronary artery disease)    Hx of CABG    COPD (chronic obstructive pulmonary disease) (HCC)    not on continuous oxygen    Glaucoma    High blood pressure    High cholesterol    Hypothyroidism (acquired)    Neuropathy    right toe-     Osteoarthritis    Postoperative retention of urine    PVD (peripheral vascular disease) ([facility])      Past Surgical History:   Procedure Laterality Date    Angioplasty (coronary)      Cabg  08/13/2014    [facility]/ [facility] vessels    [facility]     [facility]    [facility]  2007    [facility]    Colonoscopy N/A 01/10/2017    Procedure: COLONOSCOPY;  Surgeon: Dustin Watters MD;  Location:  ENDOSCOPY    Echo 2d dp/clrfl, cardio (dmg)  08/01/2023    Ejection fraction 60 to 65%, grade 1 diastolic dysfunction, normal pulmonary artery pressures    Echo 2d dp/clrfl, cardio (dmg)  01/06/2025    Ejection fraction 45%, grade 2 diastolic dysfunction, bioprosthetic aortic valve in place, mild mitral regurgitation    Echo 2d, cardio (dmg)  05/31/2017    Echo 2d, cardio (dmg)  03/04/2019    Ejection fraction 65%, mild aortic insufficiency    Echo 2d, cardio (dmg)  12/23/2021    Ejection fraction greater than 55%, grade 2 diastolic dysfunction, moderate aortic stenosis, mild mitral regurgitation, mild dilatation of the left atrium    Egd N/A 01/10/2017    Procedure: ESOPHAGOGASTRODUODENOSCOPY (EGD);  Surgeon: Dustin Watters MD;  Location:  ENDOSCOPY    Egd   2022    Hernia surgery  2020     UMBILICAL HERNIORRHAPHY     Lexiscan nuc stress tst, cardio (dmg)  2019    neg perfusion scan, normal    Other      cyst removed from posterior neck    Other      x2 surgeries- ight and left rotator cuff repair    Other surgical history      left leg angio    Other surgical history      partial thyroidectomy    Other surgical history Bilateral 2008    PTCA W/ stents SFA-legs    Other surgical history Right 11/10/2015    right shoulder surgery    Other surgical history  2017    cardiac ablation w/ Dr Sagastume for atrial flutter    Other surgical history  2022    Left atrial appendage closure device    Other surgical history  2023    Percutaneous balloon aortic valvuloplasty    Trcath replace aortic valve      Us carotid doppler - diag img (cpt=93880)  07/10/2018    Less than 50% narrowing in bilateral internal carotid arteries      Family History   Problem Relation Age of Onset    Heart Disorder Brother     No Known Problems Brother         Social History     Socioeconomic History    Marital status:    Tobacco Use    Smoking status: Former     Current packs/day: 0.00     Average packs/day: 2.0 packs/day for 40.0 years (80.0 ttl pk-yrs)     Types: Cigarettes     Start date: 1955     Quit date: 1995     Years since quittin.2     Passive exposure: Never    Smokeless tobacco: Never   Vaping Use    Vaping status: Never Used   Substance and Sexual Activity    Alcohol use: No    Drug use: No   Other Topics Concern    Caffeine Concern No    Exercise No    Seat Belt Yes    Special Diet No    Stress Concern No    Weight Concern No     Social Drivers of Health     Financial Resource Strain: Not At Risk (2022)    Received from [facility] [place], [facility] [place]    Financial Resource Strain     How hard is it for you to pay for the very basics like food, housing, medical care, and heating?: Not hard at all    Food Insecurity: No Food Insecurity (1/4/2025)    Received from [facility]    Food Insecurity     Currently or in the past 3 months, have you worried your food would run out before you had money to buy more?: No     In the past 12 months, have you run out of food or been unable to get more?: No   Transportation Needs: No Transportation Needs (1/4/2025)    Received from [facility]    Transportation Needs     Currently or in the past 3 months, has lack of transportation kept you from medical appointments, getting food or medicine, or providing care to a family member?: Unrecognized value     Medical Transportation Needs?: No    Received from [facility], [facility]    Social Connections    Received from [facility], [facility]    Housing Stability      Specialists:Dr Watters, Dr Leyva- GI, Dr Barbosa/ Antione Johnson, Dr Self-cardiology, Dr Jerry-Cv surgery, Dr England- urology, Dr Hathaway- dermatology ( dreyer), [facility], Dr Chau- hematology,            no advanced directives  Occ: . : +. Children: 6  Exercise: minimal.  Diet: watches minimally     REVIEW OF SYSTEMS:   GENERAL: generally feels well, no fatigue, denies excessive daytime drowsiness, good appetite, wt stable over the past yr  SKIN: denies any unusual skin lesions or rashes, sees derm annually  EYES:denies blurred vision or double vision, glasses/ contacts: +, last [facility]- \"good\"  ENT: denies nasal congestion, PND or ST, denies snoring and reported periods of apnea, denies hearing deficits  LUNGS: denies shortness of breath with exertion, no coughing or wheezing, uses spiriva respimat once daily in am, rarely uses rescue inhaler, breathing much improved since aortic valve replacement  CARDIOVASCULAR: denies chest pain on exertion, palpations, anginal equivalent symptoms, no peripheral edema, pt  has implanted loop recorder, patient had a left atrial appendage closure device placed on 12/27/2022, pt occ checking bp 134/54   GI: denies abdominal pain,denies heartburn, constipation, diarrhea, or change in bowel habits  : denies dysuria, + hesitancy,nocturia x0- 1, + stable decreased urine stream intermittently, incontinence, patient states [facility]  MUSCULOSKELETAL: denies joint pain, +lower back discomfort off and on- takes tylenol  NEURO: denies headaches, tremors, dizziness, numbness and weakness  PSYCHE: denies depression or anxiety, denies any sleep difficulty,                   Have you often been bothered by feeling down, depressed of hopeless? no                  Have you often been bothered by little interest or pleasure in doing things? no  HEMATOLOGIC: denies unexplained bruising or bleeding, pt sees Dr Chau - Christi injection Q 2 weeks  ENDOCRINE: see hpi,\"cold' all the time, denies heat intolerance , no unexplained wt loss or gain  EXAM:   /48 (BP Location: Left arm, Patient Position: Sitting, Cuff Size: adult)   Pulse 60   Temp 97.7 °F (36.5 °C) (Temporal)   Resp 16   Ht 5' 9.69\" (1.77 m)   Wt 162 lb (73.5 kg)   SpO2 96%   BMI 23.46 kg/m²   Body mass index is 23.46 kg/m².   GENERAL: well developed, well nourished,in no apparent distress  SKIN: no rashes,no suspicious lesions, scattered seborrheic keratoses throughout the trunk, left lower leg with venous stasis pigmentary changes, bruising left flank, tattoo right deltoid, mild subareolar soft breast tissue palpable bilaterally, freely movable, no axillary or supraclavicular lymphadenopathy palpable  ENT: EAC:  Clear, TMs: intact, Nose: turbinates normal, septum: deviated to left, discharge: none, Pharynx: +upper denture plate and lower partial, no oral lesions  EYES:PERRLA, EOMI, normal optic disk  NECK: supple,no adenopathy,no bruits, no thyromegaly, protracted posture  LUNGS: distant bs,  clear to auscultation, no w/r/r  CARDIO: RRR with 2/6 systolic ejection murmur radiating to carotids bilaterally, no S3, S4, + decrease left femoral pulse, faint pedal pulses bilaterally,+ trace pitting lower extremity edema bilaterally, right >L femoral bruit , soft abdominal bruit  GI: +NBS's,no masses, HSM or tenderness  : Deferred to urology  RECTAL: Deferred to urology  BACK:  : FROM, No lateral curves  EXTREMITIES: no cyanosis, clubbing , FROM of all joints tested  NEURO: A &O X 3,cranial nerves are intact,motor and sensory are grossly intact, DTRs +2/4 UE/LE bilaterally no sensory loss in both hands, negative Tinel's sign at wrist and elbows  PSYCH: affect: flat, speech: clear and coherent, slightly delayed, Insight: appropriate  ASSESSMENT AND PLAN:   Harrison Gonzalez is a 89 year old male   Encounter Diagnoses   Name Primary?    Encounter for annual health examination Yes    Influenza A with pneumonia     Centrilobular emphysema (HCC)     Paroxysmal atrial fibrillation (HCC)     Coronary artery disease involving native coronary artery of native heart without angina pectoris     Essential hypertension     Chronic kidney disease (CKD) stage G3b/A1, moderately decreased glomerular filtration rate (GFR) between 30-44 mL/min/1.73 square meter and albuminuria creatinine ratio less than 30 mg/g (HCC)     Chronic systolic congestive heart failure (HCC)     S/P balloon aortic valvuloplasty- 3/22/23     S/P AVR     Anemia due to stage 3b chronic kidney disease (HCC)- hgb 10.0 on 1/7/25     Thrombocytopenia (HCC)- PLTS 100 K on 1/7/25     Hyperuricemia     Hypothyroidism (acquired)     Spinal stenosis of lumbar region without neurogenic claudication     Hypercholesteremia     Venous insufficiency of both lower extremities     PVD (peripheral vascular disease) (HCC)     Benign prostatic hyperplasia without lower urinary tract symptoms      Orders Placed This Encounter   Procedures    Lipid Panel [E]    TSH and Free  T4 [E]    Uric Acid [905][Q]     Meds & Refills for this Visit:  Requested Prescriptions      No prescriptions requested or ordered in this encounter     Imaging & Consults:  None  No follow-ups on file.  Patient Instructions   1. Encounter for annual health examination  I reviewed age-appropriate preventative health screening exams as well as advanced directives and immunizations.  Age-appropriate aspiratory guidance reviewed    2. Influenza A with pneumonia  I reviewed recent hospital records, imaging, labs, consultation notes.  Medication list and problem list were updated    3. Centrilobular emphysema (HCC)  Continue Spiriva, activity as tolerated, monitor clinically, discussed appropriate use of rescue inhaler    4. Paroxysmal atrial fibrillation (HCC)  Monitor clinically, continue anticoagulation and rate control    5. Coronary artery disease involving native coronary artery of native heart without angina pectoris  Follow-up with cardiology, monitor clinically, activity as tolerated    6. Essential hypertension  I reviewed goals for blood pressure as well as conservative management of hypertension including sodium restriction, daily aerobic activity, alcohol moderation, smoking cessation, and maintaining ideal body weight.  Continue current meds and monitoring    7. Chronic kidney disease (CKD) stage G3b/A1, moderately decreased glomerular filtration rate (GFR) between 30-44 mL/min/1.73 square meter and albuminuria creatinine ratio less than 30 mg/g (HCC)  Discussed importance of adequate daily fluid intake as well as avoidance of potential nephrotoxic drugs such as frequent NSAID use, check BMP every 6 months    8. Chronic systolic congestive heart failure (HCC)  Follow-up with cardiology, monitor clinically, activity as tolerated    9. S/P balloon aortic valvuloplasty- 3/22/23  Monitor clinically    10. S/P AVR  Monitor clinically    11. Anemia due to stage 3b chronic kidney disease (HCC)- hgb 10.0 on  1/7/25  Continue Aranesp injections every 2 weeks    12. Thrombocytopenia (HCC)- PLTS 100 K on 1/7/25  Monitor clinically, repeat CBC 3 months    13. Hyperuricemia  Continue allopurinol and adequate daily fluid intake, check labs annually     14. Hypothyroidism (acquired)  continue daily thyroid placement therapy, check labs annually    15. Spinal stenosis of lumbar region without neurogenic claudication  Reduced tolerated, monitor clinically    16. Hypercholesteremia  Continue statin therapy, check labs annually    17. Venous insufficiency of both lower extremities  Monitor clinically, elevate legs above heart level at rest, limit sodium intake    18. PVD (peripheral vascular disease) (HCC)  Activity as tolerated, monitor clinically    19. Benign prostatic hyperplasia without lower urinary tract symptoms  Continue tamsulosin, monitor clinically    Karthik Christopher DO, FAAFP           [1] No Known Allergies

## 2025-01-24 NOTE — PATIENT INSTRUCTIONS
1. Encounter for annual health examination  I reviewed age-appropriate preventative health screening exams as well as advanced directives and immunizations.  Age-appropriate aspiratory guidance reviewed    2. Influenza A with pneumonia  I reviewed recent hospital records, imaging, labs, consultation notes.  Medication list and problem list were updated    3. Centrilobular emphysema (HCC)  Continue Spiriva, activity as tolerated, monitor clinically, discussed appropriate use of rescue inhaler    4. Paroxysmal atrial fibrillation (HCC)  Monitor clinically, continue anticoagulation and rate control    5. Coronary artery disease involving native coronary artery of native heart without angina pectoris  Follow-up with cardiology, monitor clinically, activity as tolerated    6. Essential hypertension  I reviewed goals for blood pressure as well as conservative management of hypertension including sodium restriction, daily aerobic activity, alcohol moderation, smoking cessation, and maintaining ideal body weight.  Continue current meds and monitoring    7. Chronic kidney disease (CKD) stage G3b/A1, moderately decreased glomerular filtration rate (GFR) between 30-44 mL/min/1.73 square meter and albuminuria creatinine ratio less than 30 mg/g (HCC)  Discussed importance of adequate daily fluid intake as well as avoidance of potential nephrotoxic drugs such as frequent NSAID use, check BMP every 6 months    8. Chronic systolic congestive heart failure (HCC)  Follow-up with cardiology, monitor clinically, activity as tolerated    9. S/P balloon aortic valvuloplasty- 3/22/23  Monitor clinically    10. S/P AVR  Monitor clinically    11. Anemia due to stage 3b chronic kidney disease ([facility])- hgb 10.0 on 1/7/25  Continue Aranesp injections every 2 weeks    12. Thrombocytopenia ([facility])- PLTS 100 K on 1/7/25  Monitor clinically, repeat CBC 3 months    13. Hyperuricemia  Continue allopurinol and adequate daily fluid intake, check labs  annually     14. Hypothyroidism (acquired)  continue daily thyroid placement therapy, check labs annually    15. Spinal stenosis of lumbar region without neurogenic claudication  Reduced tolerated, monitor clinically    16. Hypercholesteremia  Continue statin therapy, check labs annually    17. Venous insufficiency of both lower extremities  Monitor clinically, elevate legs above heart level at rest, limit sodium intake    18. PVD (peripheral vascular disease) ([facility])  Activity as tolerated, monitor clinically    19. Benign prostatic hyperplasia without lower urinary tract symptoms  Continue tamsulosin, monitor clinically

## 2025-01-25 DIAGNOSIS — E78.00 HYPERCHOLESTEREMIA: ICD-10-CM

## 2025-01-25 DIAGNOSIS — E79.0 HYPERURICEMIA: Primary | ICD-10-CM

## 2025-01-25 DIAGNOSIS — E03.9 HYPOTHYROIDISM (ACQUIRED): ICD-10-CM

## 2025-03-05 ENCOUNTER — MED REC SCAN ONLY (OUTPATIENT)
Dept: FAMILY MEDICINE CLINIC | Facility: CLINIC | Age: OVER 89
End: 2025-03-05

## 2025-03-31 ENCOUNTER — OFFICE VISIT (OUTPATIENT)
Dept: FAMILY MEDICINE CLINIC | Facility: CLINIC | Age: OVER 89
End: 2025-03-31
Payer: MEDICARE

## 2025-03-31 VITALS
HEART RATE: 67 BPM | RESPIRATION RATE: 18 BRPM | OXYGEN SATURATION: 94 % | SYSTOLIC BLOOD PRESSURE: 122 MMHG | WEIGHT: 171 LBS | TEMPERATURE: 98 F | BODY MASS INDEX: 25 KG/M2 | DIASTOLIC BLOOD PRESSURE: 44 MMHG

## 2025-03-31 DIAGNOSIS — R33.8 URINARY RETENTION DUE TO BENIGN PROSTATIC HYPERPLASIA: ICD-10-CM

## 2025-03-31 DIAGNOSIS — N40.1 URINARY RETENTION DUE TO BENIGN PROSTATIC HYPERPLASIA: ICD-10-CM

## 2025-03-31 DIAGNOSIS — I50.43 ACUTE ON CHRONIC COMBINED SYSTOLIC AND DIASTOLIC HEART FAILURE (HCC): ICD-10-CM

## 2025-03-31 DIAGNOSIS — G31.84 MCI (MILD COGNITIVE IMPAIRMENT): ICD-10-CM

## 2025-03-31 DIAGNOSIS — G25.81 RESTLESS LEG SYNDROME: ICD-10-CM

## 2025-03-31 DIAGNOSIS — J18.9 PNEUMONIA OF BOTH LOWER LOBES DUE TO INFECTIOUS ORGANISM: Primary | ICD-10-CM

## 2025-03-31 DIAGNOSIS — N18.32 CHRONIC KIDNEY DISEASE (CKD) STAGE G3B/A1, MODERATELY DECREASED GLOMERULAR FILTRATION RATE (GFR) BETWEEN 30-44 ML/MIN/1.73 SQUARE METER AND ALBUMINURIA CREATININE RATIO LESS THAN 30 MG/G (HCC): ICD-10-CM

## 2025-03-31 DIAGNOSIS — J43.2 CENTRILOBULAR EMPHYSEMA (HCC): ICD-10-CM

## 2025-03-31 DIAGNOSIS — E03.9 HYPOTHYROIDISM (ACQUIRED): ICD-10-CM

## 2025-03-31 DIAGNOSIS — D63.1 ANEMIA DUE TO STAGE 3B CHRONIC KIDNEY DISEASE (HCC): ICD-10-CM

## 2025-03-31 DIAGNOSIS — I10 ESSENTIAL HYPERTENSION: ICD-10-CM

## 2025-03-31 DIAGNOSIS — N18.32 ANEMIA DUE TO STAGE 3B CHRONIC KIDNEY DISEASE (HCC): ICD-10-CM

## 2025-03-31 PROCEDURE — 99214 OFFICE O/P EST MOD 30 MIN: CPT | Performed by: FAMILY MEDICINE

## 2025-03-31 RX ORDER — ROPINIROLE 0.25 MG/1
TABLET, FILM COATED ORAL
Qty: 33 TABLET | Refills: 1 | Status: SHIPPED | OUTPATIENT
Start: 2025-03-31 | End: 2025-05-03

## 2025-03-31 RX ORDER — ALBUTEROL SULFATE 90 UG/1
2 INHALANT RESPIRATORY (INHALATION) EVERY 4 HOURS PRN
Qty: 1 EACH | Refills: 0 | Status: SHIPPED | OUTPATIENT
Start: 2025-03-31

## 2025-03-31 RX ORDER — CEPHALEXIN 500 MG/1
500 CAPSULE ORAL
COMMUNITY

## 2025-03-31 RX ORDER — GUAIFENESIN 600 MG/1
600 TABLET, EXTENDED RELEASE ORAL
COMMUNITY
Start: 2025-03-29

## 2025-03-31 NOTE — PATIENT INSTRUCTIONS
I reviewed hospital records including consultation notes, imaging, labs, discharge medications  I discussed alternative inhaled medication options.  Since patient is seeing pulmonary tomorrow, I will defer recommendations with the exception of providing an albuterol rescue inhaler, continue with Spiriva  I discussed restless leg syndrome, possible parkinsonism symptoms.  I discussed treatment options including risk, benefits, side effects, cost etc. of medication.  Will start ropinirole 0.25 mg nightly for 3 days then increase to 0.5 mg nightly and report response to medication over the next 1 to 2 weeks, may continue to titrate dose as needed for lowest effective dose  DMV form for handicap placard completed  Follow-up with urology as scheduled, maintain catheter for now

## 2025-03-31 NOTE — PROGRESS NOTES
Harrison Gonzalez is a 89 year old male.  Chief Complaint   Patient presents with    Hospital F/U     Here w/ wife  HPI:   Patient was admitted to [facility] from 3/26 through 3/29 with complaints of increasing shortness of breath, anorexia, and fatigue.  Chest x-ray on admission showed bilateral lower lobe opacities.  Patient was started on antibiotics.  proBNP was elevated.  He was evaluated by cardiology, no additional ischemic workup necessary.  Patient felt to have some demand ischemia.  Echocardiogram showed ejection fraction of 40 to 45% with dilated left ventricle and elevated pulmonary artery pressures.  Patient was seen in the emergency room later on the day of discharge after having his Camargo catheter removed.  He was unable to void since discharge.  Bladder scan showed 500 mL of urine retained.  Catheter was reinstituted.  Patient was discharged on cephalexin, just finished today  Will see Dr England 4/10/25  Sees pulmonary tomorrow morning    Wife also has concerns regarding a handicap placard  Wife also notes that patient's cognitive function tends to wax and wane with several good weeks followed by a decline in recall for a week or so.  Much worse during recent illness  She reports that he is constantly restless when sleeping, he sleeps in a recliner and constantly moving his arms and leg, occasionally talking in his sleep  Current Outpatient Medications   Medication Sig Dispense Refill    empagliflozin 25 MG Oral Tab Take 0.5 tablets (12.5 mg total) by mouth.      tamsulosin 0.4 MG Oral Cap Take 1 capsule (0.4 mg total) by mouth daily.      hydrALAZINE 50 MG Oral Tab Take 1 tablet (50 mg total) by mouth 3 (three) times daily.      isosorbide dinitrate 10 MG Oral Tab Take 1 tablet (10 mg total) by mouth 3 (three) times daily.      carvedilol 6.25 MG Oral Tab Take 1 tablet (6.25 mg total) by mouth 2 (two) times daily with meals.      allopurinol 300 MG Oral Tab Take 1 tablet (300 mg total) by mouth  daily. 90 tablet 3    bumetanide 1 MG Oral Tab Take 1 tablet (1 mg total) by mouth 2 (two) times daily. 180 tablet 0    atorvastatin 40 MG Oral Tab Take 1 tablet (40 mg total) by mouth nightly.      amLODIPine 10 MG Oral Tab Take 1 tablet (10 mg total) by mouth in the morning and 1 tablet (10 mg total) before bedtime.      Tiotropium Bromide Monohydrate (SPIRIVA RESPIMAT) 2.5 MCG/ACT Inhalation Aero Soln Inhale 2 puffs into the lungs daily.  0    Darbepoetin Lenny 100 MCG/0.5ML Injection Solution Prefilled Syringe 0.5 mL (100 mcg total) every 14 (fourteen) days.      nitroGLYCERIN 0.4 MG Sublingual SL Tab Place 1 tablet (0.4 mg total) under the tongue every 5 (five) minutes as needed for Chest pain. 50 tablet 0    Ascorbic Acid (VITAMIN C) 100 MG Oral Tab Take 1 tablet (100 mg total) by mouth daily.      Levothyroxine Sodium (SYNTHROID, LEVOTHROID) 125 MCG Oral Tab Take 1 tablet (125 mcg total) by mouth before breakfast.      Cranberry 125 MG Oral Tab Take 1 tablet by mouth daily.      brimonidine Tartrate (ALPHAGAN) 0.2 % Ophthalmic Solution Place 1 drop into both eyes 3 (three) times daily.      Dorzolamide HCl (TRUSOPT) 2 % Ophthalmic Solution Place 1 drop into both eyes 3 (three) times daily.      latanoprost (XALATAN) 0.005 % Ophthalmic Solution Place 1 drop into both eyes 3 (three) times daily.      ASPIRIN 81 MG OR TABS 1 TABLET DAILY-pt to check with Dr. Zuñiga regarding taking/holding prior to procedure      cephALEXin 500 MG Oral Cap Take 1 capsule (500 mg total) by mouth. (Patient not taking: Reported on 3/31/2025)      guaiFENesin  MG Oral Tablet 12 Hr Take 1 tablet (600 mg total) by mouth. (Patient not taking: Reported on 3/31/2025)      Albuterol Sulfate  (90 Base) MCG/ACT Inhalation Aero Soln Inhale 2 puffs into the lungs every 4 (four) hours as needed for Wheezing or Shortness of Breath. (Patient not taking: Reported on 3/31/2025) 1 Inhaler 0    acetaminophen 500 MG Oral Tab Take 2 tablets  (1,000 mg total) by mouth one time.        Past Medical History:    Back problem    stenosis    BPH (benign prostatic hypertrophy)    CAD (coronary artery disease)    Hx of CABG    COPD (chronic obstructive pulmonary disease) (HCC)    not on continuous oxygen    Glaucoma    High blood pressure    High cholesterol    Hypothyroidism (acquired)    Neuropathy    right toe-     Osteoarthritis    Postoperative retention of urine    PVD (peripheral vascular disease)      Social History:  Social History     Socioeconomic History    Marital status:    Tobacco Use    Smoking status: Former     Current packs/day: 0.00     Average packs/day: 2.0 packs/day for 40.0 years (80.0 ttl pk-yrs)     Types: Cigarettes     Start date: 1955     Quit date: 1995     Years since quittin.4     Passive exposure: Never    Smokeless tobacco: Never   Vaping Use    Vaping status: Never Used   Substance and Sexual Activity    Alcohol use: No    Drug use: No   Other Topics Concern    Caffeine Concern No    Exercise No    Seat Belt Yes    Special Diet No    Stress Concern No    Weight Concern No     Social Drivers of Health     Food Insecurity: No Food Insecurity (3/26/2025)    Received from [facility]    Food Insecurity     Currently or in the past 3 months, have you worried your food would run out before you had money to buy more?: No     In the past 12 months, have you run out of food or been unable to get more?: No   Transportation Needs: No Transportation Needs (3/26/2025)    Received from [facility]    Transportation Needs     Currently or in the past 3 months, has lack of transportation kept you from medical appointments, getting food or medicine, or providing care to a family member?: Unrecognized value     Medical Transportation Needs?: No    Received from [facility], [facility]    Housing Stability        REVIEW OF SYSTEMS:   GENERAL  HEALTH: feels well otherwise, denies fatigue, appetite ok, patient states he feels \"pretty good\" since coming home from the hospital  SKIN: denies any unusual skin lesions or rashes  ENT: denies nasal congestion, pnd, sore throat, ear pain or pressure, decreased hearing  RESPIRATORY: slight increased shortness of breath with exertion,occ cough,denies wheezing, patient states he can only walk about 150 feet before he needs to stop due to breathing, PFTs earlier this year with moderate obstruction and mild restrictive pattern  CARDIOVASCULAR: denies chest pain on exertion, +stable edema  GI: denies abdominal pain and denies heartburn  NEURO: denies headaches. See hpi  PSYCH: denies feeling stressed or anxious    EXAM:   /44 (BP Location: Left arm, Patient Position: Sitting, Cuff Size: adult)   Pulse 67   Temp 97.7 °F (36.5 °C) (Temporal)   Resp 18   Wt 171 lb (77.6 kg)   SpO2 94%   BMI 24.76 kg/m²   GENERAL: well developed, well nourished,in no apparent distress, well hydrated  SKIN: no rashes,no suspicious lesions  ENT: TMs: intact, good mobility, Nose: turbinates clear, no dc, Throat: no erythema, pnd, or lesions  NECK: supple,no adenopathy,no bruits, no thyromegaly  LUNGS: Distant breath sounds with, coarse breath sounds with forced cough only, no use of accessory muscles or other signs of respiratory distress  CARDIO: RRR without murmur, peripheral pulses intact  GI: good BS's,no masses, HSM or tenderness  EXTREMITIES: FROM of all joints  Neuro: Patient observed constantly fidgeting, swinging his legs, moving his hands, mild shuffling gait, DTR symmetrical  ASSESSMENT AND PLAN:     Encounter Diagnoses   Name Primary?    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    Meds & Refills for this Visit:  Requested Prescriptions     Signed Prescriptions Disp Refills    albuterol 108 (90 Base) MCG/ACT Inhalation Aero Soln 1 each 0     Sig: Inhale 2 puffs into the lungs every 4 (four) hours as needed for Wheezing.    rOPINIRole 0.25 MG Oral Tab 33 tablet 1     Sig: Take 1 tablet (0.25 mg total) by mouth 3 (three) times daily for 3 days, THEN 2 tablets (0.5 mg total) 3 (three) times daily.     Imaging & Consults:  None  No follow-ups on file.  Patient Instructions   I reviewed hospital records including consultation notes, imaging, labs, discharge medications  I discussed alternative inhaled medication options.  Since patient is seeing pulmonary tomorrow, I will defer recommendations with the exception of providing an albuterol rescue inhaler, continue with Spiriva  I discussed restless leg syndrome, possible parkinsonism symptoms.  I discussed treatment options including risk, benefits, side effects, cost etc. of medication.  Will start ropinirole 0.25 mg nightly for 3 days then increase to 0.5 mg nightly and report response to medication over the next 1 to 2 weeks, may continue to titrate dose as needed for lowest effective dose  DMV form for handicap placard completed  Follow-up with urology as scheduled, maintain catheter for now   The patient indicates understanding of these issues and agrees to the plan.    Karthik Christopher DO, FAAFP

## 2025-04-02 NOTE — TELEPHONE ENCOUNTER
[facility] calling to let  know patient passed away and wanting to know if he would sign death certificate.

## 2025-04-03 NOTE — TELEPHONE ENCOUNTER
Phone call made to the wife of Mr. Gonzalez on  to express my condolences on the loss of her .  He had been seen by both of his specialist the previous day.  His hematologist noted marked shortness of breath while walking in the office with an O2 saturation decreasing to 84%.  He was placed on oxygen and arrangements were made for home oxygen to be delivered the following day.  He was also seen by his pulmonologist for hospital follow-up and noted to be short of breath.  Patient returned home.  Patient's wife states that it sounded like he was \"filling up with fluid\".  She gave him to Bum[employer] thinking this would help.  He had a Camargo catheter in place for urinary obstruction.  He was to see his urologist for a cystoscopy in the near future.  His wife's stated that she found him on the floor near his chair the morning of .  He was already cold however paramedics were called and CPR was reportedly performed for approximately 30 minutes.  Patient was pronounced dead and taken to the  home.  His death certificate has been forwarded to me for completion    Karthik Christopher,

## (undated) DEVICE — SUTURE ETHIBOND EXCEL 2-0 SH

## (undated) DEVICE — SUTURE VICRYL 3-0 SH

## (undated) DEVICE — BREAST-HERNIA-PORT CDS-LF: Brand: MEDLINE INDUSTRIES, INC.

## (undated) DEVICE — GAMMEX® NON-LATEX PI TEXTURED SIZE 7.5, STERILE POLYISOPRENE POWDER-FREE SURGICAL GLOVE: Brand: GAMMEX

## (undated) DEVICE — SUTURE MONOCRYL 4-0 PS-2

## (undated) DEVICE — SOL  .9 1000ML BTL

## (undated) DEVICE — KENDALL SCD EXPRESS SLEEVES, KNEE LENGTH, MEDIUM: Brand: KENDALL SCD

## (undated) NOTE — LETTER
Carolina Broderick D.O.     Surgical Clearance Needed    Date: 12/3/2019                                                                       From: Karma Winslow     Attn: Dr. Duane Galindo

## (undated) NOTE — Clinical Note
Oliverio Wolf saw Abel Shenbruno in the office for an umbilical hernia. We are planning on repair. He currently is on Xarelto. Can you please assess whether it is reasonable to stop this prior to surgery?   Thank you Marii Tadeo

## (undated) NOTE — MR AVS SNAPSHOT
[facility]  [address] [address]  [phone]               Thank you for choosing us for your health care visit with Joan Gurrola. Peewee Wick DO.   We are glad to serve you and happy to provide you with this sum Place 1 drop into both eyes 3 (three) times daily. Commonly known as:  ALPHAGAN           carvedilol 25 MG Tabs   Take 25 mg by mouth 2 (two) times daily with meals.    Commonly known as:  COREG           Cranberry 125 MG Tabs   Take 1 tablet by mouth azra DASH eating plan Adopt a diet rich in fruits, vegetables, and low fat dairy products with reduced content of saturated and total fat.    Dietary sodium reduction Reduce dietary sodium intake to <= 100 mmol per day (2.4 g sodium or 6 g sodium chloride)   Aer You don’t need to join a gym. Home exercises work great.  Put more priority on exercise in your life                    Visit [facility] online at  [email or web address]

## (undated) NOTE — LETTER
08/24/20      Claudette Harp  [address] [id number]      Our records indicate that you are due for Orders Placed This Encounter      Comp Metabolic Panel (14) [E]          Order Comments: To be done @ [facility].   PLEASE FAX RESULTS T

## (undated) NOTE — LETTER
[facility] [address], [address]  Consent for Procedure/Sedation    Date:     Time:       1.  I authorize the performance upon Joel Perla the following: Peripheral angiography, atherectomy, percutaneous transluminal angiopl you may develop a skin reaction.       Signature of Patient: _______________________________________________________    Signature of person authorized                                           Relationship to  to consent for patient: _______________________

## (undated) NOTE — MR AVS SNAPSHOT
[facility]  [address] [address]  [phone]               Thank you for choosing us for your health care visit with Nena Buenrostro. Kym Dukes DO.   We are glad to serve you and happy to provide you with this sum Place 1 drop into both eyes 3 (three) times daily. Commonly known as:  ALPHAGAN           carvedilol 25 MG Tabs   Take 25 mg by mouth 2 (two) times daily with meals.    Commonly known as:  COREG           Cranberry 125 MG Tabs   Take 1 tablet by mouth azra Your blood pressure indicates you may be at-risk for Hypertension. Please consider the following Lifestyle Modifications. Also, please return for a follow-up Blood Pressure Check in 1 month.      Lifestyle Modification Recommendations:    Modification R